# Patient Record
Sex: MALE | Race: WHITE | Employment: PART TIME | URBAN - METROPOLITAN AREA
[De-identification: names, ages, dates, MRNs, and addresses within clinical notes are randomized per-mention and may not be internally consistent; named-entity substitution may affect disease eponyms.]

---

## 2020-12-08 ENCOUNTER — HOSPITAL ENCOUNTER (INPATIENT)
Age: 35
LOS: 13 days | Discharge: OTHER INSTITUTION | DRG: 885 | End: 2020-12-21
Attending: EMERGENCY MEDICINE | Admitting: PSYCHIATRY & NEUROLOGY
Payer: COMMERCIAL

## 2020-12-08 PROBLEM — F23 ACUTE PSYCHOSIS (HCC): Status: ACTIVE | Noted: 2020-12-08

## 2020-12-08 LAB
ACETAMINOPHEN LEVEL: <5 MCG/ML (ref 10–30)
ALBUMIN SERPL-MCNC: 4.1 G/DL (ref 3.5–5.2)
ALP BLD-CCNC: 76 U/L (ref 40–129)
ALT SERPL-CCNC: 11 U/L (ref 0–40)
AMPHETAMINE SCREEN, URINE: NOT DETECTED
ANION GAP SERPL CALCULATED.3IONS-SCNC: 9 MMOL/L (ref 7–16)
AST SERPL-CCNC: 17 U/L (ref 0–39)
BARBITURATE SCREEN URINE: NOT DETECTED
BASOPHILS ABSOLUTE: 0.06 E9/L (ref 0–0.2)
BASOPHILS RELATIVE PERCENT: 0.9 % (ref 0–2)
BENZODIAZEPINE SCREEN, URINE: NOT DETECTED
BILIRUB SERPL-MCNC: 0.2 MG/DL (ref 0–1.2)
BILIRUBIN URINE: NEGATIVE
BLOOD, URINE: NEGATIVE
BUN BLDV-MCNC: 14 MG/DL (ref 6–20)
CALCIUM SERPL-MCNC: 9 MG/DL (ref 8.6–10.2)
CANNABINOID SCREEN URINE: NOT DETECTED
CHLORIDE BLD-SCNC: 102 MMOL/L (ref 98–107)
CLARITY: CLEAR
CO2: 29 MMOL/L (ref 22–29)
COCAINE METABOLITE SCREEN URINE: NOT DETECTED
COLOR: YELLOW
CREAT SERPL-MCNC: 1.1 MG/DL (ref 0.7–1.2)
EOSINOPHILS ABSOLUTE: 0.28 E9/L (ref 0.05–0.5)
EOSINOPHILS RELATIVE PERCENT: 4 % (ref 0–6)
ETHANOL: <10 MG/DL (ref 0–0.08)
FENTANYL SCREEN, URINE: NOT DETECTED
GFR AFRICAN AMERICAN: >60
GFR NON-AFRICAN AMERICAN: >60 ML/MIN/1.73
GLUCOSE BLD-MCNC: 140 MG/DL (ref 74–99)
GLUCOSE URINE: NEGATIVE MG/DL
HCT VFR BLD CALC: 41.8 % (ref 37–54)
HEMOGLOBIN: 14.3 G/DL (ref 12.5–16.5)
IMMATURE GRANULOCYTES #: 0.1 E9/L
IMMATURE GRANULOCYTES %: 1.4 % (ref 0–5)
KETONES, URINE: NEGATIVE MG/DL
LEUKOCYTE ESTERASE, URINE: NEGATIVE
LYMPHOCYTES ABSOLUTE: 1.59 E9/L (ref 1.5–4)
LYMPHOCYTES RELATIVE PERCENT: 22.8 % (ref 20–42)
Lab: NORMAL
MCH RBC QN AUTO: 31.8 PG (ref 26–35)
MCHC RBC AUTO-ENTMCNC: 34.2 % (ref 32–34.5)
MCV RBC AUTO: 92.9 FL (ref 80–99.9)
METHADONE SCREEN, URINE: NOT DETECTED
MONOCYTES ABSOLUTE: 0.62 E9/L (ref 0.1–0.95)
MONOCYTES RELATIVE PERCENT: 8.9 % (ref 2–12)
NEUTROPHILS ABSOLUTE: 4.32 E9/L (ref 1.8–7.3)
NEUTROPHILS RELATIVE PERCENT: 62 % (ref 43–80)
NITRITE, URINE: NEGATIVE
OPIATE SCREEN URINE: NOT DETECTED
OXYCODONE URINE: NOT DETECTED
PDW BLD-RTO: 12.2 FL (ref 11.5–15)
PH UA: 6.5 (ref 5–9)
PHENCYCLIDINE SCREEN URINE: NOT DETECTED
PLATELET # BLD: 193 E9/L (ref 130–450)
PMV BLD AUTO: 10 FL (ref 7–12)
POTASSIUM REFLEX MAGNESIUM: 4.2 MMOL/L (ref 3.5–5)
PROTEIN UA: NEGATIVE MG/DL
RBC # BLD: 4.5 E12/L (ref 3.8–5.8)
SALICYLATE, SERUM: <0.3 MG/DL (ref 0–30)
SARS-COV-2, NAAT: NOT DETECTED
SODIUM BLD-SCNC: 140 MMOL/L (ref 132–146)
SPECIFIC GRAVITY UA: 1.02 (ref 1–1.03)
TOTAL PROTEIN: 6.7 G/DL (ref 6.4–8.3)
TRICYCLIC ANTIDEPRESSANTS SCREEN SERUM: NEGATIVE NG/ML
UROBILINOGEN, URINE: 0.2 E.U./DL
VALPROIC ACID LEVEL: 47 MCG/ML (ref 50–100)
WBC # BLD: 7 E9/L (ref 4.5–11.5)

## 2020-12-08 PROCEDURE — 1240000000 HC EMOTIONAL WELLNESS R&B

## 2020-12-08 PROCEDURE — 6370000000 HC RX 637 (ALT 250 FOR IP): Performed by: PSYCHIATRY & NEUROLOGY

## 2020-12-08 PROCEDURE — 81003 URINALYSIS AUTO W/O SCOPE: CPT

## 2020-12-08 PROCEDURE — 93005 ELECTROCARDIOGRAM TRACING: CPT | Performed by: PHYSICIAN ASSISTANT

## 2020-12-08 PROCEDURE — U0002 COVID-19 LAB TEST NON-CDC: HCPCS

## 2020-12-08 PROCEDURE — 80307 DRUG TEST PRSMV CHEM ANLYZR: CPT

## 2020-12-08 PROCEDURE — 36415 COLL VENOUS BLD VENIPUNCTURE: CPT

## 2020-12-08 PROCEDURE — 80164 ASSAY DIPROPYLACETIC ACD TOT: CPT

## 2020-12-08 PROCEDURE — 85025 COMPLETE CBC W/AUTO DIFF WBC: CPT

## 2020-12-08 PROCEDURE — 99285 EMERGENCY DEPT VISIT HI MDM: CPT

## 2020-12-08 PROCEDURE — 80053 COMPREHEN METABOLIC PANEL: CPT

## 2020-12-08 PROCEDURE — G0480 DRUG TEST DEF 1-7 CLASSES: HCPCS

## 2020-12-08 RX ORDER — DIVALPROEX SODIUM 500 MG/1
1000 TABLET, DELAYED RELEASE ORAL NIGHTLY
Status: ON HOLD | COMMUNITY
End: 2020-12-21 | Stop reason: HOSPADM

## 2020-12-08 RX ORDER — HYDROXYZINE PAMOATE 50 MG/1
50 CAPSULE ORAL 3 TIMES DAILY PRN
Status: DISCONTINUED | OUTPATIENT
Start: 2020-12-08 | End: 2020-12-21 | Stop reason: HOSPADM

## 2020-12-08 RX ORDER — HYDROXYZINE HYDROCHLORIDE 10 MG/1
50 TABLET, FILM COATED ORAL 3 TIMES DAILY PRN
Status: DISCONTINUED | OUTPATIENT
Start: 2020-12-08 | End: 2020-12-08

## 2020-12-08 RX ORDER — TRAZODONE HYDROCHLORIDE 50 MG/1
50 TABLET ORAL NIGHTLY PRN
Status: DISCONTINUED | OUTPATIENT
Start: 2020-12-08 | End: 2020-12-21 | Stop reason: HOSPADM

## 2020-12-08 RX ORDER — HALOPERIDOL 5 MG/ML
5 INJECTION INTRAMUSCULAR EVERY 6 HOURS PRN
Status: DISCONTINUED | OUTPATIENT
Start: 2020-12-08 | End: 2020-12-21 | Stop reason: HOSPADM

## 2020-12-08 RX ORDER — ACETAMINOPHEN 325 MG/1
650 TABLET ORAL EVERY 6 HOURS PRN
Status: DISCONTINUED | OUTPATIENT
Start: 2020-12-08 | End: 2020-12-21 | Stop reason: HOSPADM

## 2020-12-08 RX ORDER — HALOPERIDOL 5 MG
5 TABLET ORAL EVERY 6 HOURS PRN
Status: DISCONTINUED | OUTPATIENT
Start: 2020-12-08 | End: 2020-12-21 | Stop reason: HOSPADM

## 2020-12-08 RX ORDER — ARIPIPRAZOLE 10 MG/1
10 TABLET ORAL NIGHTLY
Status: ON HOLD | COMMUNITY
End: 2020-12-21 | Stop reason: HOSPADM

## 2020-12-08 RX ORDER — MAGNESIUM HYDROXIDE/ALUMINUM HYDROXICE/SIMETHICONE 120; 1200; 1200 MG/30ML; MG/30ML; MG/30ML
30 SUSPENSION ORAL PRN
Status: DISCONTINUED | OUTPATIENT
Start: 2020-12-08 | End: 2020-12-15

## 2020-12-08 RX ADMIN — HYDROXYZINE PAMOATE 50 MG: 50 CAPSULE ORAL at 22:07

## 2020-12-08 RX ADMIN — HALOPERIDOL 5 MG: 5 TABLET ORAL at 22:07

## 2020-12-08 ASSESSMENT — SLEEP AND FATIGUE QUESTIONNAIRES
AVERAGE NUMBER OF SLEEP HOURS: 8
DO YOU USE A SLEEP AID: NO
DO YOU HAVE DIFFICULTY SLEEPING: NO

## 2020-12-08 ASSESSMENT — PATIENT HEALTH QUESTIONNAIRE - PHQ9: SUM OF ALL RESPONSES TO PHQ QUESTIONS 1-9: 13

## 2020-12-08 ASSESSMENT — LIFESTYLE VARIABLES: HISTORY_ALCOHOL_USE: YES
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
## 2020-12-08 NOTE — ED PROVIDER NOTES
ED Attending  CC: No     Department of Emergency Medicine   ED  Provider Note  Admit Date/RoomTime: 12/8/2020  1:57 PM  ED Room: [address] [place] [address]     Chief Complaint   Suicidal (suicidal with plan to use carbon monaxide from car. Paranoid, fears people are chasing him)    History of Present Illness      Sofía Arnett is a 38 y.o. old male who presents to the ED for suicidal ideation. Patient states he feels like people are after him and chasing him. He states he has felt suicidal for the past several months. He reports plan to use carbon monoxide from his car exhaust. He states he was recently hospitalized in [place] for 20 days for same reason. Patient denies homicidal ideation or visual hallucinations. He states he occasionally hears \"sounds\" but is unsure if they are just creaking from the house. He denies hearing voices. Patient has no chest pain, SOB, abdominal pain, nausea, vomiting, diarrhea, fever/chills, cough, congestion, rash, urinary complaints, or recent drug use but does admit to alcohol use. Patient is calm and cooperative. He is alert and oriented x3 and in no apparent distress at this exam.     Based on the behaviors expressed//manifested by this patient, he is an active danger to his self and or others and wound benefit from psychiatric evaluation/psychiartic hold at this time as he is unsafe and at significant risk to harm self or others. Pink slipped by ED physician     ROS   Pertinent positives and negatives are stated within HPI, all other systems reviewed and are negative. Past Medical History:  has no past medical history on file. Past Surgical History:  has no past surgical history on file. Social History:      Family History: family history is not on file. The patients home medications have been reviewed. Allergies: Patient has no known allergies. Allergies have been reviewed with patient.      Physical Exam   VS:  /74   Pulse 95   Temp 98.8 °F (37.1 °C) (Oral)   Resp 0.0 - 6.0 %    Basophils % 0.9 0.0 - 2.0 %    Neutrophils Absolute 4.32 1.80 - 7.30 E9/L    Immature Granulocytes # 0.10 E9/L    Lymphocytes Absolute 1.59 1.50 - 4.00 E9/L    Monocytes Absolute 0.62 0.10 - 0.95 E9/L    Eosinophils Absolute 0.28 0.05 - 0.50 E9/L    Basophils Absolute 0.06 0.00 - 0.20 E9/L   Comprehensive Metabolic Panel w/ Reflex to MG   Result Value Ref Range    Sodium 140 132 - 146 mmol/L    Potassium reflex Magnesium 4.2 3.5 - 5.0 mmol/L    Chloride 102 98 - 107 mmol/L    CO2 29 22 - 29 mmol/L    Anion Gap 9 7 - 16 mmol/L    Glucose 140 (H) 74 - 99 mg/dL    BUN 14 6 - 20 mg/dL    CREATININE 1.1 0.7 - 1.2 mg/dL    GFR Non-African American >60 >=60 mL/min/1.73    GFR African American >60     Calcium 9.0 8.6 - 10.2 mg/dL    Total Protein 6.7 6.4 - 8.3 g/dL    Alb 4.1 3.5 - 5.2 g/dL    Total Bilirubin 0.2 0.0 - 1.2 mg/dL    Alkaline Phosphatase 76 40 - 129 U/L    ALT 11 0 - 40 U/L    AST 17 0 - 39 U/L   Urinalysis   Result Value Ref Range    Color, UA Yellow Straw/Yellow    Clarity, UA Clear Clear    Glucose, Ur Negative Negative mg/dL    Bilirubin Urine Negative Negative    Ketones, Urine Negative Negative mg/dL    Specific Gravity, UA 1.020 1.005 - 1.030    Blood, Urine Negative Negative    pH, UA 6.5 5.0 - 9.0    Protein, UA Negative Negative mg/dL    Urobilinogen, Urine 0.2 <2.0 E.U./dL    Nitrite, Urine Negative Negative    Leukocyte Esterase, Urine Negative Negative   URINE DRUG SCREEN   Result Value Ref Range    Amphetamine Screen, Urine NOT DETECTED Negative <1000 ng/mL    Barbiturate Screen, Ur NOT DETECTED Negative < 200 ng/mL    Benzodiazepine Screen, Urine NOT DETECTED Negative < 200 ng/mL    Cannabinoid Scrn, Ur NOT DETECTED Negative < 50ng/mL    Cocaine Metabolite Screen, Urine NOT DETECTED Negative < 300 ng/mL    Opiate Scrn, Ur NOT DETECTED Negative < 300ng/mL    PCP Screen, Urine NOT DETECTED Negative < 25 ng/mL    Methadone Screen, Urine NOT DETECTED Negative <300 ng/mL    Oxycodone Urine NOT DETECTED Negative <100 ng/mL    FENTANYL SCREEN, URINE NOT DETECTED Negative <1 ng/mL    Drug Screen Comment: see below    Serum Drug Screen   Result Value Ref Range    Ethanol Lvl <10 mg/dL    Acetaminophen Level <5.0 (L) 10.0 - 61.0 mcg/mL    Salicylate, Serum <5.7 0.0 - 30.0 mg/dL    TCA Scrn NEGATIVE Cutoff:300 ng/mL   COVID-19   Result Value Ref Range    SARS-CoV-2, NAAT Not Detected Not Detected     Imaging: All Radiology results interpreted by Radiologist unless otherwise noted. No orders to display     EKG #1:  Interpreted by emergency department physician unless otherwise noted. Time:  1511    Rate: 82  QT/QTc: 362/422  Rhythm: Sinus. Interpretation: no acute changes. Comparison: no previous EKG. ED Course / Medical Decision Making   Medications - No data to display    Re-examination:   Patient continues to rest comfortably in bed with no distress. He remains clam and cooperative      Consult(s):  IP CONSULT TO SOCIAL WORK    Procedure(s):  None    MDM:       Counseling: The emergency provider has spoken with the patient/caregiver and/or gaurdian and discussed todays results, in addition to providing specific details for the plan of care and counseling regarding the diagnosis and prognosis. Questions are answered at this time and they are agreeable with the plan. Case discussed with Dr. Maribell Hurtado who saw patient with me     Assessment      1. Suicidal ideation      Plan   Admit to mental health unit - medically cleared for admission  Patient condition is good    New Medications     New Prescriptions    No medications on file       Electronically signed by Ry Ramsay PA-C   DD: 12/8/20  **This report was transcribed using voice recognition software. Every effort was made to ensure accuracy; however, inadvertent computerized transcription errors may be present.     END OF ED PROVIDER NOTE       Ry Ramsay PA-C  12/08/20 6857

## 2020-12-09 LAB
CHOLESTEROL, TOTAL: 183 MG/DL (ref 0–199)
EKG ATRIAL RATE: 82 BPM
EKG P AXIS: 73 DEGREES
EKG P-R INTERVAL: 176 MS
EKG Q-T INTERVAL: 362 MS
EKG QRS DURATION: 88 MS
EKG QTC CALCULATION (BAZETT): 422 MS
EKG R AXIS: 81 DEGREES
EKG T AXIS: 61 DEGREES
EKG VENTRICULAR RATE: 82 BPM
HBA1C MFR BLD: 5.5 % (ref 4–5.6)
HDLC SERPL-MCNC: 71 MG/DL
LDL CHOLESTEROL CALCULATED: 98 MG/DL (ref 0–99)
TRIGL SERPL-MCNC: 69 MG/DL (ref 0–149)
VLDLC SERPL CALC-MCNC: 14 MG/DL

## 2020-12-09 PROCEDURE — 80061 LIPID PANEL: CPT

## 2020-12-09 PROCEDURE — 36415 COLL VENOUS BLD VENIPUNCTURE: CPT

## 2020-12-09 PROCEDURE — 83036 HEMOGLOBIN GLYCOSYLATED A1C: CPT

## 2020-12-09 PROCEDURE — 99223 1ST HOSP IP/OBS HIGH 75: CPT | Performed by: NURSE PRACTITIONER

## 2020-12-09 PROCEDURE — 93010 ELECTROCARDIOGRAM REPORT: CPT | Performed by: INTERNAL MEDICINE

## 2020-12-09 PROCEDURE — 1240000000 HC EMOTIONAL WELLNESS R&B

## 2020-12-09 ASSESSMENT — LIFESTYLE VARIABLES: HISTORY_ALCOHOL_USE: YES

## 2020-12-09 ASSESSMENT — SLEEP AND FATIGUE QUESTIONNAIRES
DO YOU USE A SLEEP AID: NO
AVERAGE NUMBER OF SLEEP HOURS: 8
DO YOU HAVE DIFFICULTY SLEEPING: NO

## 2020-12-09 ASSESSMENT — PATIENT HEALTH QUESTIONNAIRE - PHQ9: SUM OF ALL RESPONSES TO PHQ QUESTIONS 1-9: 13

## 2020-12-09 ASSESSMENT — PAIN SCALES - GENERAL: PAINLEVEL_OUTOF10: 0
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
## 2020-12-09 NOTE — GROUP NOTE
Group Therapy Note    Date: 12/9/2020    Group Start Time: 1115  Group End Time: 3221  Group Topic: Cognitive Skills    [facility] [address], MSW, LSW        Group Therapy Note    Attendees: 13       Patient's Goal: To identify daily hassle and life event stressors. Notes: Pt was an active listener in group. Status After Intervention:  Unchanged    Participation Level:  Active Listener and Interactive    Participation Quality: Appropriate and Attentive      Speech:  normal      Thought Process/Content: Linear      Affective Functioning: Congruent      Mood: depressed      Level of consciousness:  Alert and Oriented x4      Response to Learning: Able to verbalize current knowledge/experience, Able to verbalize/acknowledge new learning and Able to retain information      Endings: None Reported    Modes of Intervention: Support, Socialization and Exploration      Discipline Responsible: /Counselor      Signature:  JESSIE Cantu, [place]

## 2020-12-09 NOTE — ED NOTES
Pt is indigent in [facility] is not accepting pt's at this time    Pt has been accepted to 7s by Dr. Gil Varela to [facility] in admitting          Amy Taylor, [employer]  12/08/20 1932

## 2020-12-09 NOTE — GROUP NOTE
Recreation assessment completed. Attended community meeting shared goal for the day as to go with the flow. Date: 12/9/2020    Group Start Time: 1010  Group End Time: 1055  Group Topic: Psychoeducation    [facility]    [place]                                                                        Group Therapy Note    Date: 12/9/2020  Type of Group: Psychoeducation    Wellness Binder Information  Module Name:  physical effects to stress   Patient's Goal: patient will be able to id physical effects to stress and what he/she experiences. Notes: pleasant and engaged in group. Will to share when prompted. Status After Intervention:  Improved  Participation Level:  Active Listener and Interactive  Participation Quality: Appropriate, Attentive, Sharing, and Supportive  Speech:  normal   Thought Process/Content: Logical  Affective Functioning: Congruent  Mood: euthymic  Level of consciousness:  Alert, Oriented x4, and Attentive  Response to Learning: Able to verbalize/acknowledge new learning, Able to retain information, and Progressing to goal  Endings: None Reported  Modes of Intervention: Education, Support, Socialization, Exploration, and Problem-solving  Discipline Responsible: Psychoeducational Specialist  Signature:  Lashonda Hernandez
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
## 2020-12-09 NOTE — PLAN OF CARE
Problem: Depressive Behavior With or Without Suicide Precautions:  Goal: Able to verbalize acceptance of life and situations over which he or she has no control  Description: Able to verbalize acceptance of life and situations over which he or she has no control  Outcome: Not Met This Shift     Problem: Depressive Behavior With or Without Suicide Precautions:  Goal: Able to verbalize and/or display a decrease in depressive symptoms  Description: Able to verbalize and/or display a decrease in depressive symptoms  Outcome: Not Met This Shift     Problem: Depressive Behavior With or Without Suicide Precautions:  Goal: Ability to disclose and discuss suicidal ideas will improve  Description: Ability to disclose and discuss suicidal ideas will improve  Outcome: Not Met This Shift     Problem: Depressive Behavior With or Without Suicide Precautions:  Goal: Able to verbalize support systems  Description: Able to verbalize support systems  Outcome: Ongoing     Problem: Depressive Behavior With or Without Suicide Precautions:  Goal: Absence of self-harm  Description: Absence of self-harm  Outcome: Ongoing     Problem: Depressive Behavior With or Without Suicide Precautions:  Goal: Patient specific goal  Description: Patient specific goal  Outcome: Not Met This Shift     Problem: Depressive Behavior With or Without Suicide Precautions:  Goal: Participates in care planning  Description: Participates in care planning  Outcome: Met This Shift     Problem: Altered Mood, Psychotic Behavior:  Goal: Able to demonstrate trust by eating, participating in treatment and following staff's direction  Description: Able to demonstrate trust by eating, participating in treatment and following staff's direction  Outcome: Ongoing     Problem: Altered Mood, Psychotic Behavior:  Goal: Able to verbalize decrease in frequency and intensity of hallucinations  Description: Able to verbalize decrease in frequency and intensity of hallucinations  Outcome: Not Met This Shift     Problem: Altered Mood, Psychotic Behavior:  Goal: Able to verbalize reality based thinking  Description: Able to verbalize reality based thinking  Outcome: Not Met This Shift     Problem: Altered Mood, Psychotic Behavior:  Goal: Absence of self-harm  Description: Absence of self-harm  Outcome: Ongoing     Problem: Altered Mood, Psychotic Behavior:  Goal: Ability to achieve adequate nutritional intake will improve  Description: Ability to achieve adequate nutritional intake will improve  Outcome: Met This Shift     Problem: Altered Mood, Psychotic Behavior:  Goal: Ability to interact with others will improve  Description: Ability to interact with others will improve  Outcome: Ongoing     Problem: Altered Mood, Psychotic Behavior:  Goal: Compliance with prescribed medication regimen will improve  Description: Compliance with prescribed medication regimen will improve  Outcome: Ongoing     Problem: Altered Mood, Psychotic Behavior:  Goal: Patient specific goal  Description: Patient specific goal  Outcome: Not Met This Shift     Problem: Substance Abuse:  Goal: Absence of drug withdrawal signs and symptoms  Description: Absence of drug withdrawal signs and symptoms  Outcome: Ongoing     Problem: Substance Abuse:  Goal: Participates in care planning  Description: Participates in care planning  Outcome: Ongoing     Problem: Substance Abuse:  Goal: Patient specific goal  Description: Patient specific goal  Outcome: Ongoing
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
## 2020-12-10 PROBLEM — F20.0 SCHIZOPHRENIA, PARANOID (HCC): Status: ACTIVE | Noted: 2020-12-10

## 2020-12-10 PROCEDURE — 6370000000 HC RX 637 (ALT 250 FOR IP): Performed by: NURSE PRACTITIONER

## 2020-12-10 PROCEDURE — 1240000000 HC EMOTIONAL WELLNESS R&B

## 2020-12-10 PROCEDURE — 6370000000 HC RX 637 (ALT 250 FOR IP): Performed by: PSYCHIATRY & NEUROLOGY

## 2020-12-10 PROCEDURE — 99232 SBSQ HOSP IP/OBS MODERATE 35: CPT | Performed by: NURSE PRACTITIONER

## 2020-12-10 RX ORDER — RISPERIDONE 1 MG/1
1 TABLET, FILM COATED ORAL 2 TIMES DAILY
Status: DISCONTINUED | OUTPATIENT
Start: 2020-12-10 | End: 2020-12-11

## 2020-12-10 RX ADMIN — RISPERIDONE 1 MG: 1 TABLET, FILM COATED ORAL at 20:34

## 2020-12-10 RX ADMIN — TRAZODONE HYDROCHLORIDE 50 MG: 50 TABLET ORAL at 20:34

## 2020-12-10 RX ADMIN — RISPERIDONE 1 MG: 1 TABLET, FILM COATED ORAL at 13:38

## 2020-12-10 ASSESSMENT — PAIN SCALES - GENERAL
PAINLEVEL_OUTOF10: 0
PAINLEVEL_OUTOF10: 0

## 2020-12-10 NOTE — PLAN OF CARE
Problem: Depressive Behavior With or Without Suicide Precautions:  Goal: Able to verbalize acceptance of life and situations over which he or she has no control  Description: Able to verbalize acceptance of life and situations over which he or she has no control  Outcome: Ongoing     Problem: Depressive Behavior With or Without Suicide Precautions:  Goal: Able to verbalize and/or display a decrease in depressive symptoms  Description: Able to verbalize and/or display a decrease in depressive symptoms  12/10/2020 0013 by Tiffanie Valentine RN  Outcome: Ongoing  12/9/2020 1033 by Reymundo Vargas RN  Outcome: Ongoing     Problem: Depressive Behavior With or Without Suicide Precautions:  Goal: Ability to disclose and discuss suicidal ideas will improve  Description: Ability to disclose and discuss suicidal ideas will improve  Outcome: Ongoing     Pt denies suicidal ideations, homicidal ideations and hallucinations. Pt out on the unit for meals and phone calls. Pt presents flat and sad. Avoids gaze. Suspicious. Will continue to monitor.

## 2020-12-10 NOTE — PLAN OF CARE
Pt. Denies SI, HI, and hallucinations this shift. Pt. Denies physical complaints currently. Pt. Is isolative, evasive, and blunted.

## 2020-12-10 NOTE — PROGRESS NOTES
Examination:    Level of consciousness:  within normal limits   Appearance:  fair grooming and fair hygiene  Behavior/Motor:  no abnormalities noted  Attitude toward examiner:  cooperative  Speech:  spontaneous, normal rate and normal volume   Mood: \" My mood is good. \"  Affect: Appropriate and pleasant  Thought processes: Linear without flight of ideas loose associations  Thought content: Devoid of any auditory visualizations delusions or other perceptual normalities. Denies SI/HI intent or plan  Cognition:  oriented to person, place, and time   Concentration fair  Insight Limited  Judgement Limited    ASSESSMENT:   Patient symptoms are:  [] Well controlled  [] Improving  [] Worsening  [x] No change      Diagnosis:   Principal Problem:    Schizophrenia, paranoid ([facility]  Active Problems:    Acute psychosis ([facility]  Resolved Problems:    * No resolved hospital problems. *      LABS:    Recent Labs     12/08/20  1424   WBC 7.0   HGB 14.3        Recent Labs     12/08/20  1424      K 4.2      CO2 29   BUN 14   CREATININE 1.1   GLUCOSE 140*     Recent Labs     12/08/20  1424   BILITOT 0.2   ALKPHOS 76   AST 17   ALT 11     Lab Results   Component Value Date    LABAMPH NOT DETECTED 12/08/2020    BARBSCNU NOT DETECTED 12/08/2020    LABBENZ NOT DETECTED 12/08/2020    LABMETH NOT DETECTED 12/08/2020    OPIATESCREENURINE NOT DETECTED 12/08/2020    PHENCYCLIDINESCREENURINE NOT DETECTED 12/08/2020    ETOH <10 12/08/2020     No results found for: TSH, FREET4  No results found for: LITHIUM  Lab Results   Component Value Date    VALPROATE 47 (L) 12/08/2020         Treatment Plan:  Reviewed current Medications with the patient. Risks, benefits, side effects, drug-to-drug interactions and alternatives to treatment were discussed. Collateral information:   CD evaluation  Encourage patient to attend group and other milieu activities.   Discharge planning discussed with the patient and treatment team.      Continue

## 2020-12-11 PROCEDURE — 99232 SBSQ HOSP IP/OBS MODERATE 35: CPT | Performed by: NURSE PRACTITIONER

## 2020-12-11 PROCEDURE — 6370000000 HC RX 637 (ALT 250 FOR IP): Performed by: NURSE PRACTITIONER

## 2020-12-11 PROCEDURE — 1240000000 HC EMOTIONAL WELLNESS R&B

## 2020-12-11 RX ORDER — RISPERIDONE 2 MG/1
2 TABLET, FILM COATED ORAL NIGHTLY
Status: DISCONTINUED | OUTPATIENT
Start: 2020-12-11 | End: 2020-12-13

## 2020-12-11 RX ORDER — RISPERIDONE 1 MG/1
1 TABLET, FILM COATED ORAL DAILY
Status: DISCONTINUED | OUTPATIENT
Start: 2020-12-12 | End: 2020-12-13

## 2020-12-11 RX ORDER — LANOLIN ALCOHOL/MO/W.PET/CERES
3 CREAM (GRAM) TOPICAL NIGHTLY
Status: DISCONTINUED | OUTPATIENT
Start: 2020-12-11 | End: 2020-12-21 | Stop reason: HOSPADM

## 2020-12-11 RX ADMIN — RISPERIDONE 1 MG: 1 TABLET, FILM COATED ORAL at 08:17

## 2020-12-11 RX ADMIN — RISPERIDONE 2 MG: 2 TABLET, FILM COATED ORAL at 21:23

## 2020-12-11 RX ADMIN — Medication 3 MG: at 21:23

## 2020-12-11 ASSESSMENT — PAIN SCALES - GENERAL
PAINLEVEL_OUTOF10: 0
PAINLEVEL_OUTOF10: 0
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
## 2020-12-11 NOTE — PLAN OF CARE
Problem: Depressive Behavior With or Without Suicide Precautions:  Goal: Able to verbalize and/or display a decrease in depressive symptoms  Description: Able to verbalize and/or display a decrease in depressive symptoms  12/11/2020 1028 by Jose Alejandro Charles RN  Outcome: Ongoing  12/10/2020 2119 by Jana Jones RN  Outcome: Ongoing  Goal: Ability to disclose and discuss suicidal ideas will improve  Description: Ability to disclose and discuss suicidal ideas will improve  12/11/2020 1028 by Jose Alejandro Charles RN  Outcome: Ongoing  12/10/2020 2119 by Jana Jones RN  Outcome: Ongoing  Pt c/o fleeting suicidal ideation without plan. Pt denies HI and c/o auditory hallucinations of \"noises\". Pt is paranoid and watchful pt states he knows the people who are out to get him are still out there. Pt has a flat affect and isolates to his room most of the day. Pt takes med's but doesn't attend groups. Pt encouraged to attend groups and participate.

## 2020-12-11 NOTE — PROGRESS NOTES
[facility] FOLLOW-UP NOTE     12/11/2020     Patient was seen and examined in person, Chart reviewed   Patient's case discussed with staff/team    Chief Complaint: \"I think people are still out there\"    Interim History: Patient seen during treatment team.  He states that he is still hearing sounds. He reports trouble sleeping he has not been attending groups of socializing with peers he appears paranoid and states that he still believes there are people out there who wanted get him. When asked to these people are he is not able to say who they are. He endorses suicidal ideations without a plan and denies homicidal ideations intent or plan        Appetite:   [x] Normal/Unchanged  [] Increased  [] Decreased      Sleep:       [x] Normal/Unchanged  [] Fair       [] Poor              Energy:    [x] Normal/Unchanged  [] Increased  [] Decreased        SI [x] Present  [] Absent    HI  []Present  [x] Absent     Aggression:  [] yes  [x] no    Patient is [x] able  [] unable to CONTRACT FOR SAFETY     PAST MEDICAL/PSYCHIATRIC HISTORY:   Past Medical History:   Diagnosis Date    Colitis     1996       FAMILY/SOCIAL HISTORY:  Family History   Problem Relation Age of Onset    Mental Illness Mother      Social History     Socioeconomic History    Marital status: Single     Spouse name: Not on file    Number of children: 0    Years of education: 8th grade     Highest education level: Not on file   Occupational History    Not on file   Social Needs    Financial resource strain: Not on file    Food insecurity     Worry: Not on file     Inability: Not on file   [employer] needs     Medical: Not on file     Non-medical: Not on file   Tobacco Use    Smoking status: Current Every Day Smoker     Packs/day: 0.25     Types: Cigarettes     Start date: 1/8/2000    Smokeless tobacco: Never Used   Substance and Sexual Activity    Alcohol use:  Yes     Alcohol/week: 28.0 standard drinks     Types: 28 Cans of beer per week     Comment: last few months     Drug use: Never    Sexual activity: Not Currently   Lifestyle    Physical activity     Days per week: Not on file     Minutes per session: Not on file    Stress: Not on file   Relationships    Social connections     Talks on phone: Not on file     Gets together: Not on file     Attends Mormon service: Not on file     Active member of club or organization: Not on file     Attends meetings of clubs or organizations: Not on file     Relationship status: Not on file    Intimate partner violence     Fear of current or ex partner: Not on file     Emotionally abused: Not on file     Physically abused: Not on file     Forced sexual activity: Not on file   Other Topics Concern    Not on file   Social History Narrative    Not on file           ROS:  [x] All negative/unchanged except if checked.  Explain positive(checked items) below:  [] Constitutional  [] Eyes  [] Ear/Nose/Mouth/Throat  [] Respiratory  [] CV  [] GI  []   [] Musculoskeletal  [] Skin/Breast  [] Neurological  [] Endocrine  [] Heme/Lymph  [] Allergic/Immunologic    Explanation:     MEDICATIONS:    Current Facility-Administered Medications:     risperiDONE (RISPERDAL) tablet 1 mg, 1 mg, Oral, BID, Chau Reza, SEGUN - CNP, 1 mg at 12/11/20 0817    acetaminophen (TYLENOL) tablet 650 mg, 650 mg, Oral, Q6H PRN, Leopold Garner, MD    haloperidol lactate (HALDOL) injection 5 mg, 5 mg, Intramuscular, Q6H PRN **OR** haloperidol (HALDOL) tablet 5 mg, 5 mg, Oral, Q6H PRN, Leopold Garner, MD, 5 mg at 12/08/20 2207    traZODone (DESYREL) tablet 50 mg, 50 mg, Oral, Nightly PRN, Leopold Garner, MD, 50 mg at 12/10/20 2034    magnesium hydroxide (MILK OF MAGNESIA) 400 MG/5ML suspension 30 mL, 30 mL, Oral, Daily PRN, Leopold Garner, MD    aluminum & magnesium hydroxide-simethicone (MAALOX) 200-200-20 MG/5ML suspension 30 mL, 30 mL, Oral, PRN, Leopold Garner, MD    hydrOXYzine (VISTARIL) capsule 50 mg, 50 mg, Oral, TID PRN, Valerie Bear MD, 50 mg at 12/08/20 2207      Examination:  /63   Pulse 60   Temp 98 °F (36.7 °C) (Temporal)   Resp 14   Ht 5' 6\" (1.676 m)   Wt 150 lb (68 kg)   SpO2 97%   BMI 24.21 kg/m²   Gait - steady  Medication side effects(SE): Denies    Mental Status Examination:    Level of consciousness:  within normal limits   Appearance:  fair grooming and fair hygiene  Behavior/Motor:  no abnormalities noted  Attitude toward examiner:  cooperative  Speech:  spontaneous, normal rate and normal volume   Mood: \" My mood okay. \"  Affect: Flat and blunted  Thought processes: Linear without flight of ideas loose associations  Thought content: Endorses hearing sounds endorses paranoia. Endorses suicidal ideations with a plan denies homicidal ideations intent or plan   cognition:  oriented to person, place, and time   Concentration fair  Insight Limited  Judgement Limited    ASSESSMENT:   Patient symptoms are:  [] Well controlled  [] Improving  [] Worsening  [x] No change      Diagnosis:   Principal Problem:    Schizophrenia, paranoid ([facility] [address]  Active Problems:    [facility]  Resolved Problems:    * No resolved hospital problems.  *      LABS:    Recent Labs     12/08/20  1424   WBC 7.0   HGB 14.3        Recent Labs     12/08/20  1424      K 4.2      CO2 29   BUN 14   CREATININE 1.1   GLUCOSE 140*     Recent Labs     12/08/20  1424   BILITOT 0.2   ALKPHOS 76   AST 17   ALT 11     Lab Results   Component Value Date    LABAMPH NOT DETECTED 12/08/2020    BARBSCNU NOT DETECTED 12/08/2020    LABBENZ NOT DETECTED 12/08/2020    LABMETH NOT DETECTED 12/08/2020    OPIATESCREENURINE NOT DETECTED 12/08/2020    PHENCYCLIDINESCREENURINE NOT DETECTED 12/08/2020    ETOH <10 12/08/2020     No results found for: TSH, FREET4  No results found for: LITHIUM  Lab Results   Component Value Date    VALPROATE 47 (L) 12/08/2020         Treatment Plan:  Reviewed current Medications with the patient. Risks, benefits, side effects, drug-to-drug interactions and alternatives to treatment were discussed. Collateral information:   CD evaluation  Encourage patient to attend group and other milieu activities.   Discharge planning discussed with the patient and treatment team.      Increase Risperdal 1 mg daily and 2 mg at bedtime  Staff confirmed patient received Abilify maintaina 400 mg IM on 11/29/2020          PSYCHOTHERAPY/COUNSELING:  [x] Therapeutic interview  [x] Supportive  [] CBT  [] Ongoing  [] Other    [x] Patient continues to need, on a daily basis, active treatment furnished directly by or requiring the supervision of inpatient psychiatric personnel      Anticipated Length of stay:            Electronically signed by SEGUN Olmos CNP on 46/66/6444 at 9:02 AM

## 2020-12-11 NOTE — GROUP NOTE
Group Therapy Note    Date: 12/11/2020    Group Start Time: 7659  Group End Time: 3440  Group Topic: Cognitive Skills    [facility]    LATA Bowman        Group Therapy Note    Attendees: 4         Patient's Goal: pt will be able to identify things that increase and decrease self esteem. Notes: Pt participated actively in self esteem activity and made positive connections with others. Status After Intervention:  Improved    Participation Level:  Active Listener and Interactive    Participation Quality: Appropriate, Attentive, Sharing and Supportive      Speech:  normal      Thought Process/Content: Logical      Affective Functioning: Blunted      Mood: depressed      Level of consciousness:  Alert, Oriented x4 and Attentive      Response to Learning: Able to verbalize current knowledge/experience, Able to verbalize/acknowledge new learning and Progressing to goal      Endings: None Reported    Modes of Intervention: Education, Support, Socialization and Activity      Discipline Responsible: /Counselor      Signature:  Valrie Rinne, LISW-S

## 2020-12-12 PROCEDURE — 6370000000 HC RX 637 (ALT 250 FOR IP): Performed by: PSYCHIATRY & NEUROLOGY

## 2020-12-12 PROCEDURE — 1240000000 HC EMOTIONAL WELLNESS R&B

## 2020-12-12 PROCEDURE — 99231 SBSQ HOSP IP/OBS SF/LOW 25: CPT | Performed by: NURSE PRACTITIONER

## 2020-12-12 PROCEDURE — 6370000000 HC RX 637 (ALT 250 FOR IP): Performed by: NURSE PRACTITIONER

## 2020-12-12 RX ADMIN — MAGNESIUM HYDROXIDE 30 ML: 2400 SUSPENSION ORAL at 12:51

## 2020-12-12 RX ADMIN — RISPERIDONE 1 MG: 1 TABLET, FILM COATED ORAL at 09:33

## 2020-12-12 RX ADMIN — RISPERIDONE 2 MG: 2 TABLET, FILM COATED ORAL at 20:53

## 2020-12-12 ASSESSMENT — PAIN SCALES - GENERAL: PAINLEVEL_OUTOF10: 0

## 2020-12-12 NOTE — PROGRESS NOTES
Patient is isolative to his room. States that he still has fleeting suicide, no plan, contracts for safety. Still hearing sounds (My bed makes sounds which make me jump)   Denies HI or visual hallucinations. Depression 5 out of 10 due to back in hospital was just in hospital in [place] 2 weeks ago for same thing as now. Verbalizes anxiety is 4 out of 10 for no reason at all. Appetite is normal  Verbalizes struggles to get to sleep. Verbalizes that has for someone out to get him currently no but won't know until discharged. Verbalizes that he has not noticed any difference since taking medications. Patient observed as flat,sad. Patient compliant with medications and does not attend groups. Safety rounds continue.

## 2020-12-12 NOTE — PROGRESS NOTES
BEHAVIORAL HEALTH FOLLOW-UP NOTE     12/12/2020     Patient was seen and examined in person, Chart reviewed   Patient's case discussed with staff/team    Chief Complaint: \"I still feel paranoid\"    Interim History: She is seen in his room he continues to isolate does not attend groups or socialize with peers. He endorses paranoia. He denies SI/HI intent or plan. He states that the sounds he been hearing are slowing down. Staff reports he was having fleeting suicidal ideations. He is very guarded isolative evasive. Appetite:   [x] Normal/Unchanged  [] Increased  [] Decreased      Sleep:       [x] Normal/Unchanged  [] Fair       [] Poor              Energy:    [x] Normal/Unchanged  [] Increased  [] Decreased        SI [] Present  [x] Absent    HI  []Present  [x] Absent     Aggression:  [] yes  [x] no    Patient is [x] able  [] unable to CONTRACT FOR SAFETY     PAST MEDICAL/PSYCHIATRIC HISTORY:   Past Medical History:   Diagnosis Date    Colitis     1996       FAMILY/SOCIAL HISTORY:  Family History   Problem Relation Age of Onset    Mental Illness Mother      Social History     Socioeconomic History    Marital status: Single     Spouse name: Not on file    Number of children: 0    Years of education: 8th grade     Highest education level: Not on file   Occupational History    Not on file   Social Needs    Financial resource strain: Not on file    Food insecurity     Worry: Not on file     Inability: Not on file    Transportation needs     Medical: Not on file     Non-medical: Not on file   Tobacco Use    Smoking status: Current Every Day Smoker     Packs/day: 0.25     Types: Cigarettes     Start date: 1/8/2000    Smokeless tobacco: Never Used   Substance and Sexual Activity    Alcohol use:  Yes     Alcohol/week: 28.0 standard drinks     Types: 28 Cans of beer per week     Comment: last few months     Drug use: Never    Sexual activity: Not Currently   Lifestyle    Physical activity     Days per week: Not on file     Minutes per session: Not on file    Stress: Not on file   Relationships    Social connections     Talks on phone: Not on file     Gets together: Not on file     Attends Judaism service: Not on file     Active member of club or organization: Not on file     Attends meetings of clubs or organizations: Not on file     Relationship status: Not on file    Intimate partner violence     Fear of current or ex partner: Not on file     Emotionally abused: Not on file     Physically abused: Not on file     Forced sexual activity: Not on file   Other Topics Concern    Not on file   Social History Narrative    Not on file           ROS:  [x] All negative/unchanged except if checked.  Explain positive(checked items) below:  [] Constitutional  [] Eyes  [] Ear/Nose/Mouth/Throat  [] Respiratory  [] CV  [] GI  []   [] Musculoskeletal  [] Skin/Breast  [] Neurological  [] Endocrine  [] Heme/Lymph  [] Allergic/Immunologic    Explanation:     MEDICATIONS:    Current Facility-Administered Medications:     risperiDONE (RISPERDAL) tablet 1 mg, 1 mg, Oral, Daily, [employer] Dellick, APRN - CNP, 1 mg at 12/12/20 0933    risperiDONE (RISPERDAL) tablet 2 mg, 2 mg, Oral, Nightly, [employer] Dellick, APRN - CNP, 2 mg at 12/11/20 2123    melatonin tablet 3 mg, 3 mg, Oral, Nightly, Neldon Fruit, APRN - CNP, 3 mg at 12/11/20 2123    acetaminophen (TYLENOL) tablet 650 mg, 650 mg, Oral, Q6H PRN, Sal mEmanuel MD    haloperidol lactate (HALDOL) injection 5 mg, 5 mg, Intramuscular, Q6H PRN **OR** haloperidol (HALDOL) tablet 5 mg, 5 mg, Oral, Q6H PRN, Sal Emmanuel MD, 5 mg at 12/08/20 2207    traZODone (DESYREL) tablet 50 mg, 50 mg, Oral, Nightly PRN, Sal Emmanuel MD, 50 mg at 12/10/20 2034    magnesium hydroxide (MILK OF MAGNESIA) 400 MG/5ML suspension 30 mL, 30 mL, Oral, Daily PRN, Sal Emmanuel MD    aluminum & magnesium hydroxide-simethicone (MAALOX) 200-200-20 MG/5ML suspension 30 mL, 30 mL, Oral, PRNValerie MD    hydrOXYzine (VISTARIL) capsule 50 mg, 50 mg, Oral, TID PRN, Valerie Bear MD, 50 mg at 12/08/20 2207      Examination:  BP (!) 103/51   Pulse 59   Temp 97.9 °F (36.6 °C) (Oral)   Resp 26   Ht 5' 6\" (1.676 m)   Wt 150 lb (68 kg)   SpO2 97%   BMI 24.21 kg/m²   Gait - steady  Medication side effects(SE): Denies    Mental Status Examination:    Level of consciousness:  within normal limits   Appearance:  fair grooming and fair hygiene  Behavior/Motor:  no abnormalities noted  Attitude toward examiner:  cooperative  Speech:  spontaneous, normal rate and normal volume   Mood: \" My mood okay. \"  Affect: Flat and blunted  Thought processes: Linear without flight of ideas loose associations  Thought content: Endorses paranoia and states his auditory hallucinations are slowing down denies SI/HI intent or plan   cognition:  oriented to person, place, and time   Concentration fair  Insight Limited  Judgement Limited    ASSESSMENT:   Patient symptoms are:  [] Well controlled  [x] Improving  [] Worsening  [] No change      Diagnosis:   Principal Problem:    Schizophrenia, paranoid ([facility] [address]  Active Problems:    Acute psychosis ([facility] [address]  Resolved Problems:    * No resolved hospital problems. *      LABS:    No results for input(s): WBC, HGB, PLT in the last 72 hours. No results for input(s): NA, K, CL, CO2, BUN, CREATININE, GLUCOSE in the last 72 hours. No results for input(s): BILITOT, ALKPHOS, AST, ALT in the last 72 hours.   Lab Results   Component Value Date    LABAMPH NOT DETECTED 12/08/2020    BARBSCNU NOT DETECTED 12/08/2020    LABBENZ NOT DETECTED 12/08/2020    LABMETH NOT DETECTED 12/08/2020    OPIATESCREENURINE NOT DETECTED 12/08/2020    PHENCYCLIDINESCREENURINE NOT DETECTED 12/08/2020    ETOH <10 12/08/2020     No results found for: TSH, FREET4  No results found for: LITHIUM  Lab Results   Component Value Date    VALPROATE 47 (L) 12/08/2020         Treatment Plan:  Reviewed current Medications with the patient. Risks, benefits, side effects, drug-to-drug interactions and alternatives to treatment were discussed. Collateral information:   CD evaluation  Encourage patient to attend group and other milieu activities.   Discharge planning discussed with the patient and treatment team.      Continue Risperdal 1 mg daily and 2 mg at bedtime  Staff confirmed patient received Abilify maintaina 400 mg IM on 11/29/2020          PSYCHOTHERAPY/COUNSELING:  [x] Therapeutic interview  [x] Supportive  [] CBT  [] Ongoing  [] Other    [x] Patient continues to need, on a daily basis, active treatment furnished directly by or requiring the supervision of inpatient psychiatric personnel      Anticipated Length of stay:            Electronically signed by SEGUN Nuno CNP on 64/91/5476 at 11:15 AM

## 2020-12-13 PROCEDURE — 1240000000 HC EMOTIONAL WELLNESS R&B

## 2020-12-13 PROCEDURE — 99231 SBSQ HOSP IP/OBS SF/LOW 25: CPT | Performed by: NURSE PRACTITIONER

## 2020-12-13 PROCEDURE — 6370000000 HC RX 637 (ALT 250 FOR IP): Performed by: NURSE PRACTITIONER

## 2020-12-13 PROCEDURE — 6370000000 HC RX 637 (ALT 250 FOR IP): Performed by: PSYCHIATRY & NEUROLOGY

## 2020-12-13 RX ORDER — RISPERIDONE 2 MG/1
2 TABLET, FILM COATED ORAL 2 TIMES DAILY
Status: DISCONTINUED | OUTPATIENT
Start: 2020-12-13 | End: 2020-12-15

## 2020-12-13 RX ADMIN — MAGNESIUM HYDROXIDE 30 ML: 2400 SUSPENSION ORAL at 08:26

## 2020-12-13 RX ADMIN — RISPERIDONE 2 MG: 2 TABLET, FILM COATED ORAL at 21:22

## 2020-12-13 RX ADMIN — RISPERIDONE 1 MG: 1 TABLET, FILM COATED ORAL at 08:24

## 2020-12-13 RX ADMIN — Medication 3 MG: at 21:22

## 2020-12-13 ASSESSMENT — PAIN SCALES - GENERAL
PAINLEVEL_OUTOF10: 0
PAINLEVEL_OUTOF10: 0

## 2020-12-13 NOTE — PROGRESS NOTES
Patient was isolative to room. Denies thoughts of harm to others, or seeing shadows or hearing voices. Verbalizes still having thoughts of suicide,no plan ,contracts for safety. Verbalizes that he feels some one is still out to get him and thinks when out on unit people are talking and making fun of him. Verbalizes depression is 4 out of 10 because in the hospital after being in [facility] 2 weeks ago. Anxiety is 10 out of 10 when close to people unknown to him. Verbalizes no problem with appetite and sleep was good last night. Compliant with medications except melatonin. Does not attend groups. Safety rounds continue.

## 2020-12-13 NOTE — PROGRESS NOTES
activity     Days per week: Not on file     Minutes per session: Not on file    Stress: Not on file   Relationships    Social connections     Talks on phone: Not on file     Gets together: Not on file     Attends Caodaism service: Not on file     Active member of club or organization: Not on file     Attends meetings of clubs or organizations: Not on file     Relationship status: Not on file    Intimate partner violence     Fear of current or ex partner: Not on file     Emotionally abused: Not on file     Physically abused: Not on file     Forced sexual activity: Not on file   Other Topics Concern    Not on file   Social History Narrative    Not on file           ROS:  [x] All negative/unchanged except if checked.  Explain positive(checked items) below:  [] Constitutional  [] Eyes  [] Ear/Nose/Mouth/Throat  [] Respiratory  [] CV  [] GI  []   [] Musculoskeletal  [] Skin/Breast  [] Neurological  [] Endocrine  [] Heme/Lymph  [] Allergic/Immunologic    Explanation:     MEDICATIONS:    Current Facility-Administered Medications:     risperiDONE (RISPERDAL) tablet 1 mg, 1 mg, Oral, Daily, Soco Reza APRN - CNP, 1 mg at 12/13/20 2112    risperiDONE (RISPERDAL) tablet 2 mg, 2 mg, Oral, Nightly, Soco Reza, APRN - CNP, 2 mg at 12/12/20 2053    melatonin tablet 3 mg, 3 mg, Oral, Nightly, Alexandr Chowdhury APRN - CNP, 3 mg at 12/11/20 2123    acetaminophen (TYLENOL) tablet 650 mg, 650 mg, Oral, Q6H PRN, Jerrod Dejesus MD    haloperidol lactate (HALDOL) injection 5 mg, 5 mg, Intramuscular, Q6H PRN **OR** haloperidol (HALDOL) tablet 5 mg, 5 mg, Oral, Q6H PRN, Jerrod Dejesus MD, 5 mg at 12/08/20 2207    traZODone (DESYREL) tablet 50 mg, 50 mg, Oral, Nightly PRN, Jerrod Dejesus MD, 50 mg at 12/10/20 2034    magnesium hydroxide (MILK OF MAGNESIA) 400 MG/5ML suspension 30 mL, 30 mL, Oral, Daily PRN, Jerrod Dejesus MD, 30 mL at 12/13/20 0826    aluminum & magnesium hydroxide-simethicone (MAALOX)

## 2020-12-13 NOTE — PLAN OF CARE
Problem: Depressive Behavior With or Without Suicide Precautions:  Goal: Able to verbalize and/or display a decrease in depressive symptoms  Description: Able to verbalize and/or display a decrease in depressive symptoms  Outcome: Ongoing  Goal: Ability to disclose and discuss suicidal ideas will improve  Description: Ability to disclose and discuss suicidal ideas will improve  Outcome: Ongoing         Patient remains isolative to room. Still paranoid of people. Comes out for needs. Showered. Brighten on approach. Still verbalizing fleeting suicidal thoughts but denies a plan or intent. Does report improvement In those thoughts. Denies homicidal thoughts. Denies hallucinations.

## 2020-12-13 NOTE — PLAN OF CARE
Isolating himself to his room. Denies suicidal thoughts or intent to harm himself or others. Denies suicidal thoughts at this time but is still expressing thoughts of suspicion and verbalizes little. Mood is sad, affect is depressed. Denies hallucinations.

## 2020-12-14 PROCEDURE — 6370000000 HC RX 637 (ALT 250 FOR IP): Performed by: NURSE PRACTITIONER

## 2020-12-14 PROCEDURE — 99231 SBSQ HOSP IP/OBS SF/LOW 25: CPT | Performed by: NURSE PRACTITIONER

## 2020-12-14 PROCEDURE — 1240000000 HC EMOTIONAL WELLNESS R&B

## 2020-12-14 PROCEDURE — 6370000000 HC RX 637 (ALT 250 FOR IP): Performed by: PSYCHIATRY & NEUROLOGY

## 2020-12-14 RX ORDER — BENZTROPINE MESYLATE 0.5 MG/1
0.5 TABLET ORAL 2 TIMES DAILY
Status: DISCONTINUED | OUTPATIENT
Start: 2020-12-14 | End: 2020-12-21 | Stop reason: HOSPADM

## 2020-12-14 RX ORDER — DIVALPROEX SODIUM 250 MG/1
250 TABLET, DELAYED RELEASE ORAL EVERY 12 HOURS SCHEDULED
Status: DISCONTINUED | OUTPATIENT
Start: 2020-12-14 | End: 2020-12-17

## 2020-12-14 RX ADMIN — TRAZODONE HYDROCHLORIDE 50 MG: 50 TABLET ORAL at 20:36

## 2020-12-14 RX ADMIN — BENZTROPINE MESYLATE 0.5 MG: 0.5 TABLET ORAL at 20:37

## 2020-12-14 RX ADMIN — DIVALPROEX SODIUM 250 MG: 250 TABLET, DELAYED RELEASE ORAL at 20:37

## 2020-12-14 RX ADMIN — MAGNESIUM HYDROXIDE 30 ML: 2400 SUSPENSION ORAL at 17:38

## 2020-12-14 RX ADMIN — BENZTROPINE MESYLATE 0.5 MG: 0.5 TABLET ORAL at 10:23

## 2020-12-14 RX ADMIN — RISPERIDONE 2 MG: 2 TABLET, FILM COATED ORAL at 08:31

## 2020-12-14 RX ADMIN — RISPERIDONE 2 MG: 2 TABLET, FILM COATED ORAL at 20:37

## 2020-12-14 ASSESSMENT — PAIN SCALES - GENERAL
PAINLEVEL_OUTOF10: 0
PAINLEVEL_OUTOF10: 0

## 2020-12-14 NOTE — PROGRESS NOTES
PT. REPORTS FLEETING SUICIDAL IDEATIONS WITH NO PLAN OR INTENT, CONTRACTS FOR SAFETY. PT. DENIES ACTIVE HALLUCINATIONS. NO VOICED DELUSIONS, BUT REPORTS ANXIETY WHEN AROUND OTHERS. EATS IN ROOM. GROUPS ENCOURAGED. IN CONTROL WITH NO UNIT PROBLEMS. PT. REPORTS CONSTIPATION WITH NO BM FOR 1 WEEK. PT. HAS ACTIVE BOWEL SOUNDS X4 QUADS, ABDOMEN IS FLAT, NON DISTENDED, NO PAIN. Sylvia Ramírez, PSYCH NP NOTIFIED OF [address].
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
## 2020-12-14 NOTE — PROGRESS NOTES
BEHAVIORAL HEALTH FOLLOW-UP NOTE     12/14/2020     Patient was seen and examined in person, Chart reviewed   Patient's case discussed with staff/team      Patient personally seen and examined by me mental status exam performed. Agree with below assessment by the medical student. Psychomotor evaluation with no agitation retardation any abnormal moods. Eye contact is fair speech is normal rate and tone. His mood is \"I feel okay. \"  Affect is mood incongruent is flat and blunted. Thought process is slow thought content is devoid of any auditory visualizations he endorses paranoia. He denies suicidal homicidal ideations intent or plans impulse control is fair his cognitive function peers to be below his baseline his insight judgment is poor he is alert oriented time place and person          Chief Complaint: \"My paranoia is improving\"    Interim History: Patient seen in treatment team today. He states that he tried last night to go out of his room and walk around for 15 minutes. He states that when he is out on the unit with everyone he just feels increasing anxiety in general, but not targeted towards any one person or thing. He states that he also feels uncomfortable with people watching him eat. His main complaint this morning is that he believes the Risperdal is causing his hands and arms to \"fall asleep\" at night. He could not recall if this happened in the past with any other medications. He was agreeable to trying cogentin to improve this. He denies suicidal ideation, homicidal ideation, auditory and visual hallucinations.       Appetite:   [x] Normal/Unchanged  [] Increased  [] Decreased      Sleep:       [x] Normal/Unchanged  [] Fair       [] Poor              Energy:    [x] Normal/Unchanged  [] Increased  [] Decreased        SI [] Present  [x] Absent    HI  []Present  [x] Absent     Aggression:  [] yes  [x] no    Patient is [x] able  [] unable to CONTRACT FOR SAFETY     PAST MEDICAL/PSYCHIATRIC HISTORY: Past Medical History:   Diagnosis Date    Colitis     1996       FAMILY/SOCIAL HISTORY:  Family History   Problem Relation Age of Onset    Mental Illness Mother      Social History     Socioeconomic History    Marital status: Single     Spouse name: Not on file    Number of children: 0    Years of education: 8th grade     Highest education level: Not on file   Occupational History    Not on file   Social Needs    Financial resource strain: Not on file    Food insecurity     Worry: Not on file     Inability: Not on file    Transportation needs     Medical: Not on file     Non-medical: Not on file   Tobacco Use    Smoking status: Current Every Day Smoker     Packs/day: 0.25     Types: Cigarettes     Start date: 1/8/2000    Smokeless tobacco: Never Used   Substance and Sexual Activity    Alcohol use: Yes     Alcohol/week: 28.0 standard drinks     Types: 28 Cans of beer per week     Comment: last few months     Drug use: Never    Sexual activity: Not Currently   Lifestyle    Physical activity     Days per week: Not on file     Minutes per session: Not on file    Stress: Not on file   Relationships    Social connections     Talks on phone: Not on file     Gets together: Not on file     Attends Zoroastrianism service: Not on file     Active member of club or organization: Not on file     Attends meetings of clubs or organizations: Not on file     Relationship status: Not on file    Intimate partner violence     Fear of current or ex partner: Not on file     Emotionally abused: Not on file     Physically abused: Not on file     Forced sexual activity: Not on file   Other Topics Concern    Not on file   Social History Narrative    Not on file           ROS:  [x] All negative/unchanged except if checked.  Explain positive(checked items) below:  [] Constitutional  [] Eyes  [] Ear/Nose/Mouth/Throat  [] Respiratory  [] CV  [] GI  []   [] Musculoskeletal  [] Skin/Breast  [] Neurological  [] Endocrine  [] Heme/Lymph  [] Allergic/Immunologic    Explanation:     MEDICATIONS:    Current Facility-Administered Medications:     benztropine (COGENTIN) tablet 0.5 mg, 0.5 mg, Oral, BID, Tracey Hensonk, APRN - CNP, 0.5 mg at 12/14/20 1023    risperiDONE (RISPERDAL) tablet 2 mg, 2 mg, Oral, BID, Tracey Barros Dellick, APRN - CNP, 2 mg at 12/14/20 0831    melatonin tablet 3 mg, 3 mg, Oral, Nightly, Jef Tenorio, APRN - CNP, 3 mg at 12/13/20 2122    acetaminophen (TYLENOL) tablet 650 mg, 650 mg, Oral, Q6H PRN, Hortensia Ott MD    haloperidol lactate (HALDOL) injection 5 mg, 5 mg, Intramuscular, Q6H PRN **OR** haloperidol (HALDOL) tablet 5 mg, 5 mg, Oral, Q6H PRN, Hortensia Ott MD, 5 mg at 12/08/20 2207    traZODone (DESYREL) tablet 50 mg, 50 mg, Oral, Nightly PRN, Hortensia Ott MD, 50 mg at 12/10/20 2034    magnesium hydroxide (MILK OF MAGNESIA) 400 MG/5ML suspension 30 mL, 30 mL, Oral, Daily PRN, Hortensia Ott MD, 30 mL at 12/13/20 0826    aluminum & magnesium hydroxide-simethicone (MAALOX) 200-200-20 MG/5ML suspension 30 mL, 30 mL, Oral, PRN, Hortensia Ott MD    hydrOXYzine (VISTARIL) capsule 50 mg, 50 mg, Oral, TID PRN, Hortensia Ott MD, 50 mg at 12/08/20 2207      Examination:  BP 91/64   Pulse 65   Temp 98.4 °F (36.9 °C)   Resp 14   Ht 5' 6\" (1.676 m)   Wt 150 lb (68 kg)   SpO2 97%   BMI 24.21 kg/m²   Gait - steady  Medication side effects(SE): Denies    Mental Status Examination:    Level of consciousness:  within normal limits   Appearance:  fair grooming and fair hygiene  Behavior/Motor:  no abnormalities noted  Attitude toward examiner:  cooperative  Speech:  spontaneous, normal rate and normal volume   Mood: \" My mood is okay. \"  Affect: Flat and blunted  Thought processes: Linear without flight of ideas loose associations  Thought content: Endorses paranoia, denies SI/HI intent or plan   cognition:  oriented to person, place, and time   Concentration fair  Insight Limited  Judgement Limited    ASSESSMENT:   Patient symptoms are:  [] Well controlled  [x] Improving  [] Worsening  [] No change      Diagnosis:   Principal Problem:    Schizophrenia, paranoid ([facility]  Active Problems:    Acute psychosis ([facility]  Resolved Problems:    * No resolved hospital problems. *      LABS:    No results for input(s): WBC, HGB, PLT in the last 72 hours. No results for input(s): NA, K, CL, CO2, BUN, CREATININE, GLUCOSE in the last 72 hours. No results for input(s): BILITOT, ALKPHOS, AST, ALT in the last 72 hours. Lab Results   Component Value Date    LABAMPH NOT DETECTED 12/08/2020    BARBSCNU NOT DETECTED 12/08/2020    LABBENZ NOT DETECTED 12/08/2020    LABMETH NOT DETECTED 12/08/2020    OPIATESCREENURINE NOT DETECTED 12/08/2020    PHENCYCLIDINESCREENURINE NOT DETECTED 12/08/2020    ETOH <10 12/08/2020     No results found for: TSH, FREET4  No results found for: LITHIUM  Lab Results   Component Value Date    VALPROATE 47 (L) 12/08/2020         Treatment Plan:  Reviewed current Medications with the patient. Risks, benefits, side effects, drug-to-drug interactions and alternatives to treatment were discussed. Collateral information:   CD evaluation  Encourage patient to attend group and other milieu activities.   Discharge planning discussed with the patient and treatment team.      Continue Risperdal 2 mg twice daily for paranoia   Start Cogentin 0.5mg BID for numbness and tingling of the hands   staff confirmed patient received Abilify maintaina 400 mg IM on 11/29/2020          PSYCHOTHERAPY/COUNSELING:  [x] Therapeutic interview  [x] Supportive  [] CBT  [] Ongoing  [] Other    [x] Patient continues to need, on a daily basis, active treatment furnished directly by or requiring the supervision of inpatient psychiatric personnel      Anticipated Length of stay:            Electronically signed by Tim Sanchez on 12/14/2020 at 12:36 PM

## 2020-12-14 NOTE — CARE COORDINATION
Patel spoke with pt's aunt regarding discharge. Pt's aunt states that this pt is able to return home with her at the time of discharge, as long as he is stable. Pt's aunt states that there are weapons in the home, but they will be locked up prior to discharge. Patel offered to assist as needed. Pt's aunt added that this pt had fallen off a ladder a few years ago in which he hit his head. Pt's aunt is fearful he may have a TBI.  Will discuss this with the NP.

## 2020-12-14 NOTE — PLAN OF CARE
Problem: Depressive Behavior With or Without Suicide Precautions:  Goal: Ability to disclose and discuss suicidal ideas will improve  Description: Ability to disclose and discuss suicidal ideas will improve  12/14/2020 0847 by Negin Jones RN  Outcome: Ongoing  PT.  REPORTS FLEETING SUICIDAL IDEATIONS WITH NO PLAN OR INTENT.   12/14/2020 0847 by Negin Jones RN  Outcome: Met This Shift     Problem: Depressive Behavior With or Without Suicide Precautions:  Goal: Absence of self-harm  Description: Absence of self-harm  12/14/2020 0847 by Negin Jones RN  Outcome: Met This Shift  NO SELF HARM BEHAVIORS REPORTED OR OBSERVED.   12/14/2020 0847 by Negin Jones RN  Outcome: Met This Shift  12/14/2020 0358 by Sis Castillo RN  Outcome: Met This Shift

## 2020-12-14 NOTE — GROUP NOTE
Group Therapy Note    Date: 12/14/2020    Group Start Time: 1000  Group End Time: 5792  Group Topic: Psychoeducation    [facility]    Kassandra Kc, CTRS        Group Therapy Note      Number of participants: 15  Type of group: Psychoeducation  Mode of intervention: Education, Support, Socialization, Exploration, Clarifying, and Problem-solving  Topic: Time Management Skills  Objective: Pt will identify 1 way to improve time management skills in recovery. Notes:   Pt interactive during group sharing 1 way to improve time management skills in recovery. Pt gave support and feedback to others. Status After Intervention:  Improved    Participation Level:  Active Listener and Interactive    Participation Quality: Appropriate, Attentive, Sharing and Supportive      Speech:  normal      Thought Process/Content: Logical      Affective Functioning: Flat      Mood: depressed      Level of consciousness:  Alert, Oriented x4 and Attentive      Response to Learning: Able to verbalize current knowledge/experience, Able to verbalize/acknowledge new learning, Able to retain information, Capable of insight, Able to change behavior and Progressing to goal      Endings: None Reported    Modes of Intervention: Education, Support, Socialization, Exploration, Clarifying and Problem-solving

## 2020-12-14 NOTE — PLAN OF CARE
Problem: Depressive Behavior With or Without Suicide Precautions:  Goal: Ability to disclose and discuss suicidal ideas will improve  Description: Ability to disclose and discuss suicidal ideas will improve  Outcome: Met This Shift  PT. DENIES SUICIDAL IDEATIONS. Problem: Depressive Behavior With or Without Suicide Precautions:  Goal: Absence of self-harm  Description: Absence of self-harm  12/14/2020 0847 by Virgil Lake RN  Outcome: Met This Shift  NO SELF HARM.    12/14/2020 0358 by Joseph Henderson RN  Outcome: Met This Shift

## 2020-12-14 NOTE — PLAN OF CARE
Patient is resting quietly in bed with eyes closed at this time. No signs of distress or discomfort noted. No PRN medications given thus far. Safety needs met. No unit problems reported. Will continue to observe and support.      Problem: Depressive Behavior With or Without Suicide Precautions:  Goal: Absence of self-harm  Description: Absence of self-harm  12/14/2020 0358 by Conner Lau RN  Outcome: Met This Shift  12/13/2020 1716 by Tolu Gannon RN  Outcome: Met This Shift     Problem: Suicide risk  Goal: Provide patient with safe environment  Description: Provide patient with safe environment  Outcome: Met This Shift

## 2020-12-15 ENCOUNTER — APPOINTMENT (OUTPATIENT)
Dept: GENERAL RADIOLOGY | Age: 35
DRG: 885 | End: 2020-12-15
Payer: COMMERCIAL

## 2020-12-15 PROBLEM — K59.00 CONSTIPATION: Status: ACTIVE | Noted: 2020-12-15

## 2020-12-15 PROCEDURE — 1240000000 HC EMOTIONAL WELLNESS R&B

## 2020-12-15 PROCEDURE — 6370000000 HC RX 637 (ALT 250 FOR IP): Performed by: NURSE PRACTITIONER

## 2020-12-15 PROCEDURE — 99232 SBSQ HOSP IP/OBS MODERATE 35: CPT | Performed by: NURSE PRACTITIONER

## 2020-12-15 PROCEDURE — 74018 RADEX ABDOMEN 1 VIEW: CPT

## 2020-12-15 RX ORDER — DOCUSATE SODIUM 100 MG/1
100 CAPSULE, LIQUID FILLED ORAL 2 TIMES DAILY
Status: DISCONTINUED | OUTPATIENT
Start: 2020-12-15 | End: 2020-12-21 | Stop reason: HOSPADM

## 2020-12-15 RX ORDER — MAGNESIUM HYDROXIDE/ALUMINUM HYDROXICE/SIMETHICONE 120; 1200; 1200 MG/30ML; MG/30ML; MG/30ML
30 SUSPENSION ORAL DAILY
Status: DISCONTINUED | OUTPATIENT
Start: 2020-12-15 | End: 2020-12-21 | Stop reason: HOSPADM

## 2020-12-15 RX ORDER — POLYETHYLENE GLYCOL 3350 17 G/17G
17 POWDER, FOR SOLUTION ORAL DAILY
Status: DISCONTINUED | OUTPATIENT
Start: 2020-12-15 | End: 2020-12-21 | Stop reason: HOSPADM

## 2020-12-15 RX ORDER — ARIPIPRAZOLE 10 MG/1
10 TABLET ORAL DAILY
Status: DISCONTINUED | OUTPATIENT
Start: 2020-12-16 | End: 2020-12-17

## 2020-12-15 RX ADMIN — DIVALPROEX SODIUM 250 MG: 250 TABLET, DELAYED RELEASE ORAL at 08:55

## 2020-12-15 RX ADMIN — BENZTROPINE MESYLATE 0.5 MG: 0.5 TABLET ORAL at 20:34

## 2020-12-15 RX ADMIN — MAGNESIUM HYDROXIDE/ALUMINUM HYDROXICE/SIMETHICONE 30 ML: 120; 1200; 1200 SUSPENSION ORAL at 16:50

## 2020-12-15 RX ADMIN — DOCUSATE SODIUM 100 MG: 100 CAPSULE ORAL at 10:54

## 2020-12-15 RX ADMIN — BENZTROPINE MESYLATE 0.5 MG: 0.5 TABLET ORAL at 08:55

## 2020-12-15 RX ADMIN — Medication 3 MG: at 20:34

## 2020-12-15 RX ADMIN — DOCUSATE SODIUM 100 MG: 100 CAPSULE ORAL at 20:34

## 2020-12-15 RX ADMIN — DIVALPROEX SODIUM 250 MG: 250 TABLET, DELAYED RELEASE ORAL at 20:34

## 2020-12-15 RX ADMIN — POLYETHYLENE GLYCOL 3350 17 G: 17 POWDER, FOR SOLUTION ORAL at 10:54

## 2020-12-15 ASSESSMENT — PAIN SCALES - GENERAL
PAINLEVEL_OUTOF10: 0
PAINLEVEL_OUTOF10: 0

## 2020-12-15 NOTE — PROGRESS NOTES
drinks     Types: 28 Cans of beer per week     Comment: last few months     Drug use: Never    Sexual activity: Not Currently   Lifestyle    Physical activity     Days per week: Not on file     Minutes per session: Not on file    Stress: Not on file   Relationships    Social connections     Talks on phone: Not on file     Gets together: Not on file     Attends Voodoo service: Not on file     Active member of club or organization: Not on file     Attends meetings of clubs or organizations: Not on file     Relationship status: Not on file    Intimate partner violence     Fear of current or ex partner: Not on file     Emotionally abused: Not on file     Physically abused: Not on file     Forced sexual activity: Not on file   Other Topics Concern    Not on file   Social History Narrative    Not on file           ROS:  [x] All negative/unchanged except if checked.  Explain positive(checked items) below:  [] Constitutional  [] Eyes  [] Ear/Nose/Mouth/Throat  [] Respiratory  [] CV  [] GI  []   [] Musculoskeletal  [] Skin/Breast  [] Neurological  [] Endocrine  [] Heme/Lymph  [] Allergic/Immunologic    Explanation:     MEDICATIONS:    Current Facility-Administered Medications:     polyethylene glycol (GLYCOLAX) packet 17 g, 17 g, Oral, Daily, Paula Bhatt APRN - NP, 17 g at 12/15/20 1054    docusate sodium (COLACE) capsule 100 mg, 100 mg, Oral, BID, SEGUN Calvillo - NP, 100 mg at 12/15/20 1054    aluminum & magnesium hydroxide-simethicone (MAALOX) 805-375-67 MG/5ML suspension 30 mL, 30 mL, Oral, Daily, Paula Woods APRELI - NP, 30 mL at 12/15/20 1650    [START ON 12/16/2020] ARIPiprazole (ABILIFY) tablet 10 mg, 10 mg, Oral, Daily, Che Reza APRN - CNP    benztropine (COGENTIN) tablet 0.5 mg, 0.5 mg, Oral, BID, SEGUN Berger - CNP, 0.5 mg at 12/15/20 0855    divalproex (DEPAKOTE) DR tablet 250 mg, 250 mg, Oral, 2 times per day, SEGUN Sloan - CNP, 089 mg at 12/15/20 0855   melatonin tablet 3 mg, 3 mg, Oral, Nightly, Ortega Hannah Reza, APRN - CNP, 3 mg at 12/13/20 2122    acetaminophen (TYLENOL) tablet 650 mg, 650 mg, Oral, Q6H PRN, Trevor Sutherland MD    haloperidol lactate (HALDOL) injection 5 mg, 5 mg, Intramuscular, Q6H PRN **OR** haloperidol (HALDOL) tablet 5 mg, 5 mg, Oral, Q6H PRN, Trevor Sutherland MD, 5 mg at 12/08/20 2207    traZODone (DESYREL) tablet 50 mg, 50 mg, Oral, Nightly PRN, Colon Alfonsoibb, MD, 50 mg at 12/14/20 2036    magnesium hydroxide (MILK OF MAGNESIA) 400 MG/5ML suspension 30 mL, 30 mL, Oral, Daily PRN, Colon Koko MD, 30 mL at 12/14/20 1738    hydrOXYzine (VISTARIL) capsule 50 mg, 50 mg, Oral, TID PRN, Trevor Sutherland MD, 50 mg at 12/08/20 2207      Examination:  BP (!) 96/52   Pulse 63   Temp 97.9 °F (36.6 °C) (Temporal)   Resp 12   Ht 5' 6\" (1.676 m)   Wt 150 lb (68 kg)   SpO2 97%   BMI 24.21 kg/m²   Gait - steady  Medication side effects(SE): Denies    Mental Status Examination:    Level of consciousness:  within normal limits   Appearance:  fair grooming and fair hygiene  Behavior/Motor:  no abnormalities noted  Attitude toward examiner:  cooperative  Speech:  spontaneous, normal rate and normal volume   Mood: \" My mood is okay. \"  Affect: Flat and blunted  Thought processes: Linear without flight of ideas loose associations  Thought content: Endorses paranoia, denies auditory visualizations endorses suicidal ideations but no homicidal ideations intent or plan cognition:  oriented to person, place, and time   Concentration fair  Insight Limited  Judgement Limited    ASSESSMENT:   Patient symptoms are:  [] Well controlled  [x] Improving  [] Worsening  [] No change      Diagnosis:   Principal Problem:    Schizophrenia, paranoid ([facility]  Active Problems:    Acute psychosis ([facility]    Constipation  Resolved Problems:    * No resolved hospital problems. *      LABS:    No results for input(s): WBC, HGB, PLT in the last 72 hours.   No results for

## 2020-12-15 NOTE — GROUP NOTE
Group Therapy Note    Date: 12/15/2020    Group Start Time: 1125  Group End Time: 1200  Group Topic: Cognitive Skills    [facility]    LATA Bowman        Group Therapy Note    Attendees: 14         Patient's Goal:  Pt will be able to identify characteristics of healthy vs unhealthy relationships. Notes:  Pt participated in class discussion and activity. Status After Intervention:  Improved    Participation Level:  Active Listener and Interactive    Participation Quality: Appropriate, Attentive, Sharing and Supportive      Speech:  normal      Thought Process/Content: Logical      Affective Functioning: Blunted      Mood: depressed      Level of consciousness:  Alert, Oriented x4 and Attentive      Response to Learning: Able to verbalize current knowledge/experience, Able to verbalize/acknowledge new learning and Progressing to goal      Endings: None Reported    Modes of Intervention: Education, Support, Socialization and Problem-solving      Discipline Responsible: /Counselor      Signature:  LATA Loving

## 2020-12-15 NOTE — GROUP NOTE
Group Therapy Note    Date: 12/15/2020    Group Start Time: 1000  Group End Time: 3804  Group Topic: Psychoeducation    [facility]    Kassandra Kc, CTRS        Group Therapy Note      Number of participants: 16  Type of group: Psychoeducation  Mode of intervention: Education, Support, Socialization, Exploration, Clarifying, and Problem-solving  Topic: Mental Health Maintenance Plan  Objective: Pt will develop a maintenance plan and identify 1 way to implement plan post discharge. Patient's Goal:  \"Stay positive\"     Notes:  Pt offered minimal interaction during group but was an active listener throughout. Pt able to share 1 way to implement plan post discharge. Status After Intervention:  Unchanged    Participation Level:  Active Listener and Minimal    Participation Quality: Appropriate and Attentive      Speech:  normal      Thought Process/Content: Logical      Affective Functioning: Flat      Mood: depressed      Level of consciousness:  Alert, Oriented x4 and Attentive      Response to Learning: Progressing to goal      Endings: None Reported    Modes of Intervention: Education, Support, Socialization, Exploration, Clarifying and Problem-solving

## 2020-12-15 NOTE — CONSULTS
Hospital Medicine  Consult History & Physical        Reason for consult:  Constipiaton    Date of Service: Pt seen/examined in consultation on 12/15/2020    History Of Present Illness:    Mr. Tiffanie Choe, a 38y.o. year old male  who  has a past medical history of Colitis. Patient mended to [facility] on 12/8/2020 after presenting to ED with suicidal ideation with plan to use carbon monoxide from his car exhaust.  We were asked to see/evaluate the patient for constipation. Patient reportedly has not had a bowel movement in 1 week despite 3 doses of milk of magnesia over the past 3 days. Patient states that he has a history of intermittent constipation which is typically relieved by over-the-counter medication eventually. Patient denies any abdominal pain though he does have intermittent cramping. He denies any nausea/vomiting. He does have a recorded history of colitis, however, he is unsure of the details. Past Medical History:        Diagnosis Date    Colitis     1996       Past Surgical History:        Procedure Laterality Date    COLONOSCOPY      FRACTURE SURGERY      HIP SURGERY Right     age 10 fractured hip        Medications Prior to Admission:    Prior to Admission medications    Medication Sig Start Date End Date Taking? Authorizing Provider   ARIPiprazole (ABILIFY) 10 MG tablet Take 10 mg by mouth nightly Last dose to be taken 12/12/20   Yes Historical Provider, MD   ARIPiprazole ER (ABILIFY MAINTENA) 400 MG PRSY Inject 400 mg into the muscle every 30 days Last dose 11/29/20 12/29/20  Yes Historical Provider, MD   divalproex (DEPAKOTE) 500 MG DR tablet Take 1,000 mg by mouth nightly   Yes Historical Provider, MD       Allergies:  Patient has no known allergies. Social History:      TOBACCO:   reports that he has been smoking cigarettes. He started smoking about 20 years ago. He has been smoking about 0.25 packs per day.  He has never used smokeless tobacco.  ETOH:   reports current alcohol use of about 28.0 standard drinks of alcohol per week. Family History:     Reviewed in detail and negative for DM, CAD, Cancer, CVA. Positive as follows:        Problem Relation Age of Onset    Mental Illness Mother        REVIEW OF SYSTEMS:   Pertinent positives as noted in the HPI. All other systems reviewed and negative. PHYSICAL EXAM:  BP (!) 96/52   Pulse 63   Temp 97.9 °F (36.6 °C) (Temporal)   Resp 12   Ht 5' 6\" (1.676 m)   Wt 150 lb (68 kg)   SpO2 97%   BMI 24.21 kg/m²      General appearance: No apparent distress, appears stated age and cooperative. HEENT: Normal cephalic, atraumatic without obvious deformity. Pupils equal, round, and reactive to light. Extra ocular muscles intact. Conjunctivae/corneas clear. Neck: Supple, with full range of motion. No jugular venous distention. Trachea midline. Respiratory:  Clear to auscultation bilaterally. No apparent distress. Cardiovascular:  Regular rate and rhythm. S1, S2 without murmurs, rubs, or gallops. PV: Brisk capillary refill. +2 pedal and radial pulses bilaterally. No clubbing, cyanosis, edema of bilateral lower extremities. Abdomen: Soft, non-tender, non-distended. +BS, though hypoactive. Musculoskeletal: No obvious deformities or erythematous or edematous joints. Skin: Normal skin color. No rashes or lesions. Neurologic:  Neurovascularly intact without any focal sensory/motor deficits. Cranial nerves: II-XII intact, grossly non-focal.  Psychiatric: Alert and oriented, thought content appropriate, normal insight    Labs:     Hgb A1C:   Lab Results   Component Value Date    LABA1C 5.5 12/09/2020     ASSESSMENT:  Principal Problem:    Schizophrenia, paranoid ([facility]  Active Problems:    Acute psychosis ([facility]    Constipation  Resolved Problems:    * No resolved hospital problems. *       PLAN:    KUB positive for constipation but no obstructions. Continue MOM PRN, add glycolax and colace. Encourage oral fluids.       Thanks for allowing us to participate in the care of [facility]. We will continue to follow along. Please do not hesitate to contact us if needed.    +++++++++++++++++++++++++++++++++++++++++++++++++  Karlene Ramos, [address] [place] [address]  +++++++++++++++++++++++++++++++++++++++++++++++++  NOTE: This report was transcribed using voice recognition software. Every effort was made to ensure accuracy; however, inadvertent computerized transcription errors may be present.

## 2020-12-15 NOTE — PROGRESS NOTES
ATTENDED 1 GROUP. EATS MEALS IN ROOM. PT. REPORTS CONCERNS ABOUT OTHERS\"WATCHING ME WHEN I EAT\". PT. REPORTS FLEETING SUICIDAL IDEATIONS ARE DECREASED IN FREQUENCY. DENIES PLAN OR INTENT. VOICED CONCERNS OVER NO BM IN 1 WEEK, ORDERS OBTAINED. KUB COMPLETED. PT. REFUSED A.M. MEDICATIONS, \"I DO NOT LIKE THE WAY IT MAKES ME FEEL\".

## 2020-12-15 NOTE — PLAN OF CARE
Problem: Depressive Behavior With or Without Suicide Precautions:  Goal: Ability to disclose and discuss suicidal ideas will improve  Description: Ability to disclose and discuss suicidal ideas will improve  12/15/2020 1026 by Sun Raymundo RN  Outcome: Ongoing  PT. REPORTS FLEETING SUICIDAL IDEATIONS WITH NO PLAN OR INTENT AT THIS TIME.   12/14/2020 2314 by Dasha Bales RN  Outcome: Ongoing     Problem: Depressive Behavior With or Without Suicide Precautions:  Goal: Absence of self-harm  Description: Absence of self-harm  Outcome: Met This Shift  NO SELF HARM BEHAVIORS REPORTED OR OBSERVED.

## 2020-12-16 PROCEDURE — 99231 SBSQ HOSP IP/OBS SF/LOW 25: CPT | Performed by: NURSE PRACTITIONER

## 2020-12-16 PROCEDURE — 6370000000 HC RX 637 (ALT 250 FOR IP): Performed by: INTERNAL MEDICINE

## 2020-12-16 PROCEDURE — 6370000000 HC RX 637 (ALT 250 FOR IP): Performed by: NURSE PRACTITIONER

## 2020-12-16 PROCEDURE — 1240000000 HC EMOTIONAL WELLNESS R&B

## 2020-12-16 RX ORDER — SENNA PLUS 8.6 MG/1
2 TABLET ORAL NIGHTLY
Status: DISCONTINUED | OUTPATIENT
Start: 2020-12-16 | End: 2020-12-21 | Stop reason: HOSPADM

## 2020-12-16 RX ADMIN — POLYETHYLENE GLYCOL 3350 17 G: 17 POWDER, FOR SOLUTION ORAL at 09:50

## 2020-12-16 RX ADMIN — BENZTROPINE MESYLATE 0.5 MG: 0.5 TABLET ORAL at 09:50

## 2020-12-16 RX ADMIN — DOCUSATE SODIUM 100 MG: 100 CAPSULE ORAL at 09:50

## 2020-12-16 RX ADMIN — DIVALPROEX SODIUM 250 MG: 250 TABLET, DELAYED RELEASE ORAL at 21:11

## 2020-12-16 RX ADMIN — MAGNESIUM HYDROXIDE/ALUMINUM HYDROXICE/SIMETHICONE 30 ML: 120; 1200; 1200 SUSPENSION ORAL at 09:50

## 2020-12-16 RX ADMIN — CITROMA MAGNESIUM CITRATE 296 ML: 1.75 LIQUID ORAL at 10:55

## 2020-12-16 RX ADMIN — BENZTROPINE MESYLATE 0.5 MG: 0.5 TABLET ORAL at 21:11

## 2020-12-16 RX ADMIN — ARIPIPRAZOLE 10 MG: 10 TABLET ORAL at 09:50

## 2020-12-16 RX ADMIN — DIVALPROEX SODIUM 250 MG: 250 TABLET, DELAYED RELEASE ORAL at 09:50

## 2020-12-16 RX ADMIN — Medication 3 MG: at 21:11

## 2020-12-16 RX ADMIN — DOCUSATE SODIUM 100 MG: 100 CAPSULE ORAL at 21:11

## 2020-12-16 ASSESSMENT — PAIN SCALES - GENERAL
PAINLEVEL_OUTOF10: 0
PAINLEVEL_OUTOF10: 0

## 2020-12-16 NOTE — PLAN OF CARE
Problem: Depressive Behavior With or Without Suicide Precautions:  Goal: Ability to disclose and discuss suicidal ideas will improve  Description: Ability to disclose and discuss suicidal ideas will improve  12/16/2020 1051 by Oliver Diaz RN  Outcome: Ongoing  PT. REPORTS FLEETING SUICIDAL IDEATIONS WITH NO PLAN OR INTENT.   12/15/2020 2128 by Sera Pedraza RN  Outcome: Ongoing     Problem: Depressive Behavior With or Without Suicide Precautions:  Goal: Absence of self-harm  Description: Absence of self-harm  Outcome: Met This Shift  NO SELF HARM.

## 2020-12-16 NOTE — PROGRESS NOTES
UP ON UNIT AT INTERVALS. IN CONTROL. NO SELF HARM. GOING TO GROUPS AND HAS BEEN MEDICATION COMPLIANT. PT.REPORTS FLEETING SUICIDAL IDEATIONS ARE DECREASING IN FREQUENCY. PT.DENIES ANY SUICIDAL PLAN OR INTENT.    PT.DENIES ANY B.M. THIS SHIFT. BOWEL SOUNDS ARE HYPOACTIVE. ABDOMEN FIRM. FLUIDS ENCOURAGED.

## 2020-12-16 NOTE — PROGRESS NOTES
input(s): PROT, ALB, ALKPHOS, ALT, AST, BILITOT, AMYLASE, LIPASE in the last 72 hours. No results for input(s): INR in the last 72 hours. No results for input(s): Johnson Talmage in the last 72 hours. Chronic labs:    Lab Results   Component Value Date    CHOL 183 12/09/2020    TRIG 69 12/09/2020    HDL 71 12/09/2020    LDLCALC 98 12/09/2020    LABA1C 5.5 12/09/2020       Radiology: REVIEWED DAILY    +++++++++++++++++++++++++++++++++++++++++++++++++  Maxwell Yu Physician - Hospitalist  [address], [place]  +++++++++++++++++++++++++++++++++++++++++++++++++  NOTE: This report was transcribed using voice recognition software. Every effort was made to ensure accuracy; however, inadvertent computerized transcription errors may be present.

## 2020-12-16 NOTE — PROGRESS NOTES
acetaminophen (TYLENOL) tablet 650 mg, 650 mg, Oral, Q6H PRN, Cheri Lock, MD    haloperidol lactate (HALDOL) injection 5 mg, 5 mg, Intramuscular, Q6H PRN **OR** haloperidol (HALDOL) tablet 5 mg, 5 mg, Oral, Q6H PRN, Cheri Lock, MD, 5 mg at 12/08/20 2207    traZODone (DESYREL) tablet 50 mg, 50 mg, Oral, Nightly PRN, Cheri Lock, MD, 50 mg at 12/14/20 2036    magnesium hydroxide (MILK OF MAGNESIA) 400 MG/5ML suspension 30 mL, 30 mL, Oral, Daily PRN, Cheri Lock MD, 30 mL at 12/14/20 1738    hydrOXYzine (VISTARIL) capsule 50 mg, 50 mg, Oral, TID PRN, Cheri Lock, MD, 50 mg at 12/08/20 2207      Examination:  BP (!) 89/54   Pulse 66   Temp 97.6 °F (36.4 °C)   Resp 16   Ht 5' 6\" (1.676 m)   Wt 150 lb (68 kg)   SpO2 97%   BMI 24.21 kg/m²   Gait - steady  Medication side effects(SE): Denies    Mental Status Examination:    Level of consciousness:  within normal limits   Appearance:  fair grooming and fair hygiene  Behavior/Motor:  no abnormalities noted  Attitude toward examiner:  cooperative  Speech:  spontaneous, normal rate and normal volume   Mood: \" My mood is okay. \"  Affect: A little brighter today   thought processes: Linear without flight of ideas loose associations  Thought content: Denies suicidal homicidal ideations intent or plan denies auditory visual hallucinations or paranoia still guarded and isolative   ideations intent or plan cognition:  oriented to person, place, and time   Concentration fair  Insight Limited  Judgement Limited    ASSESSMENT:   Patient symptoms are:  [] Well controlled  [x] Improving  [] Worsening  [] No change      Diagnosis:   Principal Problem:    Schizophrenia, paranoid ([facility] [address]  Active Problems:    Acute psychosis ([facility]    Constipation  Resolved Problems:    * No resolved hospital problems. *      LABS:    No results for input(s): WBC, HGB, PLT in the last 72 hours.   No results for input(s): NA, K, CL, CO2, BUN, CREATININE, GLUCOSE in the last 72 hours. No results for input(s): BILITOT, ALKPHOS, AST, ALT in the last 72 hours. Lab Results   Component Value Date    LABAMPH NOT DETECTED 12/08/2020    BARBSCNU NOT DETECTED 12/08/2020    LABBENZ NOT DETECTED 12/08/2020    LABMETH NOT DETECTED 12/08/2020    OPIATESCREENURINE NOT DETECTED 12/08/2020    PHENCYCLIDINESCREENURINE NOT DETECTED 12/08/2020    ETOH <10 12/08/2020     No results found for: TSH, FREET4  No results found for: LITHIUM  Lab Results   Component Value Date    VALPROATE 47 (L) 12/08/2020         Treatment Plan:  Reviewed current Medications with the patient. Risks, benefits, side effects, drug-to-drug interactions and alternatives to treatment were discussed. Collateral information:   CD evaluation  Encourage patient to attend group and other milieu activities.   Discharge planning discussed with the patient and treatment team.      Continue Abilify 10 mg daily for psychosis  Start Cogentin 0.5mg BID for numbness and tingling of the hands   Continue Depakote 250 mg twice daily  staff confirmed patient received Abilify maintaina 400 mg IM on 11/29/2020          PSYCHOTHERAPY/COUNSELING:  [x] Therapeutic interview  [x] Supportive  [] CBT  [] Ongoing  [] Other    [x] Patient continues to need, on a daily basis, active treatment furnished directly by or requiring the supervision of inpatient psychiatric personnel      Anticipated Length of stay:            Electronically signed by SEGUN Ramsey CNP on 75/83/2474 at 9:47 AM

## 2020-12-16 NOTE — GROUP NOTE
Group Therapy Note    Date: 12/16/2020    Group Start Time: 1110  Group End Time: 0051  Group Topic: Cognitive Skills    [facility]    LATA Bowman        Group Therapy Note    Attendees: 17         Patient's Goal:  Pt will be able to discuss importance and principles of reflective listening. Notes: Pt was an active participant in class and demonstrated fair understanding of reflective listening. Status After Intervention:  Improved    Participation Level:  Active Listener and Interactive    Participation Quality: Appropriate, Attentive, Sharing and Supportive      Speech:  normal      Thought Process/Content: Logical      Affective Functioning: Blunted      Mood: depressed      Level of consciousness:  Alert, Oriented x4 and Attentive      Response to Learning: Able to verbalize current knowledge/experience, Able to verbalize/acknowledge new learning and Progressing to goal      Endings: None Reported    Modes of Intervention: Education, Support, Socialization and Problem-solving      Discipline Responsible: /Counselor      Signature:  LATA Gtz

## 2020-12-16 NOTE — PLAN OF CARE
Problem: Depressive Behavior With or Without Suicide Precautions:  Goal: Able to verbalize acceptance of life and situations over which he or she has no control  Description: Able to verbalize acceptance of life and situations over which he or she has no control  Outcome: Ongoing     Problem: Depressive Behavior With or Without Suicide Precautions:  Goal: Able to verbalize and/or display a decrease in depressive symptoms  Description: Able to verbalize and/or display a decrease in depressive symptoms  Outcome: Ongoing     Problem: Depressive Behavior With or Without Suicide Precautions:  Goal: Ability to disclose and discuss suicidal ideas will improve  Description: Ability to disclose and discuss suicidal ideas will improve  12/16/2020 1825 by Justin Tellez RN  Outcome: Ongoing  12/16/2020 1051 by Manuel Dominguez RN  Outcome: Ongoing     Pt denies suicidal ideations, homicidal ideations and hallucinations. Pt ambulating and increasing fluids to help with constipation. Pt calm and cooperative. Flat and sad. Good eye contact. Social with select few. Will continue to monitor.

## 2020-12-16 NOTE — PLAN OF CARE
Patient is resting quietly in bed with eyes closed at this time. No signs of distress or discomfort noted. No PRN medications given thus far. Safety needs met. No unit problems reported. Will continue to observe and support.      Problem: Altered Mood, Psychotic Behavior:  Goal: Absence of self-harm  Description: Absence of self-harm  Outcome: Met This Shift     Problem: Suicide risk  Goal: Provide patient with safe environment  Description: Provide patient with safe environment  Outcome: Met This Shift

## 2020-12-17 LAB — VALPROIC ACID LEVEL: 18 MCG/ML (ref 50–100)

## 2020-12-17 PROCEDURE — 6370000000 HC RX 637 (ALT 250 FOR IP): Performed by: NURSE PRACTITIONER

## 2020-12-17 PROCEDURE — 99231 SBSQ HOSP IP/OBS SF/LOW 25: CPT | Performed by: NURSE PRACTITIONER

## 2020-12-17 PROCEDURE — 6370000000 HC RX 637 (ALT 250 FOR IP): Performed by: INTERNAL MEDICINE

## 2020-12-17 PROCEDURE — 1240000000 HC EMOTIONAL WELLNESS R&B

## 2020-12-17 PROCEDURE — 36415 COLL VENOUS BLD VENIPUNCTURE: CPT

## 2020-12-17 PROCEDURE — 80164 ASSAY DIPROPYLACETIC ACD TOT: CPT

## 2020-12-17 RX ORDER — DIVALPROEX SODIUM 500 MG/1
500 TABLET, DELAYED RELEASE ORAL EVERY 12 HOURS SCHEDULED
Status: DISCONTINUED | OUTPATIENT
Start: 2020-12-17 | End: 2020-12-21 | Stop reason: HOSPADM

## 2020-12-17 RX ORDER — ARIPIPRAZOLE 15 MG/1
15 TABLET ORAL DAILY
Status: DISCONTINUED | OUTPATIENT
Start: 2020-12-18 | End: 2020-12-21 | Stop reason: HOSPADM

## 2020-12-17 RX ADMIN — DIVALPROEX SODIUM 500 MG: 250 TABLET, DELAYED RELEASE ORAL at 20:53

## 2020-12-17 RX ADMIN — DOCUSATE SODIUM 100 MG: 100 CAPSULE ORAL at 09:19

## 2020-12-17 RX ADMIN — POLYETHYLENE GLYCOL 3350 17 G: 17 POWDER, FOR SOLUTION ORAL at 09:19

## 2020-12-17 RX ADMIN — ARIPIPRAZOLE 10 MG: 10 TABLET ORAL at 09:19

## 2020-12-17 RX ADMIN — BENZTROPINE MESYLATE 0.5 MG: 0.5 TABLET ORAL at 09:19

## 2020-12-17 RX ADMIN — BENZTROPINE MESYLATE 0.5 MG: 0.5 TABLET ORAL at 20:53

## 2020-12-17 RX ADMIN — Medication 3 MG: at 20:53

## 2020-12-17 RX ADMIN — DOCUSATE SODIUM 100 MG: 100 CAPSULE ORAL at 20:53

## 2020-12-17 RX ADMIN — MAGNESIUM HYDROXIDE/ALUMINUM HYDROXICE/SIMETHICONE 30 ML: 120; 1200; 1200 SUSPENSION ORAL at 09:21

## 2020-12-17 RX ADMIN — SENNOSIDES 17.2 MG: 8.6 TABLET, FILM COATED ORAL at 20:53

## 2020-12-17 RX ADMIN — DIVALPROEX SODIUM 250 MG: 250 TABLET, DELAYED RELEASE ORAL at 09:19

## 2020-12-17 ASSESSMENT — PAIN SCALES - GENERAL
PAINLEVEL_OUTOF10: 0
PAINLEVEL_OUTOF10: 0

## 2020-12-17 NOTE — BH NOTE
Pt is stable, negative changes or additional distress. Pt denies suicidal or homicidal ideations. Pt denies hallucinations. No other distress presently. Will follow and monitor.

## 2020-12-17 NOTE — GROUP NOTE
Group Therapy Note    Date: 12/17/2020    Group Start Time: 1110  Group End Time: 1150  Group Topic: Cognitive Skills    [facility]    JESSIE Jeter LSW        Group Therapy Note    Attendees: 10         Patient's Goal:  Pt will be able to verbalize understanding of positive self-affirmations     Notes:  Pt made connections and participated in group    Status After Intervention:  Improved    Participation Level:  Active Listener    Participation Quality: Appropriate, Attentive, Sharing and Supportive      Speech:  normal      Thought Process/Content: Logical      Affective Functioning: Congruent      Mood: depressed      Level of consciousness:  Alert and Oriented x4      Response to Learning: Able to verbalize current knowledge/experience      Endings: None Reported    Modes of Intervention: Education, Support, Socialization, Exploration, Clarifying, Problem-solving and Activity      Discipline Responsible: /Counselor      Signature:  JESSIE Jeter LSW

## 2020-12-17 NOTE — PROGRESS NOTES
Patient states he had a bm this am  He states it was satisfactory  He denies additional symptoms of constipation or abd pain  He appears comfortable  If no additional symptoms here should not be a need to re image kub  Please call again if additional questions or concerns

## 2020-12-17 NOTE — PLAN OF CARE
Pt is stable, appears flat and sad. Pt has poverty of content in speech. Pt denies suicidal or homicidal ideations. Pt denies hallucinations. Pt reports goal, \"to get out more\" pr pt. Will follow and monitor.

## 2020-12-17 NOTE — PROGRESS NOTES
BEHAVIORAL HEALTH FOLLOW-UP NOTE     12/17/2020     Patient was seen and examined in person, Chart reviewed   Patient's case discussed with staff/team      Chief Complaint: \" I am still feeling a little paranoid above able to eat outside\"    Interim History: Patient seen in his room. He has been attending some groups he has been eating lunch out on the unit although mostly continues to keep to himself on the unit. He still endorses some paranoia but denies any SI/HI intent or plan denies any auditory visual hallucinations. Eating well sleeping well no neurovegetative signs of depression. Is agreeable to stepdown to crisis stabilization unit tomorrow      Appetite:   [x] Normal/Unchanged  [] Increased  [] Decreased      Sleep:       [x] Normal/Unchanged  [] Fair       [] Poor              Energy:    [x] Normal/Unchanged  [] Increased  [] Decreased        SI [] Present  [x] Absent    HI  []Present  [x] Absent     Aggression:  [] yes  [x] no    Patient is [x] able  [] unable to CONTRACT FOR SAFETY     PAST MEDICAL/PSYCHIATRIC HISTORY:   Past Medical History:   Diagnosis Date    Colitis     1996       FAMILY/SOCIAL HISTORY:  Family History   Problem Relation Age of Onset    Mental Illness Mother      Social History     Socioeconomic History    Marital status: Single     Spouse name: Not on file    Number of children: 0    Years of education: 8th grade     Highest education level: Not on file   Occupational History    Not on file   Social Needs    Financial resource strain: Not on file    Food insecurity     Worry: Not on file     Inability: Not on file    Transportation needs     Medical: Not on file     Non-medical: Not on file   Tobacco Use    Smoking status: Current Every Day Smoker     Packs/day: 0.25     Types: Cigarettes     Start date: 1/8/2000    Smokeless tobacco: Never Used   Substance and Sexual Activity    Alcohol use:  Yes     Alcohol/week: 28.0 standard drinks     Types: 28 Cans of beer per week     Comment: last few months     Drug use: Never    Sexual activity: Not Currently   Lifestyle    Physical activity     Days per week: Not on file     Minutes per session: Not on file    Stress: Not on file   Relationships    Social connections     Talks on phone: Not on file     Gets together: Not on file     Attends Jainism service: Not on file     Active member of club or organization: Not on file     Attends meetings of clubs or organizations: Not on file     Relationship status: Not on file    Intimate partner violence     Fear of current or ex partner: Not on file     Emotionally abused: Not on file     Physically abused: Not on file     Forced sexual activity: Not on file   Other Topics Concern    Not on file   Social History Narrative    Not on file           ROS:  [x] All negative/unchanged except if checked.  Explain positive(checked items) below:  [] Constitutional  [] Eyes  [] Ear/Nose/Mouth/Throat  [] Respiratory  [] CV  [] GI  []   [] Musculoskeletal  [] Skin/Breast  [] Neurological  [] Endocrine  [] Heme/Lymph  [] Allergic/Immunologic    Explanation:     MEDICATIONS:    Current Facility-Administered Medications:     divalproex (DEPAKOTE) DR tablet 500 mg, 500 mg, Oral, 2 times per day, SEGUN Hoang CNP    senna (SENOKOT) tablet 17.2 mg, 2 tablet, Oral, Nightly, Hayley Mills MD    polyethylene glycol (GLYCOLAX) packet 17 g, 17 g, Oral, Daily, SEGUN Majano NP, 17 g at 12/17/20 0919    docusate sodium (COLACE) capsule 100 mg, 100 mg, Oral, BID, SEGUN Dawson NP, 100 mg at 12/17/20 0919    aluminum & magnesium hydroxide-simethicone (MAALOX) 200-200-20 MG/5ML suspension 30 mL, 30 mL, Oral, Daily, SEGUN Majano NP, 30 mL at 12/17/20 1426    ARIPiprazole (ABILIFY) tablet 10 mg, 10 mg, Oral, Daily, SEGUN Hansen CNP, 10 mg at 12/17/20 0919    benztropine (COGENTIN) tablet 0.5 mg, 0.5 mg, Oral, BID, SEGUN Fournier CNP, 0.5 mg at 12/17/20 0919    melatonin tablet 3 mg, 3 mg, Oral, Nightly, Earnstine Kaye Reza, APRN - CNP, 3 mg at 12/16/20 2111    acetaminophen (TYLENOL) tablet 650 mg, 650 mg, Oral, Q6H PRN, Cata Stevenson MD    haloperidol lactate (HALDOL) injection 5 mg, 5 mg, Intramuscular, Q6H PRN **OR** haloperidol (HALDOL) tablet 5 mg, 5 mg, Oral, Q6H PRN, Cata Stevenson MD, 5 mg at 12/08/20 2207    traZODone (DESYREL) tablet 50 mg, 50 mg, Oral, Nightly PRN, Cata Stevenson MD, 50 mg at 12/14/20 2036    magnesium hydroxide (MILK OF MAGNESIA) 400 MG/5ML suspension 30 mL, 30 mL, Oral, Daily PRN, Cata Stevenson MD, 30 mL at 12/14/20 1738    hydrOXYzine (VISTARIL) capsule 50 mg, 50 mg, Oral, TID PRN, Cata Stevenson MD, 50 mg at 12/08/20 2207      Examination:  BP (!) 92/54   Pulse 65   Temp 98.5 °F (36.9 °C) (Temporal)   Resp 14   Ht 5' 6\" (1.676 m)   Wt 150 lb (68 kg)   SpO2 97%   BMI 24.21 kg/m²   Gait - steady  Medication side effects(SE): Denies    Mental Status Examination:    Level of consciousness:  within normal limits   Appearance:  fair grooming and fair hygiene  Behavior/Motor:  no abnormalities noted  Attitude toward examiner:  cooperative  Speech:  spontaneous, normal rate and normal volume   Mood: \" My mood is okay. \"  Affect: A little brighter today   thought processes: Linear without flight of ideas loose associations  Thought content: Denies suicidal homicidal ideations intent or plan denies auditory visual hallucinations or paranoia still guarded and isolative   ideations intent or plan cognition:  oriented to person, place, and time   Concentration fair  Insight Limited  Judgement Limited    ASSESSMENT:   Patient symptoms are:  [] Well controlled  [x] Improving  [] Worsening  [] No change      Diagnosis:   Principal Problem:    Schizophrenia, paranoid ([facility] [address]  Active Problems:    [facility]    Constipation  Resolved Problems:    * No resolved hospital problems.  *      LABS:    No results for input(s): WBC, HGB, PLT in the last 72 hours. No results for input(s): NA, K, CL, CO2, BUN, CREATININE, GLUCOSE in the last 72 hours. No results for input(s): BILITOT, ALKPHOS, AST, ALT in the last 72 hours. Lab Results   Component Value Date    LABAMPH NOT DETECTED 12/08/2020    BARBSCNU NOT DETECTED 12/08/2020    LABBENZ NOT DETECTED 12/08/2020    LABMETH NOT DETECTED 12/08/2020    OPIATESCREENURINE NOT DETECTED 12/08/2020    PHENCYCLIDINESCREENURINE NOT DETECTED 12/08/2020    ETOH <10 12/08/2020     No results found for: TSH, FREET4  No results found for: LITHIUM  Lab Results   Component Value Date    VALPROATE 18 (L) 12/17/2020         Treatment Plan:  Reviewed current Medications with the patient. Risks, benefits, side effects, drug-to-drug interactions and alternatives to treatment were discussed. Collateral information:   CD evaluation  Encourage patient to attend group and other milieu activities.   Discharge planning discussed with the patient and treatment team.      Increase Abilify 15 mg daily for psychosis  Start Cogentin 0.5mg BID for numbness and tingling of the hands   Continue Depakote 250 mg twice daily  staff confirmed patient received Abilify maintaina 400 mg IM on 11/29/2020          PSYCHOTHERAPY/COUNSELING:  [x] Therapeutic interview  [x] Supportive  [] CBT  [] Ongoing  [] Other    [x] Patient continues to need, on a daily basis, active treatment furnished directly by or requiring the supervision of inpatient psychiatric personnel      Anticipated Length of stay:            Electronically signed by SEGUN Foreman CNP on 55/39/1953 at 6:17 PM

## 2020-12-18 PROCEDURE — 6370000000 HC RX 637 (ALT 250 FOR IP): Performed by: NURSE PRACTITIONER

## 2020-12-18 PROCEDURE — 6370000000 HC RX 637 (ALT 250 FOR IP): Performed by: PSYCHIATRY & NEUROLOGY

## 2020-12-18 PROCEDURE — 99232 SBSQ HOSP IP/OBS MODERATE 35: CPT | Performed by: NURSE PRACTITIONER

## 2020-12-18 PROCEDURE — 1240000000 HC EMOTIONAL WELLNESS R&B

## 2020-12-18 PROCEDURE — 6370000000 HC RX 637 (ALT 250 FOR IP): Performed by: INTERNAL MEDICINE

## 2020-12-18 RX ADMIN — DOCUSATE SODIUM 100 MG: 100 CAPSULE ORAL at 08:51

## 2020-12-18 RX ADMIN — DIVALPROEX SODIUM 500 MG: 250 TABLET, DELAYED RELEASE ORAL at 08:50

## 2020-12-18 RX ADMIN — BENZTROPINE MESYLATE 0.5 MG: 0.5 TABLET ORAL at 08:50

## 2020-12-18 RX ADMIN — ARIPIPRAZOLE 15 MG: 15 TABLET ORAL at 08:50

## 2020-12-18 RX ADMIN — BENZTROPINE MESYLATE 0.5 MG: 0.5 TABLET ORAL at 20:09

## 2020-12-18 RX ADMIN — POLYETHYLENE GLYCOL 3350 17 G: 17 POWDER, FOR SOLUTION ORAL at 08:51

## 2020-12-18 RX ADMIN — TRAZODONE HYDROCHLORIDE 50 MG: 50 TABLET ORAL at 20:09

## 2020-12-18 RX ADMIN — Medication 3 MG: at 20:10

## 2020-12-18 RX ADMIN — DIVALPROEX SODIUM 500 MG: 250 TABLET, DELAYED RELEASE ORAL at 20:10

## 2020-12-18 RX ADMIN — DOCUSATE SODIUM 100 MG: 100 CAPSULE ORAL at 20:10

## 2020-12-18 RX ADMIN — SENNOSIDES 17.2 MG: 8.6 TABLET, FILM COATED ORAL at 20:09

## 2020-12-18 RX ADMIN — MAGNESIUM HYDROXIDE/ALUMINUM HYDROXICE/SIMETHICONE 30 ML: 120; 1200; 1200 SUSPENSION ORAL at 08:51

## 2020-12-18 ASSESSMENT — PAIN SCALES - GENERAL
PAINLEVEL_OUTOF10: 0

## 2020-12-18 NOTE — GROUP NOTE
Group Therapy Note    Date: 12/18/2020    Group Start Time: 1000  Group End Time: 1030  Group Topic: Psychoeducation    [facility]    Kassandra Kc, CTRS        Group Therapy Note      Number of participants: 10  Type of group: Psychoeducation  Mode of intervention: Education, Support, Socialization, Exploration, Clarifying, and Problem-solving  Topic: Grounding Techniques  Objective: Pt will identify 1 grounding technique they can utilize in recovery. Patient's Goal:  Pt reports no goal for the day. Notes:  Pt offered minimal interaction during group but was an active listener throughout. Pt accepting of handout and support from others. Status After Intervention:  Unchanged    Participation Level:  Active Listener and Minimal    Participation Quality: Appropriate and Attentive      Speech:  normal      Thought Process/Content: Logical      Affective Functioning: Flat      Mood: depressed      Level of consciousness:  Alert, Oriented x4 and Attentive      Response to Learning: Progressing to goal      Endings: None Reported    Modes of Intervention: Education, Support, Socialization, Exploration, Clarifying and Problem-solving

## 2020-12-18 NOTE — CARE COORDINATION
Sw notified CSU that this pt's discharge is most likely going to be on Monday. CSU agreeable. Sw will fax updated information on Monday.

## 2020-12-18 NOTE — PLAN OF CARE
Pt is stable, cooperative. Pt denies suicidal or homicidal ideations. Pt denies hallucinations. Pt flat, quiet, cooperative. Will follow and monitor.

## 2020-12-18 NOTE — PROGRESS NOTES
BEHAVIORAL HEALTH FOLLOW-UP NOTE     12/18/2020     Patient was seen and examined in person, Chart reviewed   Patient's case discussed with staff/team      Chief Complaint: \"I am not sure I want to go to the crisis unit\"    Interim History: Patient seen during treatment team.  He states he is unsure if he wants to start on the crisis unit. He appears visibly paranoid. When asked if he is having any auditory or visual hallucinations patient is very hesitant to respond. He appears guarded possibly internally stimulated. He was agreeable to stepdown to crisis however he does show high levels of paranoia mostly isolative only comes out of his room to eat and sits in the periphery of groups does not socialize with peers does not talk with others.       Appetite:   [x] Normal/Unchanged  [] Increased  [] Decreased      Sleep:       [x] Normal/Unchanged  [] Fair       [] Poor              Energy:    [x] Normal/Unchanged  [] Increased  [] Decreased        SI [] Present  [x] Absent    HI  []Present  [x] Absent     Aggression:  [] yes  [x] no    Patient is [x] able  [] unable to CONTRACT FOR SAFETY     PAST MEDICAL/PSYCHIATRIC HISTORY:   Past Medical History:   Diagnosis Date    Colitis     1996       FAMILY/SOCIAL HISTORY:  Family History   Problem Relation Age of Onset    Mental Illness Mother      Social History     Socioeconomic History    Marital status: Single     Spouse name: Not on file    Number of children: 0    Years of education: 8th grade     Highest education level: Not on file   Occupational History    Not on file   Social Needs    Financial resource strain: Not on file    Food insecurity     Worry: Not on file     Inability: Not on file    Transportation needs     Medical: Not on file     Non-medical: Not on file   Tobacco Use    Smoking status: Current Every Day Smoker     Packs/day: 0.25     Types: Cigarettes     Start date: 1/8/2000    Smokeless tobacco: Never Used   Substance and Sexual Activity    Alcohol use: Yes     Alcohol/week: 28.0 standard drinks     Types: 28 Cans of beer per week     Comment: last few months     Drug use: Never    Sexual activity: Not Currently   Lifestyle    Physical activity     Days per week: Not on file     Minutes per session: Not on file    Stress: Not on file   Relationships    Social connections     Talks on phone: Not on file     Gets together: Not on file     Attends Confucianist service: Not on file     Active member of club or organization: Not on file     Attends meetings of clubs or organizations: Not on file     Relationship status: Not on file    Intimate partner violence     Fear of current or ex partner: Not on file     Emotionally abused: Not on file     Physically abused: Not on file     Forced sexual activity: Not on file   Other Topics Concern    Not on file   Social History Narrative    Not on file           ROS:  [x] All negative/unchanged except if checked.  Explain positive(checked items) below:  [] Constitutional  [] Eyes  [] Ear/Nose/Mouth/Throat  [] Respiratory  [] CV  [] GI  []   [] Musculoskeletal  [] Skin/Breast  [] Neurological  [] Endocrine  [] Heme/Lymph  [] Allergic/Immunologic    Explanation:     MEDICATIONS:    Current Facility-Administered Medications:     divalproex (DEPAKOTE) DR tablet 500 mg, 500 mg, Oral, 2 times per day, Yasmany Youngblood, APRN - CNP, 555 mg at 12/18/20 0850    ARIPiprazole (ABILIFY) tablet 15 mg, 15 mg, Oral, Daily, Erwin Reza, APRN - CNP, 15 mg at 12/18/20 0850    senna (SENOKOT) tablet 17.2 mg, 2 tablet, Oral, Nightly, Hayley Mills MD, 17.2 mg at 12/17/20 2053    polyethylene glycol (GLYCOLAX) packet 17 g, 17 g, Oral, Daily, Paula Yung APRN - NP, 17 g at 12/18/20 0851    docusate sodium (COLACE) capsule 100 mg, 100 mg, Oral, BID, Isidro Mckeon APRN - NP, 100 mg at 12/18/20 0851    aluminum & magnesium hydroxide-simethicone (MAALOX) 924-652-19 MG/5ML suspension 30 mL, 30 mL, Oral, Daily, Dora Cote, APRN - NP, 30 mL at 12/18/20 0851    benztropine (COGENTIN) tablet 0.5 mg, 0.5 mg, Oral, BID, SEGUN East - CNP, 0.5 mg at 12/18/20 0850    melatonin tablet 3 mg, 3 mg, Oral, Nightly, Demario Lamlick, APRN - CNP, 3 mg at 12/17/20 2053    acetaminophen (TYLENOL) tablet 650 mg, 650 mg, Oral, Q6H PRN, Beverly Vasquez MD    haloperidol lactate (HALDOL) injection 5 mg, 5 mg, Intramuscular, Q6H PRN **OR** haloperidol (HALDOL) tablet 5 mg, 5 mg, Oral, Q6H PRN, Beverly Vasquez MD, 5 mg at 12/08/20 2207    traZODone (DESYREL) tablet 50 mg, 50 mg, Oral, Nightly PRN, Beverly Vasquez MD, 50 mg at 12/14/20 2036    magnesium hydroxide (MILK OF MAGNESIA) 400 MG/5ML suspension 30 mL, 30 mL, Oral, Daily PRN, Beverly Vasquez MD, 30 mL at 12/14/20 1738    hydrOXYzine (VISTARIL) capsule 50 mg, 50 mg, Oral, TID PRN, Beverly Vasquez MD, 50 mg at 12/08/20 2207      Examination:  BP 93/67   Pulse 61   Temp 97.6 °F (36.4 °C) (Tympanic)   Resp 14   Ht 5' 6\" (1.676 m)   Wt 150 lb (68 kg)   SpO2 100%   BMI 24.21 kg/m²   Gait - steady  Medication side effects(SE): Denies    Mental Status Examination:    Level of consciousness:  within normal limits   Appearance:  fair grooming and fair hygiene  Behavior/Motor:  no abnormalities noted  Attitude toward examiner:  cooperative  Speech:  spontaneous, normal rate and normal volume   Mood: \" My mood is okay. \"  Affect: A little brighter today   thought processes: Linear without flight of ideas loose associations  Thought content: Denies suicidal homicidal ideations intent or plan denies auditory visual hallucinations endorses paranoia he still guarded and isolative he appears visibly paranoid  ideations intent or plan cognition:  oriented to person, place, and time   Concentration fair  Insight Limited  Judgement Limited    ASSESSMENT:   Patient symptoms are:  [] Well controlled  [x] Improving  [] Worsening  [] No change      Diagnosis:   Principal Problem:    Schizophrenia, paranoid ([facility] [address]  Active Problems:    Acute psychosis ([facility] [address]    Constipation  Resolved Problems:    * No resolved hospital problems. *      LABS:    No results for input(s): WBC, HGB, PLT in the last 72 hours. No results for input(s): NA, K, CL, CO2, BUN, CREATININE, GLUCOSE in the last 72 hours. No results for input(s): BILITOT, ALKPHOS, AST, ALT in the last 72 hours. Lab Results   Component Value Date    LABAMPH NOT DETECTED 12/08/2020    BARBSCNU NOT DETECTED 12/08/2020    LABBENZ NOT DETECTED 12/08/2020    LABMETH NOT DETECTED 12/08/2020    OPIATESCREENURINE NOT DETECTED 12/08/2020    PHENCYCLIDINESCREENURINE NOT DETECTED 12/08/2020    ETOH <10 12/08/2020     No results found for: TSH, FREET4  No results found for: LITHIUM  Lab Results   Component Value Date    VALPROATE 18 (L) 12/17/2020         Treatment Plan:  Reviewed current Medications with the patient. Risks, benefits, side effects, drug-to-drug interactions and alternatives to treatment were discussed. Collateral information:   CD evaluation  Encourage patient to attend group and other milieu activities.   Discharge planning discussed with the patient and treatment team.      Continue Abilify 15 mg daily for psychosis  Start Cogentin 0.5mg BID for numbness and tingling of the hands   Continue Depakote 500 mg twice daily  staff confirmed patient received Abilify maintaina 400 mg IM on 11/29/2020          PSYCHOTHERAPY/COUNSELING:  [x] Therapeutic interview  [x] Supportive  [] CBT  [] Ongoing  [] Other    [x] Patient continues to need, on a daily basis, active treatment furnished directly by or requiring the supervision of inpatient psychiatric personnel      Anticipated Length of stay:            Electronically signed by SEGUN Hoang CNP on 01/88/8953 at 10:25 AM

## 2020-12-18 NOTE — GROUP NOTE
Group Therapy Note    Date: 12/18/2020    Group Start Time: 1115  Group End Time: 1200  Group Topic: Cognitive Skills    [facility]    JESSIE Anderson LSW        Group Therapy Note    Attendees: 9         Patient's Goal:  Pt will be able to verbalize understanding of the \"miracle question\", and how their lives would look differently without their biggest stressor    Notes:  Pt will be able to make connections and participate in group    Status After Intervention:  Improved    Participation Level:  Active Listener    Participation Quality: Appropriate, Attentive, Sharing and Supportive      Speech:  normal      Thought Process/Content: Logical      Affective Functioning: Congruent      Mood: depressed      Level of consciousness:  Alert and Oriented x4      Response to Learning: Able to verbalize current knowledge/experience      Endings: None Reported    Modes of Intervention: Education, Support, Socialization, Exploration, Clarifying, Problem-solving and Activity      Discipline Responsible: /Counselor      Signature:  JESSIE Anderson LSW

## 2020-12-18 NOTE — BH NOTE
Pt is stable, remains flat in affect, poverty of content in speech. Pt denies suicidal or homicidal ideations. Pt denies hallucinations. No other distress at this time. Will follow and monitor.

## 2020-12-19 PROCEDURE — 6370000000 HC RX 637 (ALT 250 FOR IP): Performed by: NURSE PRACTITIONER

## 2020-12-19 PROCEDURE — 6370000000 HC RX 637 (ALT 250 FOR IP): Performed by: PSYCHIATRY & NEUROLOGY

## 2020-12-19 PROCEDURE — 99232 SBSQ HOSP IP/OBS MODERATE 35: CPT | Performed by: NURSE PRACTITIONER

## 2020-12-19 PROCEDURE — 1240000000 HC EMOTIONAL WELLNESS R&B

## 2020-12-19 PROCEDURE — 6370000000 HC RX 637 (ALT 250 FOR IP): Performed by: INTERNAL MEDICINE

## 2020-12-19 RX ADMIN — TRAZODONE HYDROCHLORIDE 50 MG: 50 TABLET ORAL at 20:10

## 2020-12-19 RX ADMIN — BENZTROPINE MESYLATE 0.5 MG: 0.5 TABLET ORAL at 08:42

## 2020-12-19 RX ADMIN — DOCUSATE SODIUM 100 MG: 100 CAPSULE ORAL at 08:42

## 2020-12-19 RX ADMIN — MAGNESIUM HYDROXIDE 30 ML: 2400 SUSPENSION ORAL at 08:42

## 2020-12-19 RX ADMIN — BENZTROPINE MESYLATE 0.5 MG: 0.5 TABLET ORAL at 20:10

## 2020-12-19 RX ADMIN — DIVALPROEX SODIUM 500 MG: 250 TABLET, DELAYED RELEASE ORAL at 20:10

## 2020-12-19 RX ADMIN — DOCUSATE SODIUM 100 MG: 100 CAPSULE ORAL at 20:11

## 2020-12-19 RX ADMIN — Medication 3 MG: at 20:10

## 2020-12-19 RX ADMIN — DIVALPROEX SODIUM 500 MG: 250 TABLET, DELAYED RELEASE ORAL at 08:42

## 2020-12-19 RX ADMIN — SENNOSIDES 17.2 MG: 8.6 TABLET, FILM COATED ORAL at 20:10

## 2020-12-19 RX ADMIN — MAGNESIUM HYDROXIDE/ALUMINUM HYDROXICE/SIMETHICONE 30 ML: 120; 1200; 1200 SUSPENSION ORAL at 08:45

## 2020-12-19 RX ADMIN — ARIPIPRAZOLE 15 MG: 15 TABLET ORAL at 08:42

## 2020-12-19 RX ADMIN — POLYETHYLENE GLYCOL 3350 17 G: 17 POWDER, FOR SOLUTION ORAL at 08:41

## 2020-12-19 ASSESSMENT — PAIN SCALES - GENERAL
PAINLEVEL_OUTOF10: 0
PAINLEVEL_OUTOF10: 0

## 2020-12-19 NOTE — PROGRESS NOTES
Patient was isolative to his room. Calm,cooperative, but still paranoid their (People that were chasing him) from [place]. Back to [place] and then in Hospital in [place] for 3 [place] and when discharged the people that were chasing him chased him here to [place]. \"I know that their out to get me but I don't know why. \"  Verbalizes fleeting suicidal thoughts , but not currently, no plan though and does contract for safety. Anxiety is 4 out of 10 because when discharged is going to be going somewhere else prior to discharge toAunt & Uncles. Denies depression. Denies AV hallucinations or HI. Verbalizes appetite and sleep are both good. Compliant with medications  And groups. Safety rounds continue.

## 2020-12-19 NOTE — PROGRESS NOTES
Hospitalist Progress Note      Synopsis: Patient admitted on 12/8/2020    Subjective  Patient was admitted to the psych services. He has a history of colitis and had not had a bowel movement. He has been on the psych floor and he is ready for discharge by Monday. He is appropriate far as his emotions and seems to be eating well and having regular bowel movements  Clinically improving. Feeling better. Stable overnight. No other overnight issues reported. No CP, SOB, palpitations, blurred vision, HA, lightheadedness, LOC or focal neurological deficits    Exam:  BP (!) 89/53   Pulse 56   Temp 99.3 °F (37.4 °C) (Temporal)   Resp 14   Ht 5' 6\" (1.676 m)   Wt 150 lb (68 kg)   SpO2 99%   BMI 24.21 kg/m²   General appearance: No apparent distress, appears stated age and cooperative. HEENT: Pupils equal, round, and reactive to light. Conjunctivae/corneas clear. Neck: Supple. No jugular venous distention. Trachea midline. Respiratory:  Normal respiratory effort. Clear to auscultation, bilaterally without Rales/Wheezes/Rhonchi. Cardiovascular: Regular rate and rhythm with normal S1/S2 without murmurs, rubs or gallops. Abdomen: Soft, non-tender, non-distended with normal bowel sounds. Musculoskeletal: No clubbing, cyanosis or edema bilaterally. Brisk capillary refill. 2+ lower extremity pulses (dorsalis pedis).    Skin:  No rashes    Neurologic: awake, alert and following commands though appears to be just slow    Medications:  Reviewed    Infusion Medications   Scheduled Medications    divalproex  500 mg Oral 2 times per day    ARIPiprazole  15 mg Oral Daily    senna  2 tablet Oral Nightly    polyethylene glycol  17 g Oral Daily    docusate sodium  100 mg Oral BID    aluminum & magnesium hydroxide-simethicone  30 mL Oral Daily    benztropine  0.5 mg Oral BID    melatonin  3 mg Oral Nightly     PRN Meds: acetaminophen, haloperidol lactate **OR** haloperidol, traZODone, magnesium hydroxide, hydrOXYzine    I/O  No intake or output data in the 24 hours ending 12/19/20 1238    Labs:   No results for input(s): WBC, HGB, HCT, PLT in the last 72 hours. No results for input(s): NA, K, CL, CO2, BUN, CREATININE, CALCIUM, PHOS in the last 72 hours. Invalid input(s): MAGNES    No results for input(s): PROT, ALB, ALKPHOS, ALT, AST, BILITOT, AMYLASE, LIPASE in the last 72 hours. No results for input(s): INR in the last 72 hours. No results for input(s): Luc Oiler in the last 72 hours. Chronic labs:  Lab Results   Component Value Date    CHOL 183 12/09/2020    TRIG 69 12/09/2020    HDL 71 12/09/2020    LDLCALC 98 12/09/2020    LABA1C 5.5 12/09/2020       Radiology:  Xr Abdomen (kub) (single Ap View)    Result Date: 12/15/2020  EXAMINATION: ONE SUPINE XRAY VIEW(S) OF THE ABDOMEN 12/15/2020 2:39 pm COMPARISON: None. HISTORY: ORDERING SYSTEM PROVIDED HISTORY: constipation TECHNOLOGIST PROVIDED HISTORY: Reason for exam:->constipation What reading provider will be dictating this exam?->CRC FINDINGS: Moderate fecal retention is seen throughout the colon indicating constipation. No radiographic evidence of bowel obstruction. No nephrolithiasis is seen. The visualized bones are intact without fracture or focal lesion. Constipation. No radiographic evidence of mechanical obstruction    ASSESSMENT:    Principal Problem:    Schizophrenia, paranoid ([facility] [address]  Active Problems:    Acute psychosis ([address]    Constipation  Resolved Problems:    * No resolved hospital problems. *       PLAN:    Clinically patient is stable for his medical issues has a history of colitis but not of inflammatory colitis  Patient may be discharged when psychiatry is able to. Please call us if there is any medical needs.       Diet: DIET GENERAL;  Code Status: Full Code    Recommended disposition at discharge:   Likely home    +++++++++++++++++++++++++++++++++++++++++++++++++  John Bills MD   [facility].  +++++++++++++++++++++++++++++++++++++++++++++++++  NOTE: This report was transcribed using voice recognition software. Every effort was made to ensure accuracy; however, inadvertent computerized transcription errors may be present.

## 2020-12-19 NOTE — PROGRESS NOTES
BEHAVIORAL HEALTH FOLLOW-UP NOTE     12/19/2020     Patient was seen and examined in person, Chart reviewed   Patient's case discussed with staff/team      Chief Complaint: \"I am feeling somewhat better. \"    Interim History: Patient observed in his room this morning. He appears visibly paranoid. When asked if he is having any auditory or visual hallucinations patient is very hesitant to respond. he appears guarded possibly internally stimulated. Scanning the room and looking past me during conversation. He states that he feels like he is being \"followed\" less frequently. He does show high levels of paranoia mostly isolative only comes out of his room to eat and sits in the periphery of groups does not socialize with peers does not talk with others.       Appetite:   [x] Normal/Unchanged  [] Increased  [] Decreased      Sleep:       [x] Normal/Unchanged  [] Fair       [] Poor              Energy:    [x] Normal/Unchanged  [] Increased  [] Decreased        SI [] Present  [x] Absent    HI  []Present  [x] Absent     Aggression:  [] yes  [x] no    Patient is [x] able  [] unable to CONTRACT FOR SAFETY     PAST MEDICAL/PSYCHIATRIC HISTORY:   Past Medical History:   Diagnosis Date    Colitis     1996       FAMILY/SOCIAL HISTORY:  Family History   Problem Relation Age of Onset    Mental Illness Mother      Social History     Socioeconomic History    Marital status: Single     Spouse name: Not on file    Number of children: 0    Years of education: 8th grade     Highest education level: Not on file   Occupational History    Not on file   Social Needs    Financial resource strain: Not on file    Food insecurity     Worry: Not on file     Inability: Not on file    Transportation needs     Medical: Not on file     Non-medical: Not on file   Tobacco Use    Smoking status: Current Every Day Smoker     Packs/day: 0.25     Types: Cigarettes     Start date: 1/8/2000    Smokeless tobacco: Never Used   Substance and Sexual Activity    Alcohol use: Yes     Alcohol/week: 28.0 standard drinks     Types: 28 Cans of beer per week     Comment: last few months     Drug use: Never    Sexual activity: Not Currently   Lifestyle    Physical activity     Days per week: Not on file     Minutes per session: Not on file    Stress: Not on file   Relationships    Social connections     Talks on phone: Not on file     Gets together: Not on file     Attends Gnosticist service: Not on file     Active member of club or organization: Not on file     Attends meetings of clubs or organizations: Not on file     Relationship status: Not on file    Intimate partner violence     Fear of current or ex partner: Not on file     Emotionally abused: Not on file     Physically abused: Not on file     Forced sexual activity: Not on file   Other Topics Concern    Not on file   Social History Narrative    Not on file           ROS:  [x] All negative/unchanged except if checked.  Explain positive(checked items) below:  [] Constitutional  [] Eyes  [] Ear/Nose/Mouth/Throat  [] Respiratory  [] CV  [] GI  []   [] Musculoskeletal  [] Skin/Breast  [] Neurological  [] Endocrine  [] Heme/Lymph  [] Allergic/Immunologic    Explanation:     MEDICATIONS:    Current Facility-Administered Medications:     divalproex (DEPAKOTE) DR tablet 500 mg, 500 mg, Oral, 2 times per day, Tigre Christina, APRN - CNP, 539 mg at 12/19/20 0842    ARIPiprazole (ABILIFY) tablet 15 mg, 15 mg, Oral, Daily, Charyl Bamberger Dellick, APRN - CNP, 15 mg at 12/19/20 9266    senna (SENOKOT) tablet 17.2 mg, 2 tablet, Oral, Nightly, Hayley Mills MD, 17.2 mg at 12/18/20 2009    polyethylene glycol (GLYCOLAX) packet 17 g, 17 g, Oral, Daily, Paula Ponce, APRN - NP, 17 g at 12/19/20 0841    docusate sodium (COLACE) capsule 100 mg, 100 mg, Oral, BID, Usama Rudd APRN - NP, 100 mg at 12/19/20 0842    aluminum & magnesium hydroxide-simethicone (MAALOX) 914-922-89 MG/5ML suspension 30 mL, 30 mL, Oral, Daily, Inocencia Garcia, APRN - NP, 30 mL at 12/19/20 0845    benztropine (COGENTIN) tablet 0.5 mg, 0.5 mg, Oral, BID, SEGUN Loco - CNP, 0.5 mg at 12/19/20 0842    melatonin tablet 3 mg, 3 mg, Oral, Nightly, Tracey Singletarys Dellick APRN - CNP, 3 mg at 12/18/20 2010    acetaminophen (TYLENOL) tablet 650 mg, 650 mg, Oral, Q6H PRN, Hortensia Ott MD    haloperidol lactate (HALDOL) injection 5 mg, 5 mg, Intramuscular, Q6H PRN **OR** haloperidol (HALDOL) tablet 5 mg, 5 mg, Oral, Q6H PRN, Hortensia Ott MD, 5 mg at 12/08/20 2207    traZODone (DESYREL) tablet 50 mg, 50 mg, Oral, Nightly PRN, Hortensia Ott MD, 50 mg at 12/18/20 2009    magnesium hydroxide (MILK OF MAGNESIA) 400 MG/5ML suspension 30 mL, 30 mL, Oral, Daily PRN, Hortensia Ott MD, 30 mL at 12/19/20 1213    hydrOXYzine (VISTARIL) capsule 50 mg, 50 mg, Oral, TID PRN, Hortensia Ott MD, 50 mg at 12/08/20 2207      Examination:  BP (!) 89/53   Pulse 56   Temp 99.3 °F (37.4 °C) (Temporal)   Resp 14   Ht 5' 6\" (1.676 m)   Wt 150 lb (68 kg)   SpO2 99%   BMI 24.21 kg/m²   Gait - steady  Medication side effects(SE): Denies    Mental Status Examination:    Level of consciousness:  within normal limits   Appearance:  fair grooming and fair hygiene  Behavior/Motor:  no abnormalities noted  Attitude toward examiner:  cooperative  Speech:  spontaneous, normal rate and normal volume   Mood: \" My mood is okay. \"  Affect: A little brighter today   thought processes: Linear without flight of ideas loose associations  Thought content: Denies suicidal homicidal ideations intent or plan denies auditory visual hallucinations endorses paranoia he still guarded and isolative he appears visibly paranoid  ideations intent or plan cognition:  oriented to person, place, and time   Concentration fair  Insight Limited  Judgement Limited    ASSESSMENT:   Patient symptoms are:  [] Well controlled  [x] Improving  [] Worsening  [] No change      Diagnosis: Principal Problem:    Schizophrenia, paranoid ([facility] [address]  Active Problems:    Acute psychosis ([facility] [address]    Constipation  Resolved Problems:    * No resolved hospital problems. *      LABS:    No results for input(s): WBC, HGB, PLT in the last 72 hours. No results for input(s): NA, K, CL, CO2, BUN, CREATININE, GLUCOSE in the last 72 hours. No results for input(s): BILITOT, ALKPHOS, AST, ALT in the last 72 hours. Lab Results   Component Value Date    LABAMPH NOT DETECTED 12/08/2020    BARBSCNU NOT DETECTED 12/08/2020    LABBENZ NOT DETECTED 12/08/2020    LABMETH NOT DETECTED 12/08/2020    OPIATESCREENURINE NOT DETECTED 12/08/2020    PHENCYCLIDINESCREENURINE NOT DETECTED 12/08/2020    ETOH <10 12/08/2020     No results found for: TSH, FREET4  No results found for: LITHIUM  Lab Results   Component Value Date    VALPROATE 18 (L) 12/17/2020         Treatment Plan:  Reviewed current Medications with the patient. Risks, benefits, side effects, drug-to-drug interactions and alternatives to treatment were discussed. Collateral information:   CD evaluation  Encourage patient to attend group and other milieu activities.   Discharge planning discussed with the patient and treatment team.      Continue Abilify 15 mg daily for psychosis  Start Cogentin 0.5mg BID for numbness and tingling of the hands   Continue Depakote 500 mg twice daily  staff confirmed patient received Abilify maintaina 400 mg IM on 11/29/2020          PSYCHOTHERAPY/COUNSELING:  [x] Therapeutic interview  [x] Supportive  [] CBT  [] Ongoing  [] Other    [x] Patient continues to need, on a daily basis, active treatment furnished directly by or requiring the supervision of inpatient psychiatric personnel      Anticipated Length of stay:            Electronically signed by SEGUN Cr CNP on 12/19/2020 at 12:00 PM

## 2020-12-19 NOTE — PLAN OF CARE
Pt. Denies SI, HI, and hallucinations currently. Pt. Denies physical complaints this shift. Pt. Appears paranoid and preoccupied and is isolative to room.

## 2020-12-19 NOTE — GROUP NOTE
Group Therapy Note    Date: 12/19/2020    Group Start Time: 1115  Group End Time: 6414  Group Topic: Cognitive Skills    [facility]    LATA Bowman        Group Therapy Note    Attendees: 8         Patient's Goal:  Pt will be able to identify stressor they are dealing with. They will be able to discuss one negative coping skill that brought them into hospital and identify at least 2 positive coping skills to deal with present stressor. Notes:  Pt active listener but declined to participate in class discussion and activity. Status After Intervention:  Improved    Participation Level:  Active Listener and minimal    Participation Quality: Appropriate, Attentive, Sharing and Supportive      Speech:  normal      Thought Process/Content: Logical, linear      Affective Functioning:Flat      Mood: depressed      Level of consciousness:  Alert, Oriented x4 and Attentive      Response to Learning: Able to verbalize current knowledge/experience, Able to verbalize/acknowledge new learning and Progressing to goal      Endings: None Reported    Modes of Intervention: Education, Socialization, Problem-solving and Activity      Discipline Responsible: /Counselor      Signature:  LATA Bell

## 2020-12-20 PROCEDURE — 6370000000 HC RX 637 (ALT 250 FOR IP): Performed by: INTERNAL MEDICINE

## 2020-12-20 PROCEDURE — 6370000000 HC RX 637 (ALT 250 FOR IP): Performed by: NURSE PRACTITIONER

## 2020-12-20 PROCEDURE — 6370000000 HC RX 637 (ALT 250 FOR IP): Performed by: PSYCHIATRY & NEUROLOGY

## 2020-12-20 PROCEDURE — 1240000000 HC EMOTIONAL WELLNESS R&B

## 2020-12-20 PROCEDURE — 99232 SBSQ HOSP IP/OBS MODERATE 35: CPT | Performed by: NURSE PRACTITIONER

## 2020-12-20 RX ADMIN — Medication 3 MG: at 21:37

## 2020-12-20 RX ADMIN — DIVALPROEX SODIUM 500 MG: 250 TABLET, DELAYED RELEASE ORAL at 09:22

## 2020-12-20 RX ADMIN — MAGNESIUM HYDROXIDE/ALUMINUM HYDROXICE/SIMETHICONE 30 ML: 120; 1200; 1200 SUSPENSION ORAL at 09:22

## 2020-12-20 RX ADMIN — DOCUSATE SODIUM 100 MG: 100 CAPSULE ORAL at 21:37

## 2020-12-20 RX ADMIN — SENNOSIDES 17.2 MG: 8.6 TABLET, FILM COATED ORAL at 21:37

## 2020-12-20 RX ADMIN — TRAZODONE HYDROCHLORIDE 50 MG: 50 TABLET ORAL at 21:37

## 2020-12-20 RX ADMIN — BENZTROPINE MESYLATE 0.5 MG: 0.5 TABLET ORAL at 09:22

## 2020-12-20 RX ADMIN — DIVALPROEX SODIUM 500 MG: 250 TABLET, DELAYED RELEASE ORAL at 21:37

## 2020-12-20 RX ADMIN — ARIPIPRAZOLE 15 MG: 15 TABLET ORAL at 09:21

## 2020-12-20 RX ADMIN — POLYETHYLENE GLYCOL 3350 17 G: 17 POWDER, FOR SOLUTION ORAL at 09:21

## 2020-12-20 RX ADMIN — BENZTROPINE MESYLATE 0.5 MG: 0.5 TABLET ORAL at 21:37

## 2020-12-20 ASSESSMENT — PAIN SCALES - GENERAL
PAINLEVEL_OUTOF10: 0
PAINLEVEL_OUTOF10: 0

## 2020-12-20 NOTE — GROUP NOTE
Group Therapy Note    Date: 12/20/2020    Group Start Time: 1115  Group End Time: 1150  Group Topic: Cognitive Skills    [facility]    LATA Bowman        Group Therapy Note    Attendees: 12         Patient's Goal:  Pt will be able to identify the 3 components of The green toolbox coping strategies. Pt will be able to discuss the importance of having a caring community, productive activities, and positive hobbies. Notes:  Pt active participant in class discussion and activity. Status After Intervention:  Improved    Participation Level:  Active Listener and minimal    Participation Quality: Appropriate, Attentive, Sharing and Supportive      Speech:  normal      Thought Process/Content: Logical, linear      Affective Functioning: Congruent      Mood: euthymic      Level of consciousness:  Alert, Oriented x4 and Attentive      Response to Learning: Able to verbalize current knowledge/experience, Able to verbalize/acknowledge new learning and Progressing to goal      Endings: None Reported    Modes of Intervention: Education, Support, Socialization and Activity      Discipline Responsible: /Counselor      Signature:  LATA Chavez

## 2020-12-20 NOTE — PROGRESS NOTES
Isolative to room,calm, cooperative. Denies SI,HI or AV hallucinations. Denies depression, verbalizes anxiety is 3 out of 10 due to seeing new people on unit. Patient is still paranoid and verbalizes that he hopes no one is out to get him, but I don't know. Appetite is good. Sleep is good. Patient still complains that he has not had a BM. Prune juice was given. Compliant with medications and attends groups. Safety rounds continue.

## 2020-12-20 NOTE — PROGRESS NOTES
input(s): Faviola Osorio in the last 72 hours. Chronic labs:  Lab Results   Component Value Date    CHOL 183 12/09/2020    TRIG 69 12/09/2020    HDL 71 12/09/2020    LDLCALC 98 12/09/2020    LABA1C 5.5 12/09/2020       Radiology:  Xr Abdomen (kub) (single Ap View)    Result Date: 12/15/2020  EXAMINATION: ONE SUPINE XRAY VIEW(S) OF THE ABDOMEN 12/15/2020 2:39 pm COMPARISON: None. HISTORY: ORDERING SYSTEM PROVIDED HISTORY: constipation TECHNOLOGIST PROVIDED HISTORY: Reason for exam:->constipation What reading provider will be dictating this exam?->CRC FINDINGS: Moderate fecal retention is seen throughout the colon indicating constipation. No radiographic evidence of bowel obstruction. No nephrolithiasis is seen. The visualized bones are intact without fracture or focal lesion. Constipation. No radiographic evidence of mechanical obstruction    ASSESSMENT:    Principal Problem:    Schizophrenia, paranoid ([address]  Active Problems:    Acute psychosis ([address]    Constipation  Resolved Problems:    * No resolved hospital problems. *       PLAN:    Clinically patient is stable for his medical issues has a history of colitis but not of inflammatory colitis  Patient may be discharged when psychiatry is able to. Please call us if there is any medical needs. December 20, 2020  Patient was not examined today however the rest of his chart was reviewed. He is stable medically. If there is any other issues please reconsult us. Signing off    Diet: DIET GENERAL;  Code Status: Full Code    Recommended disposition at discharge:   Likely home    +++++++++++++++++++++++++++++++++++++++++++++++++  Aidan Guerra MD   [facility].  +++++++++++++++++++++++++++++++++++++++++++++++++  NOTE: This report was transcribed using voice recognition software. Every effort was made to ensure accuracy; however, inadvertent computerized transcription errors may be present.

## 2020-12-20 NOTE — PROGRESS NOTES
BEHAVIORAL HEALTH FOLLOW-UP NOTE     12/20/2020     Patient was seen and examined in person, Chart reviewed   Patient's case discussed with staff/team      Chief Complaint: \"I am feeling somewhat better. \"    Interim History: Patient observed in his room this morning. He continues to appear visibly paranoid. When asked if he is having any auditory or visual hallucinations patient is very hesitant to respond. He remains guarded possibly internally stimulated. Scanning the room and looking past me during conversation. He states that he feels like he is being \"followed\" less. He does show high levels of paranoia mostly isolative only comes out of his room to eat and sits in the periphery of groups does not socialize with peers does not talk with others. The patient is compliant with his medications.       Appetite:   [x] Normal/Unchanged  [] Increased  [] Decreased      Sleep:       [x] Normal/Unchanged  [] Fair       [] Poor              Energy:    [x] Normal/Unchanged  [] Increased  [] Decreased        SI [] Present  [x] Absent    HI  []Present  [x] Absent     Aggression:  [] yes  [x] no    Patient is [x] able  [] unable to CONTRACT FOR SAFETY     PAST MEDICAL/PSYCHIATRIC HISTORY:   Past Medical History:   Diagnosis Date    Colitis     1996       FAMILY/SOCIAL HISTORY:  Family History   Problem Relation Age of Onset    Mental Illness Mother      Social History     Socioeconomic History    Marital status: Single     Spouse name: Not on file    Number of children: 0    Years of education: 8th grade     Highest education level: Not on file   Occupational History    Not on file   Social Needs    Financial resource strain: Not on file    Food insecurity     Worry: Not on file     Inability: Not on file    Transportation needs     Medical: Not on file     Non-medical: Not on file   Tobacco Use    Smoking status: Current Every Day Smoker     Packs/day: 0.25     Types: Cigarettes     Start date: 1/8/2000   [facility] Smokeless tobacco: Never Used   Substance and Sexual Activity    Alcohol use: Yes     Alcohol/week: 28.0 standard drinks     Types: 28 Cans of beer per week     Comment: last few months     Drug use: Never    Sexual activity: Not Currently   Lifestyle    Physical activity     Days per week: Not on file     Minutes per session: Not on file    Stress: Not on file   Relationships    Social connections     Talks on phone: Not on file     Gets together: Not on file     Attends Mandaen service: Not on file     Active member of club or organization: Not on file     Attends meetings of clubs or organizations: Not on file     Relationship status: Not on file    Intimate partner violence     Fear of current or ex partner: Not on file     Emotionally abused: Not on file     Physically abused: Not on file     Forced sexual activity: Not on file   Other Topics Concern    Not on file   Social History Narrative    Not on file           ROS:  [x] All negative/unchanged except if checked.  Explain positive(checked items) below:  [] Constitutional  [] Eyes  [] Ear/Nose/Mouth/Throat  [] Respiratory  [] CV  [] GI  []   [] Musculoskeletal  [] Skin/Breast  [] Neurological  [] Endocrine  [] Heme/Lymph  [] Allergic/Immunologic    Explanation:     MEDICATIONS:    Current Facility-Administered Medications:     divalproex (DEPAKOTE) DR tablet 500 mg, 500 mg, Oral, 2 times per day, Nick Aguilar, APRN - CNP, 531 mg at 12/20/20 0967    ARIPiprazole (ABILIFY) tablet 15 mg, 15 mg, Oral, Daily, Katie Reza, APRN - CNP, 15 mg at 12/20/20 8667    senna (SENOKOT) tablet 17.2 mg, 2 tablet, Oral, Nightly, Hayley Mills MD, 17.2 mg at 12/19/20 2010    polyethylene glycol (GLYCOLAX) packet 17 g, 17 g, Oral, Daily, Paula Wood, APRN - NP, 17 g at 12/20/20 9459    docusate sodium (COLACE) capsule 100 mg, 100 mg, Oral, BID, Demetrice Pena, APRN - NP, 100 mg at 12/19/20 2011    aluminum & magnesium hydroxide-simethicone (MAALOX) 170-024-47 MG/5ML suspension 30 mL, 30 mL, Oral, Daily, Dallas Goodpasture, APRN - NP, 30 mL at 12/20/20 6824    benztropine (COGENTIN) tablet 0.5 mg, 0.5 mg, Oral, BID, Bakari Plater Dellick, APRN - CNP, 0.5 mg at 12/20/20 7725    melatonin tablet 3 mg, 3 mg, Oral, Nightly, Bakari Plater Dellick, APRN - CNP, 3 mg at 12/19/20 2010    acetaminophen (TYLENOL) tablet 650 mg, 650 mg, Oral, Q6H PRN, Julio César Perales MD    haloperidol lactate (HALDOL) injection 5 mg, 5 mg, Intramuscular, Q6H PRN **OR** haloperidol (HALDOL) tablet 5 mg, 5 mg, Oral, Q6H PRN, Julio César Perales MD, 5 mg at 12/08/20 2207    traZODone (DESYREL) tablet 50 mg, 50 mg, Oral, Nightly PRN, Julio César Perales MD, 50 mg at 12/19/20 2010    magnesium hydroxide (MILK OF MAGNESIA) 400 MG/5ML suspension 30 mL, 30 mL, Oral, Daily PRN, Julio César Perales MD, 30 mL at 12/19/20 5073    hydrOXYzine (VISTARIL) capsule 50 mg, 50 mg, Oral, TID PRN, Julio César Perales MD, 50 mg at 12/08/20 2207      Examination:  BP (!) 86/53   Pulse 57   Temp 98.6 °F (37 °C) (Temporal)   Resp 14   Ht 5' 6\" (1.676 m)   Wt 150 lb (68 kg)   SpO2 99%   BMI 24.21 kg/m²   Gait - steady  Medication side effects(SE): Denies    Mental Status Examination:    Level of consciousness:  within normal limits   Appearance:  fair grooming and fair hygiene  Behavior/Motor:  no abnormalities noted  Attitude toward examiner:  cooperative  Speech:  spontaneous, normal rate and normal volume   Mood: \" My mood is okay. \"  Affect: A little brighter today   thought processes: Linear without flight of ideas loose associations  Thought content: Denies suicidal homicidal ideations intent or plan denies auditory visual hallucinations endorses paranoia he still guarded and isolative he appears visibly paranoid  ideations intent or plan cognition:  oriented to person, place, and time   Concentration fair  Insight Limited  Judgement Limited    ASSESSMENT:   Patient symptoms are:  [] Well controlled  [x] Improving  [] Worsening  [] No change      Diagnosis:   Principal Problem:    Schizophrenia, paranoid ([facility]  Active Problems:    Acute psychosis ([facility]    Constipation  Resolved Problems:    * No resolved hospital problems. *      LABS:    No results for input(s): WBC, HGB, PLT in the last 72 hours. No results for input(s): NA, K, CL, CO2, BUN, CREATININE, GLUCOSE in the last 72 hours. No results for input(s): BILITOT, ALKPHOS, AST, ALT in the last 72 hours. Lab Results   Component Value Date    LABAMPH NOT DETECTED 12/08/2020    BARBSCNU NOT DETECTED 12/08/2020    LABBENZ NOT DETECTED 12/08/2020    LABMETH NOT DETECTED 12/08/2020    OPIATESCREENURINE NOT DETECTED 12/08/2020    PHENCYCLIDINESCREENURINE NOT DETECTED 12/08/2020    ETOH <10 12/08/2020     No results found for: TSH, FREET4  No results found for: LITHIUM  Lab Results   Component Value Date    VALPROATE 18 (L) 12/17/2020         Treatment Plan:  Reviewed current Medications with the patient. Risks, benefits, side effects, drug-to-drug interactions and alternatives to treatment were discussed. Collateral information:   CD evaluation  Encourage patient to attend group and other milieu activities.   Discharge planning discussed with the patient and treatment team.      Continue Abilify 15 mg daily for psychosis  Start Cogentin 0.5mg BID for numbness and tingling of the hands   Continue Depakote 500 mg twice daily  staff confirmed patient received Abilify maintaina 400 mg IM on 11/29/2020          PSYCHOTHERAPY/COUNSELING:  [x] Therapeutic interview  [x] Supportive  [] CBT  [] Ongoing  [] Other    [x] Patient continues to need, on a daily basis, active treatment furnished directly by or requiring the supervision of inpatient psychiatric personnel      Anticipated Length of stay:            Electronically signed by SEGUN Delgadillo CNP on 12/20/2020 at 12:02 PM

## 2020-12-21 VITALS
HEART RATE: 68 BPM | WEIGHT: 150 LBS | SYSTOLIC BLOOD PRESSURE: 95 MMHG | RESPIRATION RATE: 14 BRPM | HEIGHT: 66 IN | TEMPERATURE: 98.5 F | OXYGEN SATURATION: 99 % | DIASTOLIC BLOOD PRESSURE: 54 MMHG | BODY MASS INDEX: 24.11 KG/M2

## 2020-12-21 PROCEDURE — 99232 SBSQ HOSP IP/OBS MODERATE 35: CPT | Performed by: NURSE PRACTITIONER

## 2020-12-21 PROCEDURE — 6370000000 HC RX 637 (ALT 250 FOR IP): Performed by: NURSE PRACTITIONER

## 2020-12-21 RX ORDER — LANOLIN ALCOHOL/MO/W.PET/CERES
3 CREAM (GRAM) TOPICAL NIGHTLY
Qty: 30 TABLET | Refills: 0 | Status: ON HOLD | OUTPATIENT
Start: 2020-12-21 | End: 2020-12-29 | Stop reason: SDUPTHER

## 2020-12-21 RX ORDER — DIVALPROEX SODIUM 500 MG/1
500 TABLET, DELAYED RELEASE ORAL EVERY 12 HOURS SCHEDULED
Qty: 60 TABLET | Refills: 0 | Status: ON HOLD | OUTPATIENT
Start: 2020-12-21 | End: 2020-12-29 | Stop reason: SDUPTHER

## 2020-12-21 RX ORDER — BENZTROPINE MESYLATE 0.5 MG/1
0.5 TABLET ORAL 2 TIMES DAILY
Qty: 60 TABLET | Refills: 0 | Status: SHIPPED | OUTPATIENT
Start: 2020-12-21 | End: 2021-01-20

## 2020-12-21 RX ORDER — ARIPIPRAZOLE 15 MG/1
15 TABLET ORAL DAILY
Qty: 30 TABLET | Refills: 0 | Status: ON HOLD | OUTPATIENT
Start: 2020-12-22 | End: 2020-12-29 | Stop reason: HOSPADM

## 2020-12-21 RX ADMIN — POLYETHYLENE GLYCOL 3350 17 G: 17 POWDER, FOR SOLUTION ORAL at 08:46

## 2020-12-21 RX ADMIN — MAGNESIUM HYDROXIDE/ALUMINUM HYDROXICE/SIMETHICONE 30 ML: 120; 1200; 1200 SUSPENSION ORAL at 08:46

## 2020-12-21 RX ADMIN — DIVALPROEX SODIUM 500 MG: 250 TABLET, DELAYED RELEASE ORAL at 08:46

## 2020-12-21 RX ADMIN — BENZTROPINE MESYLATE 0.5 MG: 0.5 TABLET ORAL at 08:46

## 2020-12-21 RX ADMIN — ARIPIPRAZOLE 15 MG: 15 TABLET ORAL at 08:46

## 2020-12-21 ASSESSMENT — PAIN SCALES - GENERAL: PAINLEVEL_OUTOF10: 0

## 2020-12-21 NOTE — BH NOTE
Pt denies suicidal or homicidal ideations. Pt denies hallucinations. Pt cooperative, no other distress. Will follow and monitor.

## 2020-12-21 NOTE — CARE COORDINATION
Patel scheduled [facility] to transfer this pt to [facility].  time is 4:30 PM.  will call nurse's station upon arrival. Pt's nurse aware of this.

## 2020-12-21 NOTE — SUICIDE SAFETY PLAN
SAFETY PLAN    A suicide Safety Plan is a document that supports someone when they are having thoughts of suicide. Warning Signs that indicate a suicidal crisis may be developing: What (situations, thoughts, feelings, body sensations, behaviors, etc.) do you experience that lets you know you are beginning to think about suicide? 1. Anxiety  2. Depression  3. Internal Coping Strategies:  What things can I do (relaxation techniques, hobbies, physical activities, etc.) to take my mind off my problems without contacting another person? 1. Listen to music  2. Watch TV  3. People and social settings that provide distraction: Who can I call or where can I go to distract me? 1. Name: Demetrius Aviles  Phone: [phone]  2. Name: [employer]  Phone: [phone]  3. Place:          4. Place:    People whom I can ask for help: Who can I call when I need help - for example, friends, family, clergy, someone else? 1. Name: Demetrius Aviles                 2. Name: [employer]  3. Name:     Professionals or [address] [place] [address] I can contact during a crisis: Who can I call for help - for example, my doctor, my psychiatrist, my psychologist, a mental health provider, a suicide hotline? 1. Clinician Name:   Phone:       Clinician Pager or Emergency Contact #:     2. Clinician Name:   Phone:       Clinician Pager or Emergency Contact #:     3. Suicide Prevention Lifeline: 4-818-406-TALK (0783)    4. [address] [place] [address] Emergency Services -  for example, [facility] suicide hotline, [facility] Hotline: as noted in discharge instructions. Emergency Services Address:       Emergency Services Phone:     Making the environment safe: How can I make my environment (house/apartment/living space) safer? For example, can I remove guns, medications, and other items?   1.   2.

## 2020-12-21 NOTE — GROUP NOTE
Group Therapy Note    Date: 12/21/2020    Group Start Time: 1000  Group End Time: 1003  Group Topic: Psychoeducation    [facility]    Kassandra Kc, CTRS        Group Therapy Note      Number of participants: 11  Type of group: Psychoeducation  Mode of intervention: Education, Support, Socialization, Exploration, Clarifying, and Problem-solving  Topic: Developing a Healthy Relationship with Technology  Objective: Pt will identify 1 way to create a healthy balance with technology post discharge. Patient's Goal:  Pt reports no goal.     Notes:  Pt offered minimal interaction during group but did share when prompted. . Pt accepting of support and feedback to others. Status After Intervention:  Unchanged    Participation Level:  Active Listener and Minimal    Participation Quality: Appropriate, Attentive, Sharing and Supportive      Speech:  normal      Thought Process/Content: Logical      Affective Functioning: Congruent      Mood: euthymic      Level of consciousness:  Alert, Oriented x4 and Attentive      Response to Learning: Able to verbalize current knowledge/experience, Able to verbalize/acknowledge new learning, Able to retain information, Capable of insight, Able to change behavior and Progressing to goal      Endings: None Reported    Modes of Intervention: Education, Support, Socialization, Exploration, Clarifying and Problem-solving

## 2020-12-21 NOTE — PROGRESS NOTES
Patient was out on unit this evening but did stay to his self. Denies HI, SI or AH. Positive visual hallucinations, stating seeing something out of corner of eye. Anxiety 5 out of 10 due to worried about discharge to [facility] another new place and people makes anxiety increase. Verbalizes appetite is not as good as was. Observed very preoccupied about discharge to [facility]. Compliant with medications,but does not attend groups. Safety rounds continue.

## 2020-12-21 NOTE — PLAN OF CARE
Pt is stable and alert, pt denies suicidal or homicidal ideations. Pt is cooperative, pt denies hallucinations. Pt does not report a goal presently. Pt is cooperative. Will follow and monitor.
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
## 2020-12-21 NOTE — PROGRESS NOTES
CLINICAL PHARMACY NOTE: MEDS TO [address] Select Patient?: No  Total # of Prescriptions Filled: 4   The following medications were delivered to the patient:  · Aripiprazole 15 mg  · Divalproex sodium 500 mg  · Melatonin 3 mg  · Benztropine mesylate 0.5 mg  Total # of Interventions Completed: 3  Time Spent (min): 15    Additional Documentation:

## 2020-12-22 ENCOUNTER — HOSPITAL ENCOUNTER (INPATIENT)
Age: 35
LOS: 7 days | Discharge: HOME | DRG: 885 | End: 2020-12-29
Attending: EMERGENCY MEDICINE | Admitting: PSYCHIATRY & NEUROLOGY
Payer: COMMERCIAL

## 2020-12-22 PROBLEM — F22 PARANOIA (PSYCHOSIS) (HCC): Status: ACTIVE | Noted: 2020-12-22

## 2020-12-22 LAB
ACETAMINOPHEN LEVEL: <5 MCG/ML (ref 10–30)
ALBUMIN SERPL-MCNC: 4.7 G/DL (ref 3.5–5.2)
ALP BLD-CCNC: 72 U/L (ref 40–129)
ALT SERPL-CCNC: 13 U/L (ref 0–40)
AMORPHOUS: ABNORMAL
AMPHETAMINE SCREEN, URINE: NOT DETECTED
ANION GAP SERPL CALCULATED.3IONS-SCNC: 9 MMOL/L (ref 7–16)
AST SERPL-CCNC: 12 U/L (ref 0–39)
BACTERIA: ABNORMAL /HPF
BARBITURATE SCREEN URINE: NOT DETECTED
BASOPHILS ABSOLUTE: 0.05 E9/L (ref 0–0.2)
BASOPHILS RELATIVE PERCENT: 0.6 % (ref 0–2)
BENZODIAZEPINE SCREEN, URINE: NOT DETECTED
BILIRUB SERPL-MCNC: 0.3 MG/DL (ref 0–1.2)
BILIRUBIN URINE: NEGATIVE
BLOOD, URINE: NEGATIVE
BUN BLDV-MCNC: 12 MG/DL (ref 6–20)
CALCIUM SERPL-MCNC: 9.1 MG/DL (ref 8.6–10.2)
CANNABINOID SCREEN URINE: NOT DETECTED
CHLORIDE BLD-SCNC: 103 MMOL/L (ref 98–107)
CLARITY: ABNORMAL
CO2: 29 MMOL/L (ref 22–29)
COCAINE METABOLITE SCREEN URINE: NOT DETECTED
COLOR: YELLOW
CREAT SERPL-MCNC: 1.1 MG/DL (ref 0.7–1.2)
EOSINOPHILS ABSOLUTE: 0.05 E9/L (ref 0.05–0.5)
EOSINOPHILS RELATIVE PERCENT: 0.6 % (ref 0–6)
ETHANOL: <10 MG/DL (ref 0–0.08)
FENTANYL SCREEN, URINE: NOT DETECTED
GFR AFRICAN AMERICAN: >60
GFR NON-AFRICAN AMERICAN: >60 ML/MIN/1.73
GLUCOSE BLD-MCNC: 124 MG/DL (ref 74–99)
GLUCOSE URINE: NEGATIVE MG/DL
HCT VFR BLD CALC: 43 % (ref 37–54)
HEMOGLOBIN: 14.5 G/DL (ref 12.5–16.5)
IMMATURE GRANULOCYTES #: 0.07 E9/L
IMMATURE GRANULOCYTES %: 0.9 % (ref 0–5)
KETONES, URINE: NEGATIVE MG/DL
LEUKOCYTE ESTERASE, URINE: NEGATIVE
LYMPHOCYTES ABSOLUTE: 1.55 E9/L (ref 1.5–4)
LYMPHOCYTES RELATIVE PERCENT: 19.3 % (ref 20–42)
Lab: NORMAL
MCH RBC QN AUTO: 31 PG (ref 26–35)
MCHC RBC AUTO-ENTMCNC: 33.7 % (ref 32–34.5)
MCV RBC AUTO: 92.1 FL (ref 80–99.9)
METHADONE SCREEN, URINE: NOT DETECTED
MONOCYTES ABSOLUTE: 0.55 E9/L (ref 0.1–0.95)
MONOCYTES RELATIVE PERCENT: 6.9 % (ref 2–12)
NEUTROPHILS ABSOLUTE: 5.75 E9/L (ref 1.8–7.3)
NEUTROPHILS RELATIVE PERCENT: 71.7 % (ref 43–80)
NITRITE, URINE: NEGATIVE
OPIATE SCREEN URINE: NOT DETECTED
OXYCODONE URINE: NOT DETECTED
PDW BLD-RTO: 12 FL (ref 11.5–15)
PH UA: 8.5 (ref 5–9)
PHENCYCLIDINE SCREEN URINE: NOT DETECTED
PLATELET # BLD: 151 E9/L (ref 130–450)
PMV BLD AUTO: 9.9 FL (ref 7–12)
POTASSIUM REFLEX MAGNESIUM: 4.2 MMOL/L (ref 3.5–5)
PROTEIN UA: NEGATIVE MG/DL
RBC # BLD: 4.67 E12/L (ref 3.8–5.8)
RBC UA: ABNORMAL /HPF (ref 0–2)
SALICYLATE, SERUM: <0.3 MG/DL (ref 0–30)
SARS-COV-2, NAAT: NOT DETECTED
SODIUM BLD-SCNC: 141 MMOL/L (ref 132–146)
SPECIFIC GRAVITY UA: 1.01 (ref 1–1.03)
TOTAL PROTEIN: 7 G/DL (ref 6.4–8.3)
TRICYCLIC ANTIDEPRESSANTS SCREEN SERUM: NEGATIVE NG/ML
UROBILINOGEN, URINE: 0.2 E.U./DL
VALPROIC ACID LEVEL: 74 MCG/ML (ref 50–100)
WBC # BLD: 8 E9/L (ref 4.5–11.5)
WBC UA: ABNORMAL /HPF (ref 0–5)

## 2020-12-22 PROCEDURE — 99284 EMERGENCY DEPT VISIT MOD MDM: CPT

## 2020-12-22 PROCEDURE — 81001 URINALYSIS AUTO W/SCOPE: CPT

## 2020-12-22 PROCEDURE — 80164 ASSAY DIPROPYLACETIC ACD TOT: CPT

## 2020-12-22 PROCEDURE — 6370000000 HC RX 637 (ALT 250 FOR IP): Performed by: PSYCHIATRY & NEUROLOGY

## 2020-12-22 PROCEDURE — 85025 COMPLETE CBC W/AUTO DIFF WBC: CPT

## 2020-12-22 PROCEDURE — 80053 COMPREHEN METABOLIC PANEL: CPT

## 2020-12-22 PROCEDURE — G0480 DRUG TEST DEF 1-7 CLASSES: HCPCS

## 2020-12-22 PROCEDURE — 80307 DRUG TEST PRSMV CHEM ANLYZR: CPT

## 2020-12-22 PROCEDURE — 1240000000 HC EMOTIONAL WELLNESS R&B

## 2020-12-22 PROCEDURE — U0002 COVID-19 LAB TEST NON-CDC: HCPCS

## 2020-12-22 RX ORDER — HALOPERIDOL 5 MG
5 TABLET ORAL EVERY 6 HOURS PRN
Status: DISCONTINUED | OUTPATIENT
Start: 2020-12-22 | End: 2020-12-29 | Stop reason: HOSPADM

## 2020-12-22 RX ORDER — HALOPERIDOL 5 MG/ML
5 INJECTION INTRAMUSCULAR EVERY 6 HOURS PRN
Status: DISCONTINUED | OUTPATIENT
Start: 2020-12-22 | End: 2020-12-29 | Stop reason: HOSPADM

## 2020-12-22 RX ORDER — HYDROXYZINE PAMOATE 50 MG/1
50 CAPSULE ORAL 3 TIMES DAILY PRN
Status: DISCONTINUED | OUTPATIENT
Start: 2020-12-22 | End: 2020-12-29 | Stop reason: HOSPADM

## 2020-12-22 RX ORDER — BENZTROPINE MESYLATE 0.5 MG/1
0.5 TABLET ORAL 2 TIMES DAILY
Status: DISCONTINUED | OUTPATIENT
Start: 2020-12-22 | End: 2020-12-29 | Stop reason: HOSPADM

## 2020-12-22 RX ORDER — TRAZODONE HYDROCHLORIDE 50 MG/1
50 TABLET ORAL NIGHTLY PRN
Status: DISCONTINUED | OUTPATIENT
Start: 2020-12-22 | End: 2020-12-29 | Stop reason: HOSPADM

## 2020-12-22 RX ORDER — MAGNESIUM HYDROXIDE/ALUMINUM HYDROXICE/SIMETHICONE 120; 1200; 1200 MG/30ML; MG/30ML; MG/30ML
30 SUSPENSION ORAL PRN
Status: DISCONTINUED | OUTPATIENT
Start: 2020-12-22 | End: 2020-12-29 | Stop reason: HOSPADM

## 2020-12-22 RX ORDER — DIVALPROEX SODIUM 500 MG/1
500 TABLET, DELAYED RELEASE ORAL EVERY 12 HOURS SCHEDULED
Status: DISCONTINUED | OUTPATIENT
Start: 2020-12-22 | End: 2020-12-29 | Stop reason: HOSPADM

## 2020-12-22 RX ORDER — LANOLIN ALCOHOL/MO/W.PET/CERES
3 CREAM (GRAM) TOPICAL NIGHTLY PRN
Status: DISCONTINUED | OUTPATIENT
Start: 2020-12-22 | End: 2020-12-29 | Stop reason: HOSPADM

## 2020-12-22 RX ORDER — ACETAMINOPHEN 325 MG/1
650 TABLET ORAL EVERY 6 HOURS PRN
Status: DISCONTINUED | OUTPATIENT
Start: 2020-12-22 | End: 2020-12-29 | Stop reason: HOSPADM

## 2020-12-22 RX ADMIN — Medication 3 MG: at 22:02

## 2020-12-22 RX ADMIN — BENZTROPINE MESYLATE 0.5 MG: 0.5 TABLET ORAL at 22:01

## 2020-12-22 RX ADMIN — DIVALPROEX SODIUM 500 MG: 250 TABLET, DELAYED RELEASE ORAL at 22:01

## 2020-12-22 ASSESSMENT — SLEEP AND FATIGUE QUESTIONNAIRES
DIFFICULTY ARISING: NO
DIFFICULTY STAYING ASLEEP: NO
SLEEP PATTERN: DIFFICULTY FALLING ASLEEP
DO YOU USE A SLEEP AID: YES
DO YOU HAVE DIFFICULTY SLEEPING: NO
DIFFICULTY FALLING ASLEEP: YES
RESTFUL SLEEP: YES
AVERAGE NUMBER OF SLEEP HOURS: 8

## 2020-12-22 ASSESSMENT — PAIN SCALES - GENERAL: PAINLEVEL_OUTOF10: 0

## 2020-12-22 ASSESSMENT — PATIENT HEALTH QUESTIONNAIRE - PHQ9: SUM OF ALL RESPONSES TO PHQ QUESTIONS 1-9: 9

## 2020-12-22 ASSESSMENT — LIFESTYLE VARIABLES: HISTORY_ALCOHOL_USE: NO

## 2020-12-22 NOTE — ED PROVIDER NOTES
ED Attending  CC: Bri Corral Lanedwight Hiram [address]  Department of Emergency Medicine   ED  Encounter Note  Admit Date/RoomTime: 2020  2:05 PM  ED Room: [address] [place] [address]    NAME: Alessandra Gonzales  : 1985  MRN: [id number]     Chief Complaint:  Suicidal (patient was at crisis center and made suicdal comments and sent here for eval due to him wanting to leave.)    History of Present Illness       Alessandra Gonzales is a 38 y.o. old male who presents to the emergency department via care facility's vehicle, for psychiatric evaluation/medical clearance which began a few week(s) prior to arrival.  He has a prior history of Acute psychosis with schizophrenia paranoid of which the problem is long-standing. His reported drug use history: Current alcohol. He  has had no previous counseling. Patient was recently seen on  and evaluated by our psychiatry team due to suicidal ideations. Patient did stay inpatient here for about a week and was transferred to the crisis unit. Patient states today he was expecting to go home and he saw someone \"holding a pair of scissors and thought they were going to kill him, then he became paranoid and wanted to kill himself,  therefore they sent him back in for evaluation. \"  There has been no history of recent trauma. He admits to suicidal ideation and denies homicidal ideation. Quality:      Hopelessness:   No.     Terror:   No.     Confusion:   No.     Hallucinations:   None. Able to care for self: No.  Able to control self: No.    ROS   Pertinent positives and negatives are stated within HPI, all other systems reviewed and are negative. Past Medical History:  has a past medical history of Colitis. Surgical History:  has a past surgical history that includes fracture surgery; hip surgery (Right); and Colonoscopy. Social History:  reports that he has been smoking cigarettes. He started smoking about 20 years ago. He has been smoking about 0.25 packs per day.  He has never used smokeless tobacco. He reports current alcohol use of about 28.0 standard drinks of alcohol per week. He reports that he does not use drugs. Family History: family history includes Mental Illness in his mother. Allergies: Patient has no known allergies. Physical Exam   Oxygen Saturation Interpretation: Normal.        ED Triage Vitals [12/22/20 1406]   BP Temp Temp src Pulse Resp SpO2 Height Weight   123/84 99.1 °F (37.3 °C) -- 100 16 100 % 5' 6\" (1.676 m) 150 lb (68 kg)         General Appearance:  well-appearing. Constitutional:   Level of Consciousness: Awake and alert. ETOH: No.          Distress: none. Cooperativeness: cooperative. Eyes:  PERRL, EOMI, no discharge or conjunctival injection. Ears:  External ears without lesions. Throat:  Pharynx without injection, exudate, or tonsillar hypertrophy. Airway patient. Neck:  Normal ROM. Supple. Respiratory:  Clear to auscultation and breath sounds equal.  CV:  Regular rate and rhythm, normal heart sounds, without pathological murmurs, ectopy, gallops, or rubs. GI:  Abdomen Soft, nontender, good bowel sounds. No firm or pulsatile mass. Back:  No costovertebral tenderness. Integument:  Normal turgor. Warm, dry, without visible rash, unless noted elsewhere. Lymphatics: No lymphangitis or adenopathy noted. Neurological:  Oriented. Motor functions intact. Psychiatric:        Thought Process:       Coherent:  No.        Delusions / Paranoia: yes        Flight of ideas:  No.         Rambling conversation:  No.       Affect: calm. Suicidal ideation:  suicidal ideation with no plan or intent. Homicidal ideation:  no homicidal ideation. Perceptions:  denies any perceptual disturbance present. Insight: below average. Judgement: below average.     Lab / Imaging Results   (All laboratory and radiology results have been personally reviewed by myself)  Labs:  Results for orders placed or performed during the hospital encounter of 12/22/20   Valproic acid level, total   Result Value Ref Range    Valproic Acid Lvl 74 50 - 100 mcg/mL   CBC auto differential   Result Value Ref Range    WBC 8.0 4.5 - 11.5 E9/L    RBC 4.67 3.80 - 5.80 E12/L    Hemoglobin 14.5 12.5 - 16.5 g/dL    Hematocrit 43.0 37.0 - 54.0 %    MCV 92.1 80.0 - 99.9 fL    MCH 31.0 26.0 - 35.0 pg    MCHC 33.7 32.0 - 34.5 %    RDW 12.0 11.5 - 15.0 fL    Platelets 582 816 - 614 E9/L    MPV 9.9 7.0 - 12.0 fL    Neutrophils % 71.7 43.0 - 80.0 %    Immature Granulocytes % 0.9 0.0 - 5.0 %    Lymphocytes % 19.3 (L) 20.0 - 42.0 %    Monocytes % 6.9 2.0 - 12.0 %    Eosinophils % 0.6 0.0 - 6.0 %    Basophils % 0.6 0.0 - 2.0 %    Neutrophils Absolute 5.75 1.80 - 7.30 E9/L    Immature Granulocytes # 0.07 E9/L    Lymphocytes Absolute 1.55 1.50 - 4.00 E9/L    Monocytes Absolute 0.55 0.10 - 0.95 E9/L    Eosinophils Absolute 0.05 0.05 - 0.50 E9/L    Basophils Absolute 0.05 0.00 - 0.20 E9/L   Comprehensive Metabolic Panel w/ Reflex to MG   Result Value Ref Range    Sodium 141 132 - 146 mmol/L    Potassium reflex Magnesium 4.2 3.5 - 5.0 mmol/L    Chloride 103 98 - 107 mmol/L    CO2 29 22 - 29 mmol/L    Anion Gap 9 7 - 16 mmol/L    Glucose 124 (H) 74 - 99 mg/dL    BUN 12 6 - 20 mg/dL    CREATININE 1.1 0.7 - 1.2 mg/dL    GFR Non-African American >60 >=60 mL/min/1.73    GFR African American >60     Calcium 9.1 8.6 - 10.2 mg/dL    Total Protein 7.0 6.4 - 8.3 g/dL    Alb 4.7 3.5 - 5.2 g/dL    Total Bilirubin 0.3 0.0 - 1.2 mg/dL    Alkaline Phosphatase 72 40 - 129 U/L    ALT 13 0 - 40 U/L    AST 12 0 - 39 U/L   Urinalysis with Microscopic   Result Value Ref Range    Color, UA Yellow Straw/Yellow    Clarity, UA SL CLOUDY Clear    Glucose, Ur Negative Negative mg/dL    Bilirubin Urine Negative Negative    Ketones, Urine Negative Negative mg/dL    Specific Gravity, UA 1.015 1.005 - 1.030    Blood, Urine Negative Negative    pH, UA 8.5 5.0 - 9.0    Protein, UA Negative Negative mg/dL    Urobilinogen, Urine 0.2 <2.0 E.U./dL    Nitrite, Urine Negative Negative    Leukocyte Esterase, Urine Negative Negative   Urine Drug Screen   Result Value Ref Range    Drug Screen Comment: see below    Serum Drug Screen   Result Value Ref Range    Ethanol Lvl <10 mg/dL    Acetaminophen Level <5.0 (L) 10.0 - 06.4 mcg/mL    Salicylate, Serum <0.9 0.0 - 30.0 mg/dL    TCA Scrn NEGATIVE Cutoff:300 ng/mL   COVID-19   Result Value Ref Range    SARS-CoV-2, NAAT Not Detected Not Detected     Imaging: All Radiology results interpreted by Radiologist unless otherwise noted. No orders to display       ED Course / Medical Decision Making   Medications - No data to display     Re-examination:  12/22/20       Time: Patient was seen and evaluated by my attending. Awaiting for social work evaluation, pt pink slipped    Consult(s):   IP CONSULT TO SOCIAL WORK    Procedure(s):   none    MDM:   Patient is a 50-year-old male that presented to the emergency department from the crisis unit for paranoid behavior with suicidal ideations. Patient had a full evaluation by myself, my attending, the  and it was determined patient need to be pink slipped due to suicidal ideations. Patient is a paranoid schizophrenic. Patient had a full work-up today which was found to be negative. Patient's Covid was found to be negative. Patient is medically cleared for inpatient admission    Plan of Care/Counseling:  I reviewed today's visit with the patient in addition to providing specific details for the plan of care and counseling regarding the diagnosis and prognosis. Assessment      1. Acute psychosis ([facility]    2. Suicidal ideation      Plan   Admission Full to Mental Health / Behavioral Unit - Patient is medically cleared for mental/behavioral health unit admission.   Patient condition is stable    New Medications     New Prescriptions    No medications on file     Electronically signed by Josefina Rajan ADINA Neil   DD: 12/22/20  **This report was transcribed using voice recognition software. Every effort was made to ensure accuracy; however, inadvertent computerized transcription errors may be present.   END OF ED PROVIDER NOTE        Staci Shah PA-C  12/22/20 9532

## 2020-12-22 NOTE — ED NOTES
Bed: [facility]  Expected date:   Expected time:   Means of arrival:   Comments:  sherly Kelley RN  12/22/20 2664
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
## 2020-12-23 PROCEDURE — 6370000000 HC RX 637 (ALT 250 FOR IP): Performed by: NURSE PRACTITIONER

## 2020-12-23 PROCEDURE — 99222 1ST HOSP IP/OBS MODERATE 55: CPT | Performed by: NURSE PRACTITIONER

## 2020-12-23 PROCEDURE — 6370000000 HC RX 637 (ALT 250 FOR IP): Performed by: PSYCHIATRY & NEUROLOGY

## 2020-12-23 PROCEDURE — 1240000000 HC EMOTIONAL WELLNESS R&B

## 2020-12-23 RX ORDER — ARIPIPRAZOLE 15 MG/1
15 TABLET ORAL DAILY
Status: DISCONTINUED | OUTPATIENT
Start: 2020-12-23 | End: 2020-12-25

## 2020-12-23 RX ADMIN — BENZTROPINE MESYLATE 0.5 MG: 0.5 TABLET ORAL at 08:53

## 2020-12-23 RX ADMIN — DIVALPROEX SODIUM 500 MG: 250 TABLET, DELAYED RELEASE ORAL at 08:53

## 2020-12-23 RX ADMIN — TRAZODONE HYDROCHLORIDE 50 MG: 50 TABLET ORAL at 20:10

## 2020-12-23 RX ADMIN — ARIPIPRAZOLE 15 MG: 15 TABLET ORAL at 11:21

## 2020-12-23 RX ADMIN — DIVALPROEX SODIUM 500 MG: 250 TABLET, DELAYED RELEASE ORAL at 20:10

## 2020-12-23 RX ADMIN — BENZTROPINE MESYLATE 0.5 MG: 0.5 TABLET ORAL at 20:10

## 2020-12-23 ASSESSMENT — PAIN SCALES - GENERAL
PAINLEVEL_OUTOF10: 0

## 2020-12-23 ASSESSMENT — SLEEP AND FATIGUE QUESTIONNAIRES
DIFFICULTY STAYING ASLEEP: NO
DO YOU HAVE DIFFICULTY SLEEPING: NO
SLEEP PATTERN: NORMAL
DO YOU USE A SLEEP AID: YES
RESTFUL SLEEP: YES
AVERAGE NUMBER OF SLEEP HOURS: 8
DIFFICULTY FALLING ASLEEP: NO
DIFFICULTY ARISING: NO

## 2020-12-23 ASSESSMENT — PATIENT HEALTH QUESTIONNAIRE - PHQ9: SUM OF ALL RESPONSES TO PHQ QUESTIONS 1-9: 12

## 2020-12-23 ASSESSMENT — LIFESTYLE VARIABLES: HISTORY_ALCOHOL_USE: NO

## 2020-12-23 NOTE — DISCHARGE SUMMARY
clubs or organizations: Not on file     Relationship status: Not on file    Intimate partner violence     Fear of current or ex partner: Not on file     Emotionally abused: Not on file     Physically abused: Not on file     Forced sexual activity: Not on file   Other Topics Concern    Not on file   Social History Narrative    Not on file       MEDICATIONS:  No current facility-administered medications for this encounter. No current outpatient medications on file.     Facility-Administered Medications Ordered in Other Encounters:     ARIPiprazole (ABILIFY) tablet 15 mg, 15 mg, Oral, Daily, SEGUN Julio - CNP, 15 mg at 12/23/20 1121    acetaminophen (TYLENOL) tablet 650 mg, 650 mg, Oral, Q6H PRN, Sharmila Justin MD    magnesium hydroxide (MILK OF MAGNESIA) 400 MG/5ML suspension 30 mL, 30 mL, Oral, Daily PRN, Sharmila Justin MD    aluminum & magnesium hydroxide-simethicone (MAALOX) 200-200-20 MG/5ML suspension 30 mL, 30 mL, Oral, PRN, Sharmila Justin MD    hydrOXYzine (VISTARIL) capsule 50 mg, 50 mg, Oral, TID PRN, Sharmila Justin MD    haloperidol lactate (HALDOL) injection 5 mg, 5 mg, Intramuscular, Q6H PRN **OR** haloperidol (HALDOL) tablet 5 mg, 5 mg, Oral, Q6H PRN, Sharmila Justin MD    traZODone (DESYREL) tablet 50 mg, 50 mg, Oral, Nightly PRN, Sharmila Justin MD    nicotine polacrilex (NICORETTE) gum 2 mg, 2 mg, Oral, PRN, Sharmila Justin MD    divalproex (DEPAKOTE) DR tablet 500 mg, 500 mg, Oral, 2 times per day, Sharmila Justin MD, 500 mg at 12/23/20 0853    benztropine (COGENTIN) tablet 0.5 mg, 0.5 mg, Oral, BID, Sharmila Justin MD, 0.5 mg at 12/23/20 0853    melatonin tablet 3 mg, 3 mg, Oral, Nightly PRN, Sharmila Justin MD, 3 mg at 12/22/20 2202    Examination:  BP (!) 95/54   Pulse 68   Temp 98.5 °F (36.9 °C) (Temporal)   Resp 14   Ht 5' 6\" (1.676 m)   Wt 150 lb (68 kg)   SpO2 99%   BMI 24.21 kg/m²   Gait - steady    HOSPITAL COURSE[de-identified]  Patient was admitted to unit on 12/8/2020 was closely monitored for psychosis and suicidal ideations. He was originally evaluated and was treated with Risperdal which is optimized up to 2 mg twice daily however patient no longer want to take the Risperdal he reported that he was having lots of side effects from it and we went back on the Abilify 15 mg daily and Depakote 500 mg twice daily. He is given a prescription to have his valproic acid level checked after discharge. Medical events were insignificant patient continued improve on the floor. He start coming out of his room he was attending groups and socializing with peers. He never made any suicidal statements or any suicidal gestures on the unit. He start coming of his room is attending groups on the unit. He did continue to be mostly isolative however he reported he was feeling less paranoid and he was able to leave his room.  obtain confirmation patient's aunt who is able voicing concerns that she had. Patient agreed to stepdown to the crisis stabilization unit for next level of care and further stabilization. At the time of discharge patient not shown impulsive behavior. He vehemently denied any suicidal homicidal ideations intent or plan. He was eating well sleeping well there are no neurovegetative signs or symptoms of depression. He denied any auditory or visual hallucinations or no overt overt signs psychosis. Appreciate help that he received here. This patient no longer meets criteria for inpatient hospitalization.        No AVH or paranoid thoughts  No hopeless or worthless feeling  No active SI/HI  Appetite:  [x] Normal  [] Increased  [] Decreased    Sleep:       [x] Normal  [] Fair       [] Poor            Energy:    [x] Normal  [] Increased  [] Decreased     SI [] Present  [x] Absent  HI  []Present  [x] Absent   Aggression:  [] yes  [x] no  Patient is [x] able  [] unable to CONTRACT FOR SAFETY   Medication side effects(SE):  [x] None(Psych. Meds.) [] Other      Mental Status Examination on discharge:    Level of consciousness:  within normal limits   Appearance:  well-appearing  Behavior/Motor:  no abnormalities noted  Attitude toward examiner:  attentive and good eye contact  Speech:  spontaneous, normal rate and normal volume   Mood: \" I am doing okay. \"  Affect: Appropriate a little flat  Thought processes: Linear without flight of ideas of associations  Thought content: Devoid of any auditory visualizations delusions or any perceptual maladies. Denies SI/HI intent or plan  Cognition:  oriented to person, place, and time   Concentration intact  Memory intact  Insight good   Judgement fair   Fund of Knowledge adequate      ASSESSMENT:  Patient symptoms are:  [x] Well controlled  [x] Improving  [] Worsening  [] No change    Reason for more than one antipsychotic:  [x] N/A  [] 3 Failed Monotherapy attempts (Drugs tried:)  [] Crossover to a new antipsychotic  [] Taper to Monotherapy from Polypharmacy  [] Augmentation of clozapine therapy due to treatment resistance to single therapy    Diagnosis:  Principal Problem:    Schizophrenia, paranoid ([facility] [address]  Active Problems:    [facility]    Constipation  Resolved Problems:    * No resolved hospital problems.  *      LABS:    Recent Labs     12/22/20  1523   WBC 8.0   HGB 14.5        Recent Labs     12/22/20  1523      K 4.2      CO2 29   BUN 12   CREATININE 1.1   GLUCOSE 124*     Recent Labs     12/22/20  1523   BILITOT 0.3   ALKPHOS 72   AST 12   ALT 13     Lab Results   Component Value Date    LABAMPH NOT DETECTED 12/22/2020    BARBSCNU NOT DETECTED 12/22/2020    LABBENZ NOT DETECTED 12/22/2020    LABMETH NOT DETECTED 12/22/2020    OPIATESCREENURINE NOT DETECTED 12/22/2020    PHENCYCLIDINESCREENURINE NOT DETECTED 12/22/2020    ETOH <10 12/22/2020     No results found for: TSH, FREET4  No results found for: LITHIUM  Lab Results   Component Value Date    VALPROATE 74 12/22/2020       RISK ASSESSMENT AT DISCHARGE: Low risk for suicide and homicide. Treatment Plan:  Reviewed current Medications with the patient. Education provided on the complaince with treatment. Risks, benefits, side effects, drug-to-drug interactions and alternatives to treatment were discussed. Encourage patient to attend outpatient follow up appointment and therapy. Patient was advised to call the outpatient provider, visit the nearest ED or call 911 if symptoms are not manageable. Patient's family member was contacted prior to the discharge. Medication List      START taking these medications    benztropine 0.5 MG tablet  Commonly known as: COGENTIN  Take 1 tablet by mouth 2 times daily     melatonin 3 MG Tabs tablet  Take 1 tablet by mouth nightly        CHANGE how you take these medications    * ARIPiprazole 15 MG tablet  Commonly known as: ABILIFY  Take 1 tablet by mouth daily  What changed:   · medication strength  · how much to take  · when to take this  · additional instructions     * ARIPiprazole  MG Prsy  Commonly known as: ABILIFY MAINTENA  Start taking on: December 29, 2020  What changed: Another medication with the same name was changed. Make sure you understand how and when to take each. divalproex 500 MG DR tablet  Commonly known as: DEPAKOTE  Take 1 tablet by mouth every 12 hours  What changed:   · how much to take  · when to take this         * This list has 2 medication(s) that are the same as other medications prescribed for you. Read the directions carefully, and ask your doctor or other care provider to review them with you.                Where to Get Your Medications      These medications were sent to [facility] [place] [address]    Phone: [phone]   · ARIPiprazole 15 MG tablet  · benztropine 0.5 MG tablet  · divalproex 500 MG DR tablet  · melatonin 3 MG Tabs tablet     Patient is counseled if he continues to abuse drugs or alcohol he could act out impulsively causing serious harm to himself or others even though may be unintentional.  He demonstrated understanding of this and has the capacity understand this    Patient is counseled he must been compliant with all medications outpatient follow-up appointments    Patient is discharged to the crisis stabilization unit in stable condition      TIME SPEND - 35 MINUTES TO COMPLETE THE EVALUATION, DISCHARGE SUMMARY, MEDICATION RECONCILIATION AND FOLLOW UP CARE     Signed:  Verner Blumenthal  24/21/4870  0:23 PM

## 2020-12-23 NOTE — ED NOTES
Pt admitted [address] [facility] AN AFFILIATE OF [facility] nurse Sukh Donato in admitting advised of assigned bed number Mary Jane Rodriguez on [address] aware of pending admission, [facility].       [address], [facility] [place], [place]  12/22/20 3693
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
## 2020-12-23 NOTE — PLAN OF CARE
Pt is stable. Without immediate distress. Pt denies suicidal thoughts at this time. Pt denies homicidal ideations. Pt denies hallucinations. Will follow and monitor.    Pt does not report a goal.

## 2020-12-23 NOTE — BH NOTE
Pt denies suicidal or homicidal ideations. Pt denies hallucinations. Pt without distress presently. Pt cooperative. Will follow and monitor.

## 2020-12-23 NOTE — H&P
Department of Psychiatry  History and Physical - Adult     CHIEF COMPLAINT: \"I thought somebody was going to come to the bathroom with scissors and stabbed me\"    Patient was seen after discussing with the treatment team and reviewing the chart    CIRCUMSTANCES OF ADMISSION: Presented the ED  sent in for the crisis unit reporting paranoia and suicidal ideations    HISTORY OF PRESENT ILLNESS:      The patient is a 38 y.o. male with significant past history of colitis and schizophrenia with 2 recent inpatient psychiatric hospitalizations when in [place] for 3 weeks and 1 recently here at [facility] where he was recently stepped down to the crisis unit on Monday presented to the ED from the crisis unit reporting paranoia that he believes someone was coming into the bathroom with scissors to stab him and also suicidal ideations. In the ED his urine drug screen is negative his blood alcohol level is negative he is medically cleared admitted to [address] [place] [address] adult psychiatric unit for further psychiatric assessment stabilization and treatment. I saw patient this morning in his room he appears visibly paranoid. He states that he came back into the hospital because he felt like someone of the crisis unit was going to go into the bathroom and stabbed him with a pair of scissors. Patient's eyes live to his room he is not leaving his room not socializing with peers not attending groups. He endorses high levels of paranoia fleeting suicidal ideations. He denies any auditory or visual hallucinations affect is flat and blunted    Past psychiatric history:  Patient was hospitalized at a psychiatric facility in [place] for 3 weeks. Most recently here at [facility] where he was discharged on Monday But denies prior psychiatric history. He is currently on Abilify 10 mg. Abilify Maintena 400 mg.      Legal history:  Probation in 2008 for DUI     Substance use history:  Patient endorses that up until November he was drinking 3-4 beers nightly but stopped drinking in November. He denies any other substance use. Personal family and social history:  Per the patient he grew up in [facility] his parents raised him, he has 1 brother, and 1 sister,. The patient states that he graduated high school he did not go to college after high school. He has never been  and he has no children. He states that he has worked odd jobs, and worked in construction and maintenance. He states that he lost his job 1-1/2 months ago after an argument with his employer. Is currently not employed. Past Medical History:        Diagnosis Date    Colitis     1996       Medications Prior to Admission:   Medications Prior to Admission: ARIPiprazole (ABILIFY) 15 MG tablet, Take 1 tablet by mouth daily  divalproex (DEPAKOTE) 500 MG DR tablet, Take 1 tablet by mouth every 12 hours  benztropine (COGENTIN) 0.5 MG tablet, Take 1 tablet by mouth 2 times daily  melatonin 3 MG TABS tablet, Take 1 tablet by mouth nightly  [START ON 12/29/2020] ARIPiprazole ER (ABILIFY MAINTENA) 400 MG PRSY, Inject 400 mg into the muscle every 30 days Last dose 11/29/20    Past Surgical History:        Procedure Laterality Date    COLONOSCOPY      FRACTURE SURGERY      HIP SURGERY Right     age 10 fractured hip        Allergies:   Patient has no known allergies. Family History  Family History   Problem Relation Age of Onset    Mental Illness Mother              EXAMINATION:    REVIEW OF SYSTEMS:    ROS:  [x] All negative/unchanged except if checked.  Explain positive(checked items) below:  [] Constitutional  [] Eyes  [] Ear/Nose/Mouth/Throat  [] Respiratory  [] CV  [] GI  []   [] Musculoskeletal  [] Skin/Breast  [] Neurological  [] Endocrine  [] Heme/Lymph  [] Allergic/Immunologic    Explanation:     Vitals:  /61   Pulse 57   Temp 98.2 °F (36.8 °C) (Tympanic)   Resp 16   Ht 5' 6\" (1.676 m)   Wt 141 lb 1 oz (64 kg)   SpO2 100%   BMI 22.77 kg/m² OPIATESCREENURINE NOT DETECTED 12/22/2020    PHENCYCLIDINESCREENURINE NOT DETECTED 12/22/2020    ETOH <10 12/22/2020     No results found for: TSH, FREET4  No results found for: LITHIUM  Lab Results   Component Value Date    VALPROATE 74 12/22/2020     Lab Results   Component Value Date    VALPROATE 74 12/22/2020         Radiology Xr Abdomen (kub) (single Ap View)    Result Date: 12/15/2020  EXAMINATION: ONE SUPINE XRAY VIEW(S) OF THE ABDOMEN 12/15/2020 2:39 pm COMPARISON: None. HISTORY: ORDERING SYSTEM PROVIDED HISTORY: constipation TECHNOLOGIST PROVIDED HISTORY: Reason for exam:->constipation What reading provider will be dictating this exam?->CRC FINDINGS: Moderate fecal retention is seen throughout the colon indicating constipation. No radiographic evidence of bowel obstruction. No nephrolithiasis is seen. The visualized bones are intact without fracture or focal lesion. Constipation. No radiographic evidence of mechanical obstruction        TREATMENT PLAN:    Risk Management: Based on the diagnosis and assessment biopsychosocial treatment model was presented to the patient and was given the opportunity to ask any question. The patient was agreeable to the plan and all the patient's questions were answered to the patient's satisfaction. I discussed with the patient the risk, benefit, alternative and common side effects for the proposed medication treatment. The patient is consenting to this treatment. Collateral Information:  Will obtain collateral information from the family or friends. Will obtain medical records as appropriate from out patient providers  Will consult the hospitalist for a physical exam to rule out any co-morbid physical condition.       Patient seen and plan discussed with Dr. Jensen Arm 15 mg daily for psychosis  Continue Depakote 500 mg twice daily for mood stabilization  We'll also continue patient's Abilify maintaina injection 400 mg IM received on 11/29/2020 next injection was due 12/27/2020        Prn Haldol 5mg and Vistaril 50mg q6hr for extreme agitation. Trazodone as ordered for insomnia  Vistaril as ordered for anxiety      Psychotherapy:   Encourage participation in milieu and group therapy  Individual therapy as needed        Behavioral Services  Medicare Certification      Admission Day 1  I certify that this patient's inpatient psychiatric hospital admission is medically necessary for:     (1) treatment which could reasonably be expected to improve this patient's condition, or     (2) diagnostic study or its equivalent.        Electronically signed by SEGUN Loomis CNP on 30/32/2398 at 8:33 AM

## 2020-12-23 NOTE — BH NOTE
[facility]  Initial Interdisciplinary Treatment Plan NOTE    Review Date & Time: 12-23-20  1000 am    Patient was not in treatment team.    Admission Type:   Admission Type: Involuntary    Reason for admission:  Reason for Admission: \"CAUSE I  SAID  I'D MIGHT AS WELL KILL MYSELF. MY MEDICINE WAS'T WORKING RIGHT AND TODAY I SAW THAT ANGELES WITH THE SCSSORS IN THE BATHROOM AT [facility] TODAY\"      Estimated Length of Stay Update:  3-5 days  Estimated Discharge Date Update: 3-5 days    PATIENT STRENGTHS:  Patient Strengths Strengths: Communication, Connection to output provider, Positive Support, Medication Compliance, No significant Physical Illness, Social Skills  Patient Strengths and Limitations:Limitations: Tendency to isolate self  Addictive Behavior:Addictive Behavior  In the past 3 months, have you felt or has someone told you that you have a problem with:  : None  Do you have a history of Chemical Use?: No  Do you have a history of Alcohol Use?: No  Do you have a history of Street Drug Abuse?: No  Histroy of Prescripton Drug Abuse?: No  Medical Problems:  Past Medical History:   Diagnosis Date    Colitis     1996       EDUCATION:   Learner Progress Toward Treatment Goals: Reviewed results and recommendations of this team and Reviewed group plan and strategies    Method: Small group    Outcome: Verbalized understanding and Demonstrated Understanding    PATIENT GOALS: pt does not report a goal presently. PLAN/TREATMENT RECOMMENDATIONS UPDATE: begin medication regimen and assess pt responses to them. GOALS UPDATE:   Time frame for Short-Term Goals: : daily re assessment.      Eunice Hernandez RN Unknown if ever smoked

## 2020-12-24 PROCEDURE — 6370000000 HC RX 637 (ALT 250 FOR IP): Performed by: PSYCHIATRY & NEUROLOGY

## 2020-12-24 PROCEDURE — 1240000000 HC EMOTIONAL WELLNESS R&B

## 2020-12-24 PROCEDURE — 6370000000 HC RX 637 (ALT 250 FOR IP): Performed by: NURSE PRACTITIONER

## 2020-12-24 PROCEDURE — 99232 SBSQ HOSP IP/OBS MODERATE 35: CPT | Performed by: NURSE PRACTITIONER

## 2020-12-24 RX ADMIN — TRAZODONE HYDROCHLORIDE 50 MG: 50 TABLET ORAL at 20:47

## 2020-12-24 RX ADMIN — BENZTROPINE MESYLATE 0.5 MG: 0.5 TABLET ORAL at 08:55

## 2020-12-24 RX ADMIN — DIVALPROEX SODIUM 500 MG: 250 TABLET, DELAYED RELEASE ORAL at 08:55

## 2020-12-24 RX ADMIN — DIVALPROEX SODIUM 500 MG: 250 TABLET, DELAYED RELEASE ORAL at 20:47

## 2020-12-24 RX ADMIN — BENZTROPINE MESYLATE 0.5 MG: 0.5 TABLET ORAL at 20:47

## 2020-12-24 RX ADMIN — ARIPIPRAZOLE 15 MG: 15 TABLET ORAL at 08:55

## 2020-12-24 ASSESSMENT — PAIN SCALES - GENERAL: PAINLEVEL_OUTOF10: 0

## 2020-12-24 NOTE — PROGRESS NOTES
BEHAVIORAL HEALTH FOLLOW-UP NOTE     12/24/2020     Patient was seen and examined in person, Chart reviewed   Patient's case discussed with staff/team      Chief Complaint: \" I am still feeling a little paranoid above able to eat outside\"    Interim History: Patient seen in his room. He remains paranoid and guarded he is on attending groups of socializing with peers he still believes that people are out to get him and he has to watch his back. He states the suicidal thoughts come and go he denies any auditory visual hallucinations    Appetite:   [x] Normal/Unchanged  [] Increased  [] Decreased      Sleep:       [x] Normal/Unchanged  [] Fair       [] Poor              Energy:    [x] Normal/Unchanged  [] Increased  [] Decreased        SI [x] Present  [] Absent    HI  []Present  [x] Absent     Aggression:  [] yes  [x] no    Patient is [x] able  [] unable to CONTRACT FOR SAFETY     PAST MEDICAL/PSYCHIATRIC HISTORY:   Past Medical History:   Diagnosis Date    Colitis     1996       FAMILY/SOCIAL HISTORY:  Family History   Problem Relation Age of Onset    Mental Illness Mother      Social History     Socioeconomic History    Marital status: Single     Spouse name: Not on file    Number of children: 0    Years of education: 8th grade     Highest education level: Not on file   Occupational History    Occupation: construction     Employer: UNEMPLOYED     Comment: last worked Nov 1. Social Needs    Financial resource strain: Not on file    Food insecurity     Worry: Not on file     Inability: Not on file    Transportation needs     Medical: Not on file     Non-medical: Not on file   Tobacco Use    Smoking status: Current Every Day Smoker     Packs/day: 0.25     Years: 20.00     Pack years: 5.00     Types: Cigarettes     Start date: 1/8/2000    Smokeless tobacco: Never Used   Substance and Sexual Activity    Alcohol use:  Yes     Alcohol/week: 1.0 - 2.0 standard drinks     Types: 1 - 2 Cans of beer per week Comment: none in 2 weeks    Drug use: Never    Sexual activity: Not Currently   Lifestyle    Physical activity     Days per week: Not on file     Minutes per session: Not on file    Stress: Not on file   Relationships    Social connections     Talks on phone: Not on file     Gets together: Not on file     Attends Latter-day service: Not on file     Active member of club or organization: Not on file     Attends meetings of clubs or organizations: Not on file     Relationship status: Not on file    Intimate partner violence     Fear of current or ex partner: Not on file     Emotionally abused: Not on file     Physically abused: Not on file     Forced sexual activity: Not on file   Other Topics Concern    Not on file   Social History Narrative    Not on file           ROS:  [x] All negative/unchanged except if checked.  Explain positive(checked items) below:  [] Constitutional  [] Eyes  [] Ear/Nose/Mouth/Throat  [] Respiratory  [] CV  [] GI  []   [] Musculoskeletal  [] Skin/Breast  [] Neurological  [] Endocrine  [] Heme/Lymph  [] Allergic/Immunologic    Explanation:     MEDICATIONS:    Current Facility-Administered Medications:     ARIPiprazole (ABILIFY) tablet 15 mg, 15 mg, Oral, Daily, Rex Reza, APRN - CNP, 15 mg at 12/23/20 1121    acetaminophen (TYLENOL) tablet 650 mg, 650 mg, Oral, Q6H PRN, Arturo Johnston MD    magnesium hydroxide (MILK OF MAGNESIA) 400 MG/5ML suspension 30 mL, 30 mL, Oral, Daily PRN, Arturo Johnston MD    aluminum & magnesium hydroxide-simethicone (MAALOX) 200-200-20 MG/5ML suspension 30 mL, 30 mL, Oral, PRN, Arturo Johnston MD    hydrOXYzine (VISTARIL) capsule 50 mg, 50 mg, Oral, TID PRN, Arturo Johnston MD    haloperidol lactate (HALDOL) injection 5 mg, 5 mg, Intramuscular, Q6H PRN **OR** haloperidol (HALDOL) tablet 5 mg, 5 mg, Oral, Q6H PRN, Arturo Johnston MD    traZODone (DESYREL) tablet 50 mg, 50 mg, Oral, Nightly PRN, Arturo Johnston MD, 50 mg at 12/23/20 2010    nicotine polacrilex (NICORETTE) gum 2 mg, 2 mg, Oral, PRN, Arturo Johnston MD    divalproex (DEPAKOTE) DR tablet 500 mg, 500 mg, Oral, 2 times per day, Arturo Johnston MD, 500 mg at 12/23/20 2010    benztropine (COGENTIN) tablet 0.5 mg, 0.5 mg, Oral, BID, Arturo Johnston MD, 0.5 mg at 12/23/20 2010    melatonin tablet 3 mg, 3 mg, Oral, Nightly PRN, Arturo Johnston MD, 3 mg at 12/22/20 2202      Examination:  BP (!) 91/50   Pulse 60   Temp 98.1 °F (36.7 °C) (Oral)   Resp 14   Ht 5' 6\" (1.676 m)   Wt 141 lb 1 oz (64 kg)   SpO2 99%   BMI 22.77 kg/m²   Gait - steady  Medication side effects(SE): Denies    Mental Status Examination:    Level of consciousness:  within normal limits   Appearance:  fair grooming and fair hygiene  Behavior/Motor:  no abnormalities noted  Attitude toward examiner:  cooperative  Speech:  spontaneous, normal rate and normal volume   Mood: \" My mood is okay. \"  Affect: Mood incongruent flat and blunted  thought processes: Linear without flight of ideas loose associations  Thought content: With paranoid delusions endorses suicidal ideations denies homicidal ideations intent or plan denies auditory visual hallucinations ideations intent or plan cognition:  oriented to person, place, and time   Concentration fair  Insight Limited  Judgement Limited    ASSESSMENT:   Patient symptoms are:  [] Well controlled  [x] Improving  [] Worsening  [] No change      Diagnosis:   Principal Problem:    Schizophrenia, paranoid ([facility] [address]  Resolved Problems:    * No resolved hospital problems.  *      LABS:    Recent Labs     12/22/20  1523   WBC 8.0   HGB 14.5        Recent Labs     12/22/20  1523      K 4.2      CO2 29   BUN 12   CREATININE 1.1   GLUCOSE 124*     Recent Labs     12/22/20  1523   BILITOT 0.3   ALKPHOS 72   AST 12   ALT 13     Lab Results   Component Value Date    LABAMPH NOT DETECTED 12/22/2020    BARBSCNU NOT DETECTED 12/22/2020    LABBENZ NOT DETECTED 12/22/2020    LABMETH NOT DETECTED 12/22/2020    OPIATESCREENURINE NOT DETECTED 12/22/2020    PHENCYCLIDINESCREENURINE NOT DETECTED 12/22/2020    ETOH <10 12/22/2020     No results found for: TSH, FREET4  No results found for: LITHIUM  Lab Results   Component Value Date    VALPROATE 74 12/22/2020         Treatment Plan:  Reviewed current Medications with the patient. Risks, benefits, side effects, drug-to-drug interactions and alternatives to treatment were discussed. Collateral information:   CD evaluation  Encourage patient to attend group and other milieu activities.   Discharge planning discussed with the patient and treatment team.      Continue Abilify 15 mg daily for psychosis  Continue Depakote 500 mg twice daily  Continue Cogentin 0.5 mg twice daily for side effects  staff confirmed patient received Abilify maintaina 400 mg IM on 11/29/2020          PSYCHOTHERAPY/COUNSELING:  [x] Therapeutic interview  [x] Supportive  [] CBT  [] Ongoing  [] Other    [x] Patient continues to need, on a daily basis, active treatment furnished directly by or requiring the supervision of inpatient psychiatric personnel      Anticipated Length of stay:            Electronically signed by SEGUN Moore CNP on 52/64/0958 at 8:23 AM

## 2020-12-24 NOTE — PLAN OF CARE
Problem: Depressive Behavior With or Without Suicide Precautions:  Goal: Able to verbalize and/or display a decrease in depressive symptoms  Description: Able to verbalize and/or display a decrease in depressive symptoms  12/24/2020 1627 by Clara Machado RN  Outcome: Ongoing     Problem: Depressive Behavior With or Without Suicide Precautions:  Goal: Ability to disclose and discuss suicidal ideas will improve  Description: Ability to disclose and discuss suicidal ideas will improve  12/24/2020 1627 by Clara Machado RN  Outcome: Ongoing   Pt continues to be withdrawn to his room and on approach he presents as suspicious. He is guarded and superficial during interaction with him. Affect is sad and he states that he continues to feel depressed with fleeting suicidal ideations. He denies any plan. He does admit to feeling paranoid and states that he gets very anxious when he is out of his room and on the unit. He denies any homicidal thoughts or hallucinations.

## 2020-12-24 NOTE — PLAN OF CARE
Denies SI/HI   denies hallucinations  isolative to room  paranoid and suspicious  states he doesn't feel comfortable out of his room because \"I have to watch my back\"   admits that he continues to feel like people are after him   cooperative with meds   evasive at times   will continue to monitor

## 2020-12-25 PROCEDURE — 99232 SBSQ HOSP IP/OBS MODERATE 35: CPT | Performed by: NURSE PRACTITIONER

## 2020-12-25 PROCEDURE — 6370000000 HC RX 637 (ALT 250 FOR IP): Performed by: NURSE PRACTITIONER

## 2020-12-25 PROCEDURE — 6370000000 HC RX 637 (ALT 250 FOR IP): Performed by: PSYCHIATRY & NEUROLOGY

## 2020-12-25 PROCEDURE — 1240000000 HC EMOTIONAL WELLNESS R&B

## 2020-12-25 RX ORDER — ARIPIPRAZOLE 10 MG/1
20 TABLET ORAL DAILY
Status: DISCONTINUED | OUTPATIENT
Start: 2020-12-26 | End: 2020-12-29 | Stop reason: HOSPADM

## 2020-12-25 RX ADMIN — DIVALPROEX SODIUM 500 MG: 250 TABLET, DELAYED RELEASE ORAL at 20:36

## 2020-12-25 RX ADMIN — BENZTROPINE MESYLATE 0.5 MG: 0.5 TABLET ORAL at 08:20

## 2020-12-25 RX ADMIN — BENZTROPINE MESYLATE 0.5 MG: 0.5 TABLET ORAL at 20:36

## 2020-12-25 RX ADMIN — DIVALPROEX SODIUM 500 MG: 250 TABLET, DELAYED RELEASE ORAL at 08:19

## 2020-12-25 RX ADMIN — ARIPIPRAZOLE 15 MG: 15 TABLET ORAL at 08:20

## 2020-12-25 ASSESSMENT — PAIN SCALES - GENERAL
PAINLEVEL_OUTOF10: 0

## 2020-12-26 PROCEDURE — 6370000000 HC RX 637 (ALT 250 FOR IP): Performed by: PSYCHIATRY & NEUROLOGY

## 2020-12-26 PROCEDURE — 99232 SBSQ HOSP IP/OBS MODERATE 35: CPT | Performed by: NURSE PRACTITIONER

## 2020-12-26 PROCEDURE — 1240000000 HC EMOTIONAL WELLNESS R&B

## 2020-12-26 PROCEDURE — 6370000000 HC RX 637 (ALT 250 FOR IP): Performed by: NURSE PRACTITIONER

## 2020-12-26 RX ADMIN — DIVALPROEX SODIUM 500 MG: 250 TABLET, DELAYED RELEASE ORAL at 08:38

## 2020-12-26 RX ADMIN — ARIPIPRAZOLE 20 MG: 10 TABLET ORAL at 08:37

## 2020-12-26 RX ADMIN — DIVALPROEX SODIUM 500 MG: 250 TABLET, DELAYED RELEASE ORAL at 20:38

## 2020-12-26 RX ADMIN — BENZTROPINE MESYLATE 0.5 MG: 0.5 TABLET ORAL at 20:38

## 2020-12-26 RX ADMIN — BENZTROPINE MESYLATE 0.5 MG: 0.5 TABLET ORAL at 08:37

## 2020-12-26 RX ADMIN — Medication 3 MG: at 20:39

## 2020-12-26 ASSESSMENT — PAIN SCALES - GENERAL
PAINLEVEL_OUTOF10: 0
PAINLEVEL_OUTOF10: 0

## 2020-12-26 NOTE — PROGRESS NOTES
BEHAVIORAL HEALTH FOLLOW-UP NOTE     12/26/2020     Patient was seen and examined in person, Chart reviewed   Patient's case discussed with staff/team      Chief Complaint: \"I still feel like someone is out to get me I do not know who\"    Interim History: Patient seen in his room. He remains paranoid and guarded he is not attending groups or socializing with peers she stays in his room all day believes someone is out to get him. He is very suspicious he appears anxious and guarded. He is to have fleeting passive suicidal ideations he denies auditory visual hallucinations      Appetite:   [x] Normal/Unchanged  [] Increased  [] Decreased      Sleep:       [x] Normal/Unchanged  [] Fair       [] Poor              Energy:    [x] Normal/Unchanged  [] Increased  [] Decreased        SI [x] Present  [] Absent    HI  []Present  [x] Absent     Aggression:  [] yes  [x] no    Patient is [x] able  [] unable to CONTRACT FOR SAFETY     PAST MEDICAL/PSYCHIATRIC HISTORY:   Past Medical History:   Diagnosis Date    Colitis     1996       FAMILY/SOCIAL HISTORY:  Family History   Problem Relation Age of Onset    Mental Illness Mother      Social History     Socioeconomic History    Marital status: Single     Spouse name: Not on file    Number of children: 0    Years of education: 8th grade     Highest education level: Not on file   Occupational History    Occupation: construction     Employer: UNEMPLOYED     Comment: last worked Nov 1. Social Needs    Financial resource strain: Not on file    Food insecurity     Worry: Not on file     Inability: Not on file    Transportation needs     Medical: Not on file     Non-medical: Not on file   Tobacco Use    Smoking status: Current Every Day Smoker     Packs/day: 0.25     Years: 20.00     Pack years: 5.00     Types: Cigarettes     Start date: 1/8/2000    Smokeless tobacco: Never Used   Substance and Sexual Activity    Alcohol use:  Yes     Alcohol/week: 1.0 - 2.0 standard drinks     Types: 1 - 2 Cans of beer per week     Comment: none in 2 weeks    Drug use: Never    Sexual activity: Not Currently   Lifestyle    Physical activity     Days per week: Not on file     Minutes per session: Not on file    Stress: Not on file   Relationships    Social connections     Talks on phone: Not on file     Gets together: Not on file     Attends Evangelical service: Not on file     Active member of club or organization: Not on file     Attends meetings of clubs or organizations: Not on file     Relationship status: Not on file    Intimate partner violence     Fear of current or ex partner: Not on file     Emotionally abused: Not on file     Physically abused: Not on file     Forced sexual activity: Not on file   Other Topics Concern    Not on file   Social History Narrative    Not on file           ROS:  [x] All negative/unchanged except if checked.  Explain positive(checked items) below:  [] Constitutional  [] Eyes  [] Ear/Nose/Mouth/Throat  [] Respiratory  [] CV  [] GI  []   [] Musculoskeletal  [] Skin/Breast  [] Neurological  [] Endocrine  [] Heme/Lymph  [] Allergic/Immunologic    Explanation:     MEDICATIONS:    Current Facility-Administered Medications:     ARIPiprazole (ABILIFY) tablet 20 mg, 20 mg, Oral, Daily, Kristin Reza, APRN - CNP, 20 mg at 12/26/20 1832    acetaminophen (TYLENOL) tablet 650 mg, 650 mg, Oral, Q6H PRN, Keesha Powell MD    magnesium hydroxide (MILK OF MAGNESIA) 400 MG/5ML suspension 30 mL, 30 mL, Oral, Daily PRN, Keesha Powell MD    aluminum & magnesium hydroxide-simethicone (MAALOX) 200-200-20 MG/5ML suspension 30 mL, 30 mL, Oral, PRN, Keesha Powell MD    hydrOXYzine (VISTARIL) capsule 50 mg, 50 mg, Oral, TID PRN, Keesha Powell MD    haloperidol lactate (HALDOL) injection 5 mg, 5 mg, Intramuscular, Q6H PRN **OR** haloperidol (HALDOL) tablet 5 mg, 5 mg, Oral, Q6H PRN, Keesha Powell MD    traZODone (DESYREL) tablet 50 mg, 50 mg, Oral, Nightly PRN, Kofi Nixon MD, 50 mg at 12/24/20 2047    nicotine polacrilex (NICORETTE) gum 2 mg, 2 mg, Oral, PRN, Kofi Nixon MD    divalproex (DEPAKOTE) DR tablet 500 mg, 500 mg, Oral, 2 times per day, Kofi Nixon MD, 500 mg at 12/26/20 4437    benztropine (COGENTIN) tablet 0.5 mg, 0.5 mg, Oral, BID, Kofi Nixon MD, 0.5 mg at 12/26/20 3806    melatonin tablet 3 mg, 3 mg, Oral, Nightly PRN, Kofi Nixon MD, 3 mg at 12/22/20 2202      Examination:  BP 94/67   Pulse 66   Temp 97.8 °F (36.6 °C) (Temporal)   Resp 14   Ht 5' 6\" (1.676 m)   Wt 141 lb 1 oz (64 kg)   SpO2 99%   BMI 22.77 kg/m²   Gait - steady  Medication side effects(SE): Denies    Mental Status Examination:    Level of consciousness:  within normal limits   Appearance:  fair grooming and fair hygiene  Behavior/Motor:  no abnormalities noted  Attitude toward examiner:  cooperative  Speech:  spontaneous, normal rate and normal volume   Mood: \" My mood is okay. \"  Affect: Mood incongruent flat and blunted  thought processes: Linear without flight of ideas loose associations  Thought content: With paranoid delusions endorses suicidal ideations denies homicidal ideations intent or plan denies auditory visual hallucinations ideations intent or plan cognition:  oriented to person, place, and time   Concentration fair  Insight Limited  Judgement Limited    ASSESSMENT:   Patient symptoms are:  [] Well controlled  [x] Improving  [] Worsening  [] No change      Diagnosis:   Principal Problem:    Schizophrenia, paranoid ([facility] [address]  Resolved Problems:    * No resolved hospital problems. *      LABS:    No results for input(s): WBC, HGB, PLT in the last 72 hours. No results for input(s): NA, K, CL, CO2, BUN, CREATININE, GLUCOSE in the last 72 hours. No results for input(s): BILITOT, ALKPHOS, AST, ALT in the last 72 hours.   Lab Results   Component Value Date    LABAMPH NOT DETECTED 12/22/2020    BARBSCNU NOT DETECTED

## 2020-12-26 NOTE — PROGRESS NOTES
Patient was isolative to room. Pleasant,cooperative. Verbalizes that he has fleeting suicidal thought with plan to quit eating. Contracts for safety. Denies HI or AV hallucinations. Observed watchful.paranoid, still believes some on is still following him. Anxiety 4 out of 10 do to pt. Cesar Virgen coming into his room x 2. Depression 3 out of 10 due to in hospital again. Verbalizes appetite has decreased and wonders if medications caused this. States that his sleep is good with the trazadone. Compliant with medications , but does not attend groups. Safety rounds continue.

## 2020-12-26 NOTE — PLAN OF CARE
Pt is stable and alert. Pt denies suicidal or homicidal ideations. Pt denies hallucinations. Pt cooperative, evasive, isolative to his room. Pt does not report a goal.  Will follow and monitor.

## 2020-12-27 PROCEDURE — 6370000000 HC RX 637 (ALT 250 FOR IP): Performed by: PSYCHIATRY & NEUROLOGY

## 2020-12-27 PROCEDURE — 99232 SBSQ HOSP IP/OBS MODERATE 35: CPT | Performed by: NURSE PRACTITIONER

## 2020-12-27 PROCEDURE — 1240000000 HC EMOTIONAL WELLNESS R&B

## 2020-12-27 PROCEDURE — 6370000000 HC RX 637 (ALT 250 FOR IP): Performed by: NURSE PRACTITIONER

## 2020-12-27 RX ADMIN — ARIPIPRAZOLE 20 MG: 10 TABLET ORAL at 08:14

## 2020-12-27 RX ADMIN — Medication 3 MG: at 22:17

## 2020-12-27 RX ADMIN — DIVALPROEX SODIUM 500 MG: 250 TABLET, DELAYED RELEASE ORAL at 21:03

## 2020-12-27 RX ADMIN — DIVALPROEX SODIUM 500 MG: 250 TABLET, DELAYED RELEASE ORAL at 08:14

## 2020-12-27 RX ADMIN — BENZTROPINE MESYLATE 0.5 MG: 0.5 TABLET ORAL at 08:14

## 2020-12-27 RX ADMIN — BENZTROPINE MESYLATE 0.5 MG: 0.5 TABLET ORAL at 21:03

## 2020-12-27 ASSESSMENT — PAIN SCALES - GENERAL
PAINLEVEL_OUTOF10: 0
PAINLEVEL_OUTOF10: 0

## 2020-12-27 NOTE — GROUP NOTE
Group Therapy Note    Date: 12/27/2020    Group Start Time: 1115  Group End Time: 1200  Group Topic: Cognitive Skills    [facility]    LATA Bowman        Group Therapy Note    Attendees: 17         Patient's Goal:  Pt will be able to identify principles of a healthy lifestyle and identify at least one thing they want to do more of or less of to improve health. Notes:  Pt was active listener  in class discussion. Status After Intervention:  Improved    Participation Level:  Active Listener and minimal    Participation Quality: Appropriate      Speech:  normal      Thought Process/Content: Logical, linear      Affective Functioning: flat    Mood: depressed      Level of consciousness:  Alert, Oriented x4 and Attentive      Response to Learning: Able to verbalize current knowledge/experience, and Progressing to goal      Endings: None Reported    Modes of Intervention: Education, Support, Socialization and Problem-solving      Discipline Responsible: /Counselor      Signature:  LATA Lawrence

## 2020-12-27 NOTE — PROGRESS NOTES
BEHAVIORAL HEALTH FOLLOW-UP NOTE     12/27/2020     Patient was seen and examined in person, Chart reviewed   Patient's case discussed with staff/team      Chief Complaint: \"Manic on the unit everyone stares at me\"    Interim History: Patient seen in his room. He remains paranoid and guarded he is not attending groups or socializing with peers she stays in his room all day he said he does not leave his room or count on the unit because people stare at him when he leaves he believes that one of the people out there on the unit could be someone who is apt to get him. When asked why someone will be out to get him he cannot say why he is remains very guarded and paranoid flat blunted affect. He is very suspicious he appears anxious and guarded. Continues report fleeting suicidal ideations to staff he denied SI on assessment today he denies auditory visual hallucinations      Appetite:   [x] Normal/Unchanged  [] Increased  [] Decreased      Sleep:       [x] Normal/Unchanged  [] Fair       [] Poor              Energy:    [x] Normal/Unchanged  [] Increased  [] Decreased        SI [x] Present  [] Absent    HI  []Present  [x] Absent     Aggression:  [] yes  [x] no    Patient is [x] able  [] unable to CONTRACT FOR SAFETY     PAST MEDICAL/PSYCHIATRIC HISTORY:   Past Medical History:   Diagnosis Date    Colitis     1996       FAMILY/SOCIAL HISTORY:  Family History   Problem Relation Age of Onset    Mental Illness Mother      Social History     Socioeconomic History    Marital status: Single     Spouse name: Not on file    Number of children: 0    Years of education: 8th grade     Highest education level: Not on file   Occupational History    Occupation: construction     Employer: UNEMPLOYED     Comment: last worked Nov 1.    Social Needs    Financial resource strain: Not on file    Food insecurity     Worry: Not on file     Inability: Not on file    Transportation needs     Medical: Not on file     Non-medical: Not on file   Tobacco Use    Smoking status: Current Every Day Smoker     Packs/day: 0.25     Years: 20.00     Pack years: 5.00     Types: Cigarettes     Start date: 1/8/2000    Smokeless tobacco: Never Used   Substance and Sexual Activity    Alcohol use: Yes     Alcohol/week: 1.0 - 2.0 standard drinks     Types: 1 - 2 Cans of beer per week     Comment: none in 2 weeks    Drug use: Never    Sexual activity: Not Currently   Lifestyle    Physical activity     Days per week: Not on file     Minutes per session: Not on file    Stress: Not on file   Relationships    Social connections     Talks on phone: Not on file     Gets together: Not on file     Attends Adventism service: Not on file     Active member of club or organization: Not on file     Attends meetings of clubs or organizations: Not on file     Relationship status: Not on file    Intimate partner violence     Fear of current or ex partner: Not on file     Emotionally abused: Not on file     Physically abused: Not on file     Forced sexual activity: Not on file   Other Topics Concern    Not on file   Social History Narrative    Not on file           ROS:  [x] All negative/unchanged except if checked.  Explain positive(checked items) below:  [] Constitutional  [] Eyes  [] Ear/Nose/Mouth/Throat  [] Respiratory  [] CV  [] GI  []   [] Musculoskeletal  [] Skin/Breast  [] Neurological  [] Endocrine  [] Heme/Lymph  [] Allergic/Immunologic    Explanation:     MEDICATIONS:    Current Facility-Administered Medications:     ARIPiprazole (ABILIFY) tablet 20 mg, 20 mg, Oral, Daily, SEGUN Rouse - CNP, 20 mg at 12/27/20 5248    acetaminophen (TYLENOL) tablet 650 mg, 650 mg, Oral, Q6H PRN, Alfredo Serrano MD    magnesium hydroxide (MILK OF MAGNESIA) 400 MG/5ML suspension 30 mL, 30 mL, Oral, Daily PRN, Alfredo Serrano MD    aluminum & magnesium hydroxide-simethicone (MAALOX) 493-248-64 MG/5ML suspension 30 mL, 30 mL, Oral, PRN, Alfredo Serrano MD    hydrOXYzine (VISTARIL) capsule 50 mg, 50 mg, Oral, TID PRN, Sabina Frankel MD    haloperidol lactate (HALDOL) injection 5 mg, 5 mg, Intramuscular, Q6H PRN **OR** haloperidol (HALDOL) tablet 5 mg, 5 mg, Oral, Q6H PRN, Sabina Frankel MD    traZODone (DESYREL) tablet 50 mg, 50 mg, Oral, Nightly PRN, Sabina Frankel MD, 50 mg at 12/24/20 2047    nicotine polacrilex (NICORETTE) gum 2 mg, 2 mg, Oral, PRN, Sabina Frankel MD    divalproex (DEPAKOTE) DR tablet 500 mg, 500 mg, Oral, 2 times per day, Sabina Frankel MD, 500 mg at 12/27/20 3320    benztropine (COGENTIN) tablet 0.5 mg, 0.5 mg, Oral, BID, Sabina Frankel MD, 0.5 mg at 12/27/20 1547    melatonin tablet 3 mg, 3 mg, Oral, Nightly PRN, Sabina Frankel MD, 3 mg at 12/26/20 2039      Examination:  BP (!) 90/54   Pulse 56   Temp 98.1 °F (36.7 °C)   Resp 12   Ht 5' 6\" (1.676 m)   Wt 141 lb 1 oz (64 kg)   SpO2 98%   BMI 22.77 kg/m²   Gait - steady  Medication side effects(SE): Denies    Mental Status Examination:    Level of consciousness:  within normal limits   Appearance:  fair grooming and fair hygiene  Behavior/Motor:  no abnormalities noted  Attitude toward examiner:  cooperative  Speech:  spontaneous, normal rate and normal volume   Mood: \" My mood is okay. \"  Affect: Mood incongruent flat and blunted  thought processes: Linear without flight of ideas loose associations  Thought content: With paranoid delusions endorses suicidal ideations denies homicidal ideations intent or plan denies auditory visual hallucinations ideations intent or plan cognition:  oriented to person, place, and time   Concentration fair  Insight Limited  Judgement Limited    ASSESSMENT:   Patient symptoms are:  [] Well controlled  [x] Improving  [] Worsening  [] No change      Diagnosis:   Principal Problem:    Schizophrenia, paranoid ([facility] [address]  Resolved Problems:    * No resolved hospital problems.  *      LABS:    No results for input(s): WBC, HGB, PLT in the last 72 hours. No results for input(s): NA, K, CL, CO2, BUN, CREATININE, GLUCOSE in the last 72 hours. No results for input(s): BILITOT, ALKPHOS, AST, ALT in the last 72 hours. Lab Results   Component Value Date    LABAMPH NOT DETECTED 12/22/2020    BARBSCNU NOT DETECTED 12/22/2020    LABBENZ NOT DETECTED 12/22/2020    LABMETH NOT DETECTED 12/22/2020    OPIATESCREENURINE NOT DETECTED 12/22/2020    PHENCYCLIDINESCREENURINE NOT DETECTED 12/22/2020    ETOH <10 12/22/2020     No results found for: TSH, FREET4  No results found for: LITHIUM  Lab Results   Component Value Date    VALPROATE 74 12/22/2020         Treatment Plan:  Reviewed current Medications with the patient. Risks, benefits, side effects, drug-to-drug interactions and alternatives to treatment were discussed. Collateral information:   CD evaluation  Encourage patient to attend group and other milieu activities.   Discharge planning discussed with the patient and treatment team.      Increase Abilify 20mg daily for psychosis  Continue Depakote 500 mg twice daily  Continue Cogentin 0.5 mg twice daily for side effects  staff confirmed patient received Abilify maintaina 400 mg IM on 11/29/2020          PSYCHOTHERAPY/COUNSELING:  [x] Therapeutic interview  [x] Supportive  [] CBT  [] Ongoing  [] Other    [x] Patient continues to need, on a daily basis, active treatment furnished directly by or requiring the supervision of inpatient psychiatric personnel      Anticipated Length of stay:            Electronically signed by SEGUN Kilpatrick CNP on 49/36/0652 at 8:58 AM

## 2020-12-27 NOTE — PROGRESS NOTES
Patient ws isolative to room. Denies HI, AV hallucinations. Verbalizes thoughts of harming self comes and goes,due to situation at crisis unit and the pt. next door to my room (Denies) coming into my room x 2. Anxiety 2 out of 10 due to sitting in this room. Depression is 4 ou of 10 because I'm here and not home. Appetite verbalizes is decreased not as good as was before. Verbalizes slept good last night with out taking sleeping pill, but I'm going to take tonight. Medications compliant . Safety rounds continue.

## 2020-12-27 NOTE — PLAN OF CARE
Patient A+Ox 4, denies SI, HI, and hallucinations. Isolates to room, calm pleasant demeanor. Rates anxiety 4/10, depression 2/10, and denies pain. Paranoid. Compliant with medication regimen, and does not attend group.  Good appetite, fluids encouraged  Problem: Risk of Harm:  Goal: Ability to remain free from injury will improve  Description: Ability to remain free from injury will improve  Outcome: Ongoing     Problem: Depressive Behavior With or Without Suicide Precautions:  Goal: Able to verbalize support systems  Description: Able to verbalize support systems  Outcome: Ongoing

## 2020-12-28 PROCEDURE — 99232 SBSQ HOSP IP/OBS MODERATE 35: CPT | Performed by: NURSE PRACTITIONER

## 2020-12-28 PROCEDURE — 1240000000 HC EMOTIONAL WELLNESS R&B

## 2020-12-28 PROCEDURE — 6370000000 HC RX 637 (ALT 250 FOR IP): Performed by: PSYCHIATRY & NEUROLOGY

## 2020-12-28 PROCEDURE — 6370000000 HC RX 637 (ALT 250 FOR IP): Performed by: NURSE PRACTITIONER

## 2020-12-28 RX ADMIN — Medication 3 MG: at 21:19

## 2020-12-28 RX ADMIN — DIVALPROEX SODIUM 500 MG: 250 TABLET, DELAYED RELEASE ORAL at 09:24

## 2020-12-28 RX ADMIN — DIVALPROEX SODIUM 500 MG: 250 TABLET, DELAYED RELEASE ORAL at 21:19

## 2020-12-28 RX ADMIN — BENZTROPINE MESYLATE 0.5 MG: 0.5 TABLET ORAL at 21:19

## 2020-12-28 RX ADMIN — BENZTROPINE MESYLATE 0.5 MG: 0.5 TABLET ORAL at 09:24

## 2020-12-28 RX ADMIN — ARIPIPRAZOLE 20 MG: 10 TABLET ORAL at 09:24

## 2020-12-28 ASSESSMENT — PAIN SCALES - GENERAL
PAINLEVEL_OUTOF10: 0
PAINLEVEL_OUTOF10: 0

## 2020-12-28 NOTE — PROGRESS NOTES
BEHAVIORAL HEALTH FOLLOW-UP NOTE     12/28/2020     Patient was seen and examined in person, Chart reviewed   Patient's case discussed with staff/team      Chief Complaint: \"I am feeling a little bit better. \"    Interim History: Patient seen during treatment team.  He states he is feeling a little bit better. When asked how he is feeling better he states because he was able to walk out of his room a little bit and attended a group. He still endorses paranoia but states it is decreasing some. He denies SI/HI intent or plan on assessment when asked when the last time he felt suicidal was he states yesterday. He denies any auditory visualizations or no overt overt signs of psychosis    Appetite:   [x] Normal/Unchanged  [] Increased  [] Decreased      Sleep:       [x] Normal/Unchanged  [] Fair       [] Poor              Energy:    [x] Normal/Unchanged  [] Increased  [] Decreased        SI [x] Present  [] Absent    HI  []Present  [x] Absent     Aggression:  [] yes  [x] no    Patient is [x] able  [] unable to CONTRACT FOR SAFETY     PAST MEDICAL/PSYCHIATRIC HISTORY:   Past Medical History:   Diagnosis Date    Colitis     1996       FAMILY/SOCIAL HISTORY:  Family History   Problem Relation Age of Onset    Mental Illness Mother      Social History     Socioeconomic History    Marital status: Single     Spouse name: Not on file    Number of children: 0    Years of education: 8th grade     Highest education level: Not on file   Occupational History    Occupation: construction     Employer: UNEMPLOYED     Comment: last worked 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    Social Needs    Financial resource strain: Not on file    Food insecurity     Worry: Not on file     Inability: Not on file    Transportation needs     Medical: Not on file     Non-medical: Not on file   Tobacco Use    Smoking status: Current Every Day Smoker     Packs/day: 0.25     Years: 20.00     Pack years: 5.00     Types: Cigarettes     Start date: 1/8/2000    Smokeless tobacco: Never Used   Substance and Sexual Activity    Alcohol use: Yes     Alcohol/week: 1.0 - 2.0 standard drinks     Types: 1 - 2 Cans of beer per week     Comment: none in 2 weeks    Drug use: Never    Sexual activity: Not Currently   Lifestyle    Physical activity     Days per week: Not on file     Minutes per session: Not on file    Stress: Not on file   Relationships    Social connections     Talks on phone: Not on file     Gets together: Not on file     Attends Mandaeism service: Not on file     Active member of club or organization: Not on file     Attends meetings of clubs or organizations: Not on file     Relationship status: Not on file    Intimate partner violence     Fear of current or ex partner: Not on file     Emotionally abused: Not on file     Physically abused: Not on file     Forced sexual activity: Not on file   Other Topics Concern    Not on file   Social History Narrative    Not on file           ROS:  [x] All negative/unchanged except if checked.  Explain positive(checked items) below:  [] Constitutional  [] Eyes  [] Ear/Nose/Mouth/Throat  [] Respiratory  [] CV  [] GI  []   [] Musculoskeletal  [] Skin/Breast  [] Neurological  [] Endocrine  [] Heme/Lymph  [] Allergic/Immunologic    Explanation:     MEDICATIONS:    Current Facility-Administered Medications:     ARIPiprazole (ABILIFY) tablet 20 mg, 20 mg, Oral, Daily, Parvizda Search Libia, APRN - CNP, 20 mg at 12/27/20 9658    acetaminophen (TYLENOL) tablet 650 mg, 650 mg, Oral, Q6H PRN, Tiny Dinh MD    magnesium hydroxide (MILK OF MAGNESIA) 400 MG/5ML suspension 30 mL, 30 mL, Oral, Daily PRN, Tiny Dinh MD    aluminum & magnesium hydroxide-simethicone (MAALOX) 200-200-20 MG/5ML suspension 30 mL, 30 mL, Oral, PRN, Tiny Dinh MD    hydrOXYzine (VISTARIL) capsule 50 mg, 50 mg, Oral, TID PRN, Tiny Dinh MD    haloperidol lactate (HALDOL) injection 5 mg, 5 mg, Intramuscular, Q6H PRN **OR** haloperidol 72 hours. Lab Results   Component Value Date    LABAMPH NOT DETECTED 12/22/2020    BARBSCNU NOT DETECTED 12/22/2020    LABBENZ NOT DETECTED 12/22/2020    LABMETH NOT DETECTED 12/22/2020    OPIATESCREENURINE NOT DETECTED 12/22/2020    PHENCYCLIDINESCREENURINE NOT DETECTED 12/22/2020    ETOH <10 12/22/2020     No results found for: TSH, FREET4  No results found for: LITHIUM  Lab Results   Component Value Date    VALPROATE 74 12/22/2020         Treatment Plan:  Reviewed current Medications with the patient. Risks, benefits, side effects, drug-to-drug interactions and alternatives to treatment were discussed. Collateral information:   CD evaluation  Encourage patient to attend group and other milieu activities.   Discharge planning discussed with the patient and treatment team.      Continue Abilify 20mg daily for psychosis  Continue Depakote 500 mg twice daily  Continue Cogentin 0.5 mg twice daily for side effects   Abilify maintaina 400 mg IM due on on 11/29/2020          PSYCHOTHERAPY/COUNSELING:  [x] Therapeutic interview  [x] Supportive  [] CBT  [] Ongoing  [] Other    [x] Patient continues to need, on a daily basis, active treatment furnished directly by or requiring the supervision of inpatient psychiatric personnel      Anticipated Length of stay:            Electronically signed by SEGUN Griffiths CNP on 84/50/6721 at 8:40 AM

## 2020-12-28 NOTE — BH NOTE
[address] [place] [address] Interdisciplinary Treatment Plan Note     Review Date & Time: 12-28-20  9:00 am    Patient was in treatment team.    Admission Type:   Admission Type: Involuntary    Reason for admission:  Reason for Admission: \"CAUSE I  SAID  I'D MIGHT AS WELL KILL MYSELF. MY MEDICINE WAS'T WORKING RIGHT AND TODAY I SAW THAT ANGELES WITH THE SCSSORS IN THE BATHROOM AT [facility] TODAY\"    Estimated Length of Stay Update:  2-4 days   Estimated Discharge Date Update: 2-4 days    PATIENT STRENGTHS:  Patient Strengths:Strengths: Communication, Connection to output provider, Positive Support, Medication Compliance, No significant Physical Illness, Social Skills  Patient Strengths and Limitations:Limitations: Multiple barriers to leisure interests, Tendency to isolate self, Difficulty problem solving/relies on others to help solve problems  Addictive Behavior:Addictive Behavior  In the past 3 months, have you felt or has someone told you that you have a problem with:  : None  Do you have a history of Chemical Use?: No  Do you have a history of Alcohol Use?: No  Do you have a history of Street Drug Abuse?: No  Histroy of Prescripton Drug Abuse?: No  Medical Problems:   Past Medical History:   Diagnosis Date    Colitis     1996       Risk:  Fall RiskTotal: 53  Merlin Scale Merlin Scale Score: 22  BVC Total: 0  Change in scores: 0. Changes to plan of Care: continue to monitor.      Status EXAM:   Status and Exam  Normal: No  Facial Expression: Flat, Worried  Affect: Blunt, Constricted  Level of Consciousness: Alert  Mood:Normal: No  Mood: Anxious, Suspicious  Motor Activity:Normal: No  Motor Activity: Decreased  Interview Behavior: Cooperative, Evasive  Preception: [place] to Person, [place] to Time, [place] to Place, [place] to Situation  Attention:Normal: No  Attention: Distractible  Thought Processes: Perseveration  Thought Content:Normal: No  Thought Content: Preoccupations, Paranoia  Hallucinations: None  Delusions: No  Delusions: Persecution  Memory:Normal: Yes  Memory: Poor Recent  Insight and Judgment: No  Insight and Judgment: Poor Judgment, Poor Insight, Unmotivated  Present Suicidal Ideation: No  Present Homicidal Ideation: No    Daily Assessment Last Entry:   Daily Sleep (WDL): Within Defined Limits         Patient Currently in Pain: Other (comment)([facility] pt. sleeping)  Daily Nutrition (WDL): Within Defined Limits    Patient Monitoring:  Frequency of Checks: 4 times per hour, close    Psychiatric Symptoms:   Depression Symptoms  Depression Symptoms: Isolative, Loss of interest  Anxiety Symptoms  Anxiety Symptoms: Generalized  Michelle Symptoms  Michelle Symptoms: No problems reported or observed. Psychosis Symptoms  Delusion Type: Paranoid    Suicide Risk CSSR-S:  1) Within the past month, have you wished you were dead or wished you could go to sleep and not wake up? : Yes  2) Have you actually had any thoughts of killing yourself? : Yes  3) Have you been thinking about how you might kill yourself? : No  5) Have you started to work out or worked out the details of how to kill yourself? Do you intend to carry out this plan? : No  6) Have you ever done anything, started to do anything, or prepared to do anything to end your life?: Yes  Change in Result: yes. Denies presently, though can be variable. Change in Plan of care: continue to monitor. EDUCATION:   Learner Progress Toward Treatment Goals: Reviewed results and recommendations of this team and Reviewed group plan and strategies    Method: Small group    Outcome: Verbalized understanding    PATIENT GOALS: pt does not report a goal.     PLAN/TREATMENT RECOMMENDATIONS UPDATE: Continue to monitor pt. GOALS UPDATE:  Time frame for Short-Term Goals: Daily re assessment.        Neftaly Choe RN

## 2020-12-28 NOTE — PLAN OF CARE
Pt is stable, without immediate distress. Pt denies suicidal or homicidal ideations. Pt has history of variable suicidal thoughts, though none at this time. Pt denies hallucinations. Pt does not report a goal presently. Will follow and monitor.

## 2020-12-28 NOTE — BH NOTE
Pt denies suicidal ideations. Pt denies homicidal ideations. Pt denies hallucinations. Pt cooperative, isolative, guarded, paranoid, quiet. Will follow and monitor.

## 2020-12-28 NOTE — GROUP NOTE
Group Therapy Note    Date: 12/28/2020    Group Start Time: 1255  Group End Time: 1300  Group Topic: Cognitive Skills    [facility]    JESSIE Greco LSW        Group Therapy Note    Attendees: 15         Patient's Goal:  Pt will be able to accurately identify positive affirmations that relate to them    Notes: Pt participated and made connections    Status After Intervention:  Improved    Participation Level:  Active Listener    Participation Quality: Appropriate, Attentive, Sharing and Supportive      Speech:  normal      Thought Process/Content: Logical      Affective Functioning: Congruent      Mood: depressed      Level of consciousness:  Alert and Oriented x4      Response to Learning: Able to verbalize current knowledge/experience      Endings: None Reported    Modes of Intervention: Education, Support, Socialization, Exploration, Clarifying, Problem-solving and Activity      Discipline Responsible: /Counselor      Signature:  JESSIE Greco LSW

## 2020-12-29 VITALS
TEMPERATURE: 99.3 F | HEIGHT: 66 IN | HEART RATE: 55 BPM | BODY MASS INDEX: 22.67 KG/M2 | SYSTOLIC BLOOD PRESSURE: 97 MMHG | WEIGHT: 141.06 LBS | DIASTOLIC BLOOD PRESSURE: 50 MMHG | RESPIRATION RATE: 16 BRPM | OXYGEN SATURATION: 98 %

## 2020-12-29 PROCEDURE — 6370000000 HC RX 637 (ALT 250 FOR IP): Performed by: PSYCHIATRY & NEUROLOGY

## 2020-12-29 PROCEDURE — 6370000000 HC RX 637 (ALT 250 FOR IP): Performed by: NURSE PRACTITIONER

## 2020-12-29 PROCEDURE — 99238 HOSP IP/OBS DSCHRG MGMT 30/<: CPT | Performed by: NURSE PRACTITIONER

## 2020-12-29 RX ORDER — DIVALPROEX SODIUM 500 MG/1
500 TABLET, DELAYED RELEASE ORAL EVERY 12 HOURS SCHEDULED
Qty: 60 TABLET | Refills: 0 | Status: SHIPPED | OUTPATIENT
Start: 2020-12-29 | End: 2021-01-28

## 2020-12-29 RX ORDER — LANOLIN ALCOHOL/MO/W.PET/CERES
3 CREAM (GRAM) TOPICAL NIGHTLY
Qty: 30 TABLET | Refills: 0 | Status: SHIPPED | OUTPATIENT
Start: 2020-12-29 | End: 2021-01-28

## 2020-12-29 RX ORDER — ARIPIPRAZOLE 20 MG/1
20 TABLET ORAL DAILY
Qty: 30 TABLET | Refills: 0 | Status: ON HOLD | OUTPATIENT
Start: 2020-12-29 | End: 2021-01-13 | Stop reason: HOSPADM

## 2020-12-29 RX ADMIN — DIVALPROEX SODIUM 500 MG: 250 TABLET, DELAYED RELEASE ORAL at 09:24

## 2020-12-29 RX ADMIN — ARIPIPRAZOLE 20 MG: 10 TABLET ORAL at 09:24

## 2020-12-29 RX ADMIN — BENZTROPINE MESYLATE 0.5 MG: 0.5 TABLET ORAL at 09:24

## 2020-12-29 ASSESSMENT — PAIN SCALES - GENERAL: PAINLEVEL_OUTOF10: 0

## 2020-12-29 NOTE — PLAN OF CARE
Pt is stable. Pt denies suicidal or homicidal ideations. Pt denies hallucinations. Pt cooperative, remains evasive and isolative to room. Pt remains suspicious, paranoid, guarded. Pt does not report a goal presently. Will monitor and follow.

## 2020-12-29 NOTE — PLAN OF CARE
Problem: Depressive Behavior With or Without Suicide Precautions:  Goal: Able to verbalize and/or display a decrease in depressive symptoms  Description: Able to verbalize and/or display a decrease in depressive symptoms  12/28/2020 1952 by Shantel Kaur RN  Outcome: Ongoing     Problem: Depressive Behavior With or Without Suicide Precautions:  Goal: Ability to disclose and discuss suicidal ideas will improve  Description: Ability to disclose and discuss suicidal ideas will improve  Outcome: Ongoing     Patient has been isolative to his room. Has a flat affect and depressed mood. Rates depression and anxiety each a 4/10. Denies suicidal/homicidal ideations and hallucinations at this time. Purposeful rounding continued.

## 2020-12-29 NOTE — PROGRESS NOTES
CLINICAL PHARMACY NOTE: MEDS TO [address] Select Patient?: No  Total # of Prescriptions Filled: 2   The following medications were delivered to the patient:  · DEPAKOTE  MG  · ABILIFY 20 MG  Total # of Interventions Completed: 3  Time Spent (min): 15    Additional Documentation:

## 2020-12-29 NOTE — DISCHARGE SUMMARY
DISCHARGE SUMMARY      Patient ID:  Fabien Marie  [id number]  38 y.o.  1985    Admit date: 12/22/2020    Discharge date and time: 12/29/2020    Admitting Physician: Tiny Dinh MD     Discharge Physician: Dr Cheyanne Salgado MD    Admission Diagnoses: [facility] ([facility] [F22]    Admission Condition: poor    Discharged Condition: stable    Admission Circumstance: Presented the ED  sent in for the crisis unit reporting paranoia and suicidal ideations      PAST MEDICAL/PSYCHIATRIC HISTORY:   Past Medical History:   Diagnosis Date    Colitis     1996       FAMILY/SOCIAL HISTORY:  Family History   Problem Relation Age of Onset    Mental Illness Mother      Social History     Socioeconomic History    Marital status: Single     Spouse name: Not on file    Number of children: 0    Years of education: 8th grade     Highest education level: Not on file   Occupational History    Occupation: construction     Employer: UNEMPLOYED     Comment: last worked Nov 1. Social Needs    Financial resource strain: Not on file    Food insecurity     Worry: Not on file     Inability: Not on file    Transportation needs     Medical: Not on file     Non-medical: Not on file   Tobacco Use    Smoking status: Current Every Day Smoker     Packs/day: 0.25     Years: 20.00     Pack years: 5.00     Types: Cigarettes     Start date: 1/8/2000    Smokeless tobacco: Never Used   Substance and Sexual Activity    Alcohol use:  Yes     Alcohol/week: 1.0 - 2.0 standard drinks     Types: 1 - 2 Cans of beer per week     Comment: none in 2 weeks    Drug use: Never    Sexual activity: Not Currently   Lifestyle    Physical activity     Days per week: Not on file     Minutes per session: Not on file    Stress: Not on file   Relationships    Social connections     Talks on phone: Not on file     Gets together: Not on file     Attends Cheondoism service: Not on file     Active member of club or organization: Not on file     Attends meetings of clubs or organizations: Not on file     Relationship status: Not on file    Intimate partner violence     Fear of current or ex partner: Not on file     Emotionally abused: Not on file     Physically abused: Not on file     Forced sexual activity: Not on file   Other Topics Concern    Not on file   Social History Narrative    Not on file       MEDICATIONS:    Current Facility-Administered Medications:     ARIPiprazole ER (ABILIFY MAINTENA) 400 mg, 400 mg, Intramuscular, Q30 Days, Sal Blood Dellick, APRN - CNP, 361 mg at 12/29/20 1340    ARIPiprazole (ABILIFY) tablet 20 mg, 20 mg, Oral, Daily, Sal Blood Dellick, APRN - CNP, 20 mg at 12/29/20 4711    acetaminophen (TYLENOL) tablet 650 mg, 650 mg, Oral, Q6H PRN, Madhu Becerra MD    magnesium hydroxide (MILK OF MAGNESIA) 400 MG/5ML suspension 30 mL, 30 mL, Oral, Daily PRN, Madhu Becerra MD    aluminum & magnesium hydroxide-simethicone (MAALOX) 200-200-20 MG/5ML suspension 30 mL, 30 mL, Oral, PRN, Madhu Becerra MD    hydrOXYzine (VISTARIL) capsule 50 mg, 50 mg, Oral, TID PRN, Madhu Becerra MD    haloperidol lactate (HALDOL) injection 5 mg, 5 mg, Intramuscular, Q6H PRN **OR** haloperidol (HALDOL) tablet 5 mg, 5 mg, Oral, Q6H PRN, Madhu Becerra MD    traZODone (DESYREL) tablet 50 mg, 50 mg, Oral, Nightly PRN, Madhu Becerra MD, 50 mg at 12/24/20 2047    nicotine polacrilex (NICORETTE) gum 2 mg, 2 mg, Oral, PRN, Madhu Becerra MD    divalproex (DEPAKOTE) DR tablet 500 mg, 500 mg, Oral, 2 times per day, Madhu Becerra MD, 500 mg at 12/29/20 7220    benztropine (COGENTIN) tablet 0.5 mg, 0.5 mg, Oral, BID, Madhu Becerra MD, 0.5 mg at 12/29/20 0924    melatonin tablet 3 mg, 3 mg, Oral, Nightly PRN, Madhu Becerra MD, 3 mg at 12/28/20 2119    Examination:  BP (!) 97/50   Pulse 55   Temp 99.3 °F (37.4 °C) (Temporal)   Resp 16   Ht 5' 6\" (1.676 m)   Wt 141 lb 1 oz (64 kg)   SpO2 98%   BMI 22.77 kg/m²   Gait - steady    HOSPITAL COURSE[de-identified]  Patient is admitted to unit on 12/22/2020 was closely monitored for paranoia and suicidal ideations. He was evaluated and was treated with Abilify 20 mg daily he was also continued on his Abilify maintain injection he received 400 mg IM on 12/29/2020 be due for his next injection on 1/28/2020. He is also continued on his Depakote 500 mg twice daily for mood stabilization Cogentin 0.5 mg twice daily for side effects. Medical management significant patient continued to improve on the floor. He started coming out of his room more he was out more visible in the milieu and attended some groups. He stated that he was no longer feeling paranoid and no longer believe that anyone was out to get him. He was no longer making any suicidal statements and never made any suicidal gestures while the unit.  obtain clarification patient's aunt who patient lives with who was able to voicing concerns that she had reported no access to any weapons. Treatment team felt the patient obtain the max and benefit from his hospitalization. He was set up with an outpatient mental health agency for outpatient follow-up services. At the time of discharge patient did not show any impulsive behavior. He vehemently denied any suicidal homicidal ideations intent or plan. He was eating well sleeping well there are no neurovegetative signs or symptoms of depression. He denied any auditory visual hallucinations were no overt overt signs psychosis. This patient no longer meets criteria for inpatient hospitalization.         No AVH or paranoid thoughts  No hopeless or worthless feeling  No active SI/HI  Appetite:  [x] Normal  [] Increased  [] Decreased    Sleep:       [x] Normal  [] Fair       [] Poor            Energy:    [x] Normal  [] Increased  [] Decreased     SI [] Present  [x] Absent  HI  []Present  [x] Absent   Aggression:  [] yes  [x] no  Patient is [x] able  [] unable to CONTRACT FOR SAFETY   Medication side effects(SE):  [x] None(Psych. Meds.) [] Other      Mental Status Examination on discharge:    Level of consciousness:  within normal limits   Appearance:  well-appearing  Behavior/Motor:  no abnormalities noted  Attitude toward examiner:  attentive and good eye contact  Speech:  spontaneous, normal rate and normal volume   Mood: \"My mood is good. \"  Affect: Appropriate and pleasant  Thought processes: Linear without flight of ideas loose associations  Thought content: Devoid of any auditory visual hallucinations delusions or other perceptual normalities. Denies SI/HI intent or plan  Cognition:  oriented to person, place, and time   Concentration intact  Memory intact  Insight good   Judgement fair   Fund of Knowledge adequate      ASSESSMENT:  Patient symptoms are:  [x] Well controlled  [x] Improving  [] Worsening  [] No change    Reason for more than one antipsychotic:  [x] N/A  [] 3 Failed Monotherapy attempts (Drugs tried:)  [] Crossover to a new antipsychotic  [] Taper to Monotherapy from Polypharmacy  [] Augmentation of clozapine therapy due to treatment resistance to single therapy    Diagnosis:  Principal Problem:    Schizophrenia, paranoid ([facility]  Resolved Problems:    * No resolved hospital problems. *      LABS:    No results for input(s): WBC, HGB, PLT in the last 72 hours. No results for input(s): NA, K, CL, CO2, BUN, CREATININE, GLUCOSE in the last 72 hours. No results for input(s): BILITOT, ALKPHOS, AST, ALT in the last 72 hours.   Lab Results   Component Value Date    LABAMPH NOT DETECTED 12/22/2020    BARBSCNU NOT DETECTED 12/22/2020    LABBENZ NOT DETECTED 12/22/2020    LABMETH NOT DETECTED 12/22/2020    OPIATESCREENURINE NOT DETECTED 12/22/2020    PHENCYCLIDINESCREENURINE NOT DETECTED 12/22/2020    ETOH <10 12/22/2020     No results found for: TSH, FREET4  No results found for: LITHIUM  Lab Results   Component Value Date    VALPROATE 74 12/22/2020       RISK ASSESSMENT AT DISCHARGE: Low risk for suicide and homicide. Treatment Plan:  Reviewed current Medications with the patient. Education provided on the complaince with treatment. Risks, benefits, side effects, drug-to-drug interactions and alternatives to treatment were discussed. Encourage patient to attend outpatient follow up appointment and therapy. Patient was advised to call the outpatient provider, visit the nearest ED or call 911 if symptoms are not manageable. Patient's family member was contacted prior to the discharge. Medication List      START taking these medications    nicotine polacrilex 2 MG gum  Commonly known as: NICORETTE  Take 1 each by mouth as needed for Smoking cessation        CHANGE how you take these medications    * ARIPiprazole  MG Prsy  Commonly known as: ABILIFY MAINTENA  What changed: Another medication with the same name was changed. Make sure you understand how and when to take each. * ARIPiprazole 20 MG tablet  Commonly known as: ABILIFY  Take 1 tablet by mouth daily  What changed:   · medication strength  · how much to take         * This list has 2 medication(s) that are the same as other medications prescribed for you. Read the directions carefully, and ask your doctor or other care provider to review them with you.             CONTINUE taking these medications    benztropine 0.5 MG tablet  Commonly known as: COGENTIN  Take 1 tablet by mouth 2 times daily     divalproex 500 MG DR tablet  Commonly known as: DEPAKOTE  Take 1 tablet by mouth every 12 hours     melatonin 3 MG Tabs tablet  Take 1 tablet by mouth nightly           Where to Get Your Medications      These medications were sent to [facility] [address]    Phone: [phone]   · ARIPiprazole 20 MG tablet  · divalproex 500 MG DR tablet  · melatonin 3 MG Tabs tablet     Information about where to get these medications is not yet available    Ask your nurse or doctor about these medications  · nicotine polacrilex 2 MG gum       Patient is counseled he must been compliant with all medications outpatient follow-up appointments    Patient is discharged home in stable condition    TIME SPEND - [address], DISCHARGE SUMMARY, MEDICATION RECONCILIATION AND FOLLOW UP CARE     Signed:  Cinthia Gamboa  [id number]  8:20 PM
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
## 2020-12-29 NOTE — BH NOTE
[facility]  Discharge Note    Pt discharged with followings belongings:   Dentures: None  Vision - Corrective Lenses: None  Hearing Aid: None  Jewelry: None  Body Piercings Removed: N/A  Clothing: Footwear, Pants, Shirt, Socks  Were All Patient Medications Collected?: Not Applicable  Other Valuables: Cell phone, Wallet(no cards no money)    Patient education on aftercare instructions: yes. Patient verbalize understanding of AVS: yes. Status EXAM upon discharge:  Status and Exam  Normal: No  Facial Expression: Flat, Sad  Affect: Blunt, Constricted  Level of Consciousness: Alert  Mood:Normal: No  Mood: Depressed, Sad  Motor Activity:Normal: No  Motor Activity: Decreased  Interview Behavior: Cooperative, Evasive  Preception: [place] to Person, [place] to Time, [place] to Place, [place] to Situation  Attention:Normal: No  Attention: Distractible  Thought Processes: Circumstantial  Thought Content:Normal: No  Thought Content: Poverty of Content  Hallucinations: None  Delusions: Yes  Delusions:  Other(See Comment)(paranoid)  Memory:Normal: Yes  Memory: Poor Recent  Insight and Judgment: No  Insight and Judgment: Poor Insight, Unmotivated  Present Suicidal Ideation: No  Present Homicidal Ideation: No      Metabolic Screening:    Lab Results   Component Value Date    LABA1C 5.5 12/09/2020       Lab Results   Component Value Date    CHOL 183 12/09/2020     Lab Results   Component Value Date    TRIG 69 12/09/2020     Lab Results   Component Value Date    HDL 71 12/09/2020     No components found for: [facility]UATION AND TREATMENT [place]  Lab Results   Component Value Date    LABVLDL 14 12/09/2020       Riccardo Hernandez RN

## 2021-01-05 ENCOUNTER — HOSPITAL ENCOUNTER (INPATIENT)
Age: 36
LOS: 10 days | Discharge: OTHER INSTITUTION | DRG: 885 | End: 2021-01-15
Attending: EMERGENCY MEDICINE | Admitting: PSYCHIATRY & NEUROLOGY
Payer: COMMERCIAL

## 2021-01-05 DIAGNOSIS — F20.0 PARANOID SCHIZOPHRENIA (HCC): Primary | ICD-10-CM

## 2021-01-05 DIAGNOSIS — R45.851 SUICIDAL IDEATION: ICD-10-CM

## 2021-01-05 LAB
ACETAMINOPHEN LEVEL: <5 MCG/ML (ref 10–30)
ALBUMIN SERPL-MCNC: 4.3 G/DL (ref 3.5–5.2)
ALP BLD-CCNC: 71 U/L (ref 40–129)
ALT SERPL-CCNC: 10 U/L (ref 0–40)
AMPHETAMINE SCREEN, URINE: NOT DETECTED
ANION GAP SERPL CALCULATED.3IONS-SCNC: 11 MMOL/L (ref 7–16)
AST SERPL-CCNC: 13 U/L (ref 0–39)
BACTERIA: NORMAL /HPF
BARBITURATE SCREEN URINE: NOT DETECTED
BASOPHILS ABSOLUTE: 0.04 E9/L (ref 0–0.2)
BASOPHILS RELATIVE PERCENT: 0.7 % (ref 0–2)
BENZODIAZEPINE SCREEN, URINE: NOT DETECTED
BILIRUB SERPL-MCNC: 0.3 MG/DL (ref 0–1.2)
BILIRUBIN URINE: NEGATIVE
BLOOD, URINE: NEGATIVE
BUN BLDV-MCNC: 14 MG/DL (ref 6–20)
CALCIUM SERPL-MCNC: 9.3 MG/DL (ref 8.6–10.2)
CANNABINOID SCREEN URINE: NOT DETECTED
CHLORIDE BLD-SCNC: 102 MMOL/L (ref 98–107)
CLARITY: CLEAR
CO2: 26 MMOL/L (ref 22–29)
COCAINE METABOLITE SCREEN URINE: NOT DETECTED
COLOR: YELLOW
CREAT SERPL-MCNC: 1.1 MG/DL (ref 0.7–1.2)
EKG ATRIAL RATE: 82 BPM
EKG P AXIS: 67 DEGREES
EKG P-R INTERVAL: 168 MS
EKG Q-T INTERVAL: 376 MS
EKG QRS DURATION: 90 MS
EKG QTC CALCULATION (BAZETT): 439 MS
EKG R AXIS: 74 DEGREES
EKG T AXIS: 45 DEGREES
EKG VENTRICULAR RATE: 82 BPM
EOSINOPHILS ABSOLUTE: 0.29 E9/L (ref 0.05–0.5)
EOSINOPHILS RELATIVE PERCENT: 5 % (ref 0–6)
ETHANOL: <10 MG/DL (ref 0–0.08)
FENTANYL SCREEN, URINE: NOT DETECTED
GFR AFRICAN AMERICAN: >60
GFR NON-AFRICAN AMERICAN: >60 ML/MIN/1.73
GLUCOSE BLD-MCNC: 134 MG/DL (ref 74–99)
GLUCOSE URINE: NEGATIVE MG/DL
HCT VFR BLD CALC: 41.7 % (ref 37–54)
HEMOGLOBIN: 14.3 G/DL (ref 12.5–16.5)
IMMATURE GRANULOCYTES #: 0.07 E9/L
IMMATURE GRANULOCYTES %: 1.2 % (ref 0–5)
KETONES, URINE: NEGATIVE MG/DL
LEUKOCYTE ESTERASE, URINE: NEGATIVE
LYMPHOCYTES ABSOLUTE: 1.95 E9/L (ref 1.5–4)
LYMPHOCYTES RELATIVE PERCENT: 33.7 % (ref 20–42)
Lab: NORMAL
MCH RBC QN AUTO: 31.5 PG (ref 26–35)
MCHC RBC AUTO-ENTMCNC: 34.3 % (ref 32–34.5)
MCV RBC AUTO: 91.9 FL (ref 80–99.9)
METHADONE SCREEN, URINE: NOT DETECTED
MONOCYTES ABSOLUTE: 0.61 E9/L (ref 0.1–0.95)
MONOCYTES RELATIVE PERCENT: 10.5 % (ref 2–12)
NEUTROPHILS ABSOLUTE: 2.83 E9/L (ref 1.8–7.3)
NEUTROPHILS RELATIVE PERCENT: 48.9 % (ref 43–80)
NITRITE, URINE: NEGATIVE
OPIATE SCREEN URINE: NOT DETECTED
OXYCODONE URINE: NOT DETECTED
PDW BLD-RTO: 12.4 FL (ref 11.5–15)
PH UA: 7 (ref 5–9)
PHENCYCLIDINE SCREEN URINE: NOT DETECTED
PLATELET # BLD: 152 E9/L (ref 130–450)
PMV BLD AUTO: 9.9 FL (ref 7–12)
POTASSIUM SERPL-SCNC: 3.8 MMOL/L (ref 3.5–5)
PROTEIN UA: NEGATIVE MG/DL
RBC # BLD: 4.54 E12/L (ref 3.8–5.8)
RBC UA: NORMAL /HPF (ref 0–2)
SALICYLATE, SERUM: <0.3 MG/DL (ref 0–30)
SARS-COV-2, NAAT: NOT DETECTED
SODIUM BLD-SCNC: 139 MMOL/L (ref 132–146)
SPECIFIC GRAVITY UA: 1.01 (ref 1–1.03)
TOTAL PROTEIN: 6.9 G/DL (ref 6.4–8.3)
TRICYCLIC ANTIDEPRESSANTS SCREEN SERUM: NEGATIVE NG/ML
UROBILINOGEN, URINE: 0.2 E.U./DL
VALPROIC ACID LEVEL: 45 MCG/ML (ref 50–100)
WBC # BLD: 5.8 E9/L (ref 4.5–11.5)
WBC UA: NORMAL /HPF (ref 0–5)

## 2021-01-05 PROCEDURE — 80053 COMPREHEN METABOLIC PANEL: CPT

## 2021-01-05 PROCEDURE — 99283 EMERGENCY DEPT VISIT LOW MDM: CPT

## 2021-01-05 PROCEDURE — 81001 URINALYSIS AUTO W/SCOPE: CPT

## 2021-01-05 PROCEDURE — 85025 COMPLETE CBC W/AUTO DIFF WBC: CPT

## 2021-01-05 PROCEDURE — 93005 ELECTROCARDIOGRAM TRACING: CPT | Performed by: PHYSICIAN ASSISTANT

## 2021-01-05 PROCEDURE — 80164 ASSAY DIPROPYLACETIC ACD TOT: CPT

## 2021-01-05 PROCEDURE — 80307 DRUG TEST PRSMV CHEM ANLYZR: CPT

## 2021-01-05 PROCEDURE — G0480 DRUG TEST DEF 1-7 CLASSES: HCPCS

## 2021-01-05 PROCEDURE — 1240000000 HC EMOTIONAL WELLNESS R&B

## 2021-01-05 PROCEDURE — U0002 COVID-19 LAB TEST NON-CDC: HCPCS

## 2021-01-05 RX ORDER — HALOPERIDOL 5 MG/ML
5 INJECTION INTRAMUSCULAR EVERY 6 HOURS PRN
Status: DISCONTINUED | OUTPATIENT
Start: 2021-01-05 | End: 2021-01-15 | Stop reason: HOSPADM

## 2021-01-05 RX ORDER — TRAZODONE HYDROCHLORIDE 50 MG/1
50 TABLET ORAL NIGHTLY PRN
Status: DISCONTINUED | OUTPATIENT
Start: 2021-01-05 | End: 2021-01-15 | Stop reason: HOSPADM

## 2021-01-05 RX ORDER — POLYETHYLENE GLYCOL 3350 17 G/17G
17 POWDER, FOR SOLUTION ORAL DAILY
COMMUNITY

## 2021-01-05 RX ORDER — ACETAMINOPHEN 325 MG/1
650 TABLET ORAL EVERY 6 HOURS PRN
Status: DISCONTINUED | OUTPATIENT
Start: 2021-01-05 | End: 2021-01-15 | Stop reason: HOSPADM

## 2021-01-05 RX ORDER — HALOPERIDOL 5 MG
5 TABLET ORAL EVERY 6 HOURS PRN
Status: DISCONTINUED | OUTPATIENT
Start: 2021-01-05 | End: 2021-01-15 | Stop reason: HOSPADM

## 2021-01-05 RX ORDER — MAGNESIUM HYDROXIDE/ALUMINUM HYDROXICE/SIMETHICONE 120; 1200; 1200 MG/30ML; MG/30ML; MG/30ML
30 SUSPENSION ORAL PRN
Status: DISCONTINUED | OUTPATIENT
Start: 2021-01-05 | End: 2021-01-15 | Stop reason: HOSPADM

## 2021-01-05 RX ORDER — HYDROXYZINE PAMOATE 50 MG/1
50 CAPSULE ORAL 3 TIMES DAILY PRN
Status: DISCONTINUED | OUTPATIENT
Start: 2021-01-05 | End: 2021-01-15 | Stop reason: HOSPADM

## 2021-01-05 ASSESSMENT — LIFESTYLE VARIABLES: HISTORY_ALCOHOL_USE: NO

## 2021-01-05 ASSESSMENT — PATIENT HEALTH QUESTIONNAIRE - PHQ9: SUM OF ALL RESPONSES TO PHQ QUESTIONS 1-9: 4

## 2021-01-05 ASSESSMENT — SLEEP AND FATIGUE QUESTIONNAIRES
DIFFICULTY ARISING: NO
DIFFICULTY FALLING ASLEEP: NO
AVERAGE NUMBER OF SLEEP HOURS: 7

## 2021-01-05 ASSESSMENT — PAIN SCALES - GENERAL: PAINLEVEL_OUTOF10: 0
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
## 2021-01-05 NOTE — ED PROVIDER NOTES
HPI:  1/5/21, Time: 11:02 AM ESTUARDO Castaneda is a 38 y.o. male presenting to the ED for suicidal ideation beginning today. Symptoms have been moderate in severity and constant since onset. No exacerbating or relieving factors. He has been taking his psychiatric medications without relief. He reports associated symptoms of paranoia. He states people have been following him around his at his home. He was brought in by family. Patient states that he has been having symptoms intermittently since November, but his suicidal ideation had resolved until today. He denies drug or alcohol use. He has no medical complaints. He denies recent illness, fevers, cough, loss of taste or smell, abdominal pain, nausea, vomiting, diarrhea, and known exposures to COVID-19. Review of Systems:   Pertinent positives and negatives are stated within HPI, all other systems reviewed and are negative.          --------------------------------------------- PAST HISTORY ---------------------------------------------  Past Medical History:  has a past medical history of Colitis. Past Surgical History:  has a past surgical history that includes fracture surgery; hip surgery (Right); and Colonoscopy. Social History:  reports that he has been smoking cigarettes. He started smoking about 21 years ago. He has a 5.00 pack-year smoking history. He has never used smokeless tobacco. He reports current alcohol use of about 1.0 - 2.0 standard drinks of alcohol per week. He reports that he does not use drugs. Family History: family history includes Mental Illness in his mother. The patients home medications have been reviewed. Allergies: Patient has no known allergies.     -------------------------------------------------- RESULTS -------------------------------------------------  All laboratory and radiology results have been personally reviewed by myself   LABS:  Results for orders placed or performed during the hospital encounter of 01/05/21   Comprehensive Metabolic Panel   Result Value Ref Range    Sodium 139 132 - 146 mmol/L    Potassium 3.8 3.5 - 5.0 mmol/L    Chloride 102 98 - 107 mmol/L    CO2 26 22 - 29 mmol/L    Anion Gap 11 7 - 16 mmol/L    Glucose 134 (H) 74 - 99 mg/dL    BUN 14 6 - 20 mg/dL    CREATININE 1.1 0.7 - 1.2 mg/dL    GFR Non-African American >60 >=60 mL/min/1.73    GFR African American >60     Calcium 9.3 8.6 - 10.2 mg/dL    Total Protein 6.9 6.4 - 8.3 g/dL    Alb 4.3 3.5 - 5.2 g/dL    Total Bilirubin 0.3 0.0 - 1.2 mg/dL    Alkaline Phosphatase 71 40 - 129 U/L    ALT 10 0 - 40 U/L    AST 13 0 - 39 U/L   CBC Auto Differential   Result Value Ref Range    WBC 5.8 4.5 - 11.5 E9/L    RBC 4.54 3.80 - 5.80 E12/L    Hemoglobin 14.3 12.5 - 16.5 g/dL    Hematocrit 41.7 37.0 - 54.0 %    MCV 91.9 80.0 - 99.9 fL    MCH 31.5 26.0 - 35.0 pg    MCHC 34.3 32.0 - 34.5 %    RDW 12.4 11.5 - 15.0 fL    Platelets 013 178 - 844 E9/L    MPV 9.9 7.0 - 12.0 fL    Neutrophils % 48.9 43.0 - 80.0 %    Immature Granulocytes % 1.2 0.0 - 5.0 %    Lymphocytes % 33.7 20.0 - 42.0 %    Monocytes % 10.5 2.0 - 12.0 %    Eosinophils % 5.0 0.0 - 6.0 %    Basophils % 0.7 0.0 - 2.0 %    Neutrophils Absolute 2.83 1.80 - 7.30 E9/L    Immature Granulocytes # 0.07 E9/L    Lymphocytes Absolute 1.95 1.50 - 4.00 E9/L    Monocytes Absolute 0.61 0.10 - 0.95 E9/L    Eosinophils Absolute 0.29 0.05 - 0.50 E9/L    Basophils Absolute 0.04 0.00 - 0.20 E9/L   Serum Drug Screen   Result Value Ref Range    Ethanol Lvl <10 mg/dL    Acetaminophen Level <5.0 (L) 10.0 - 01.2 mcg/mL    Salicylate, Serum <5.6 0.0 - 30.0 mg/dL    TCA Scrn NEGATIVE Cutoff:300 ng/mL   Urine Drug Screen   Result Value Ref Range    Amphetamine Screen, Urine NOT DETECTED Negative <1000 ng/mL    Barbiturate Screen, Ur NOT DETECTED Negative < 200 ng/mL    Benzodiazepine Screen, Urine NOT DETECTED Negative < 200 ng/mL    Cannabinoid Scrn, Ur NOT DETECTED Negative < 50ng/mL    Cocaine Metabolite Screen, Urine NOT DETECTED Negative < 300 ng/mL    Opiate Scrn, Ur NOT DETECTED Negative < 300ng/mL    PCP Screen, Urine NOT DETECTED Negative < 25 ng/mL    Methadone Screen, Urine NOT DETECTED Negative <300 ng/mL    Oxycodone Urine NOT DETECTED Negative <100 ng/mL    FENTANYL SCREEN, URINE NOT DETECTED Negative <1 ng/mL    Drug Screen Comment: see below    Urinalysis with Microscopic   Result Value Ref Range    Color, UA Yellow Straw/Yellow    Clarity, UA Clear Clear    Glucose, Ur Negative Negative mg/dL    Bilirubin Urine Negative Negative    Ketones, Urine Negative Negative mg/dL    Specific Gravity, UA 1.010 1.005 - 1.030    Blood, Urine Negative Negative    pH, UA 7.0 5.0 - 9.0    Protein, UA Negative Negative mg/dL    Urobilinogen, Urine 0.2 <2.0 E.U./dL    Nitrite, Urine Negative Negative    Leukocyte Esterase, Urine Negative Negative    WBC, UA 0-1 0 - 5 /HPF    RBC, UA NONE 0 - 2 /HPF    Bacteria, UA NONE SEEN None Seen /HPF   COVID-19   Result Value Ref Range    SARS-CoV-2, NAAT Not Detected Not Detected   EKG 12 Lead   Result Value Ref Range    Ventricular Rate 82 BPM    Atrial Rate 82 BPM    P-R Interval 168 ms    QRS Duration 90 ms    Q-T Interval 376 ms    QTc Calculation (Bazett) 439 ms    P Axis 67 degrees    R Axis 74 degrees    T Axis 45 degrees       RADIOLOGY:  Interpreted by Radiologist.  No orders to display       ------------------------- NURSING NOTES AND VITALS REVIEWED ---------------------------   The nursing notes within the ED encounter and vital signs as below have been reviewed.    /79   Pulse 98   Temp 98.2 °F (36.8 °C)   Resp 16   Wt 150 lb (68 kg)   SpO2 98%   BMI 24.21 kg/m²   Oxygen Saturation Interpretation: Normal      ---------------------------------------------------PHYSICAL EXAM--------------------------------------      Constitutional/General: Alert and oriented x3, well appearing, non toxic in NAD  Head: Normocephalic and atraumatic  Eyes: PERRL, EOMI  Mouth: Oropharynx clear, handling secretions, no trismus  Neck: Supple, full ROM,   Pulmonary: Lungs clear to auscultation bilaterally, no wheezes, rales, or rhonchi. Not in respiratory distress  Cardiovascular:  Regular rate and rhythm, no murmurs, gallops, or rubs. 2+ distal pulses  Abdomen: Soft, non tender, non distended,   Extremities: Moves all extremities x 4. Warm and well perfused  Skin: warm and dry without rash  Neurologic: GCS 15, no focal motor or sensory deficits   Psych: Flat affect. + Paranoia. + SI with plan. No HI.      ------------------------------ ED COURSE/MEDICAL DECISION MAKING----------------------  Medications - No data to display    Medical Decision Making/ED COURSE:   Patient is a 67-year-old male with history of paranoid schizophrenia presenting with paranoia and suicidal ideation. In the ED, patient was hemodynamically stable and afebrile. Patient has no medical complaints and an unremarkable medical screening exam.  Screening labs obtained. I reviewed and interpreted labs. No acute findings. Patient medically clear for psych eval. Patient pink slipped. Patient remained hemodynamically stable throughout ED course. ED Course as of Jan 05 1245   Tue Jan 05, 2021   1052 EKG: This EKG is signed and interpreted by me. Rate: 82  Rhythm: Sinus  Interpretation: Normal sinus rhythm, normal LA interval, normal QRS, normal QT interval, no acute ST or T wave changes  Comparison: no previous EKG available        [JA]      ED Course User Index  [JA] Aravind Hicks MD         Counseling: The emergency provider has spoken with the patient and discussed todays results, in addition to providing specific details for the plan of care and counseling regarding the diagnosis and prognosis. Questions are answered at this time and they are agreeable with the plan.      --------------------------------- IMPRESSION AND DISPOSITION ---------------------------------    IMPRESSION  1.  Paranoid schizophrenia ([facility] [address]    2. Suicidal ideation        DISPOSITION  Disposition: Admit to mental health unit - medically cleared for admission  Patient condition is stable      NOTE: This report was transcribed using voice recognition software.  Every effort was made to ensure accuracy; however, inadvertent computerized transcription errors may be present    ITay MD, am the primary provider of this record       Tay Chan MD  01/05/21 0976

## 2021-01-06 PROCEDURE — 6370000000 HC RX 637 (ALT 250 FOR IP): Performed by: PSYCHIATRY & NEUROLOGY

## 2021-01-06 PROCEDURE — 6370000000 HC RX 637 (ALT 250 FOR IP): Performed by: NURSE PRACTITIONER

## 2021-01-06 PROCEDURE — 1240000000 HC EMOTIONAL WELLNESS R&B

## 2021-01-06 PROCEDURE — 99222 1ST HOSP IP/OBS MODERATE 55: CPT | Performed by: NURSE PRACTITIONER

## 2021-01-06 RX ORDER — POLYETHYLENE GLYCOL 3350 17 G/17G
17 POWDER, FOR SOLUTION ORAL DAILY
Status: DISCONTINUED | OUTPATIENT
Start: 2021-01-06 | End: 2021-01-15 | Stop reason: HOSPADM

## 2021-01-06 RX ORDER — DIVALPROEX SODIUM 500 MG/1
500 TABLET, DELAYED RELEASE ORAL EVERY 12 HOURS SCHEDULED
Status: DISCONTINUED | OUTPATIENT
Start: 2021-01-06 | End: 2021-01-15 | Stop reason: HOSPADM

## 2021-01-06 RX ORDER — LANOLIN ALCOHOL/MO/W.PET/CERES
3 CREAM (GRAM) TOPICAL NIGHTLY
Status: DISCONTINUED | OUTPATIENT
Start: 2021-01-06 | End: 2021-01-15 | Stop reason: HOSPADM

## 2021-01-06 RX ORDER — BENZTROPINE MESYLATE 0.5 MG/1
0.5 TABLET ORAL 2 TIMES DAILY
Status: DISCONTINUED | OUTPATIENT
Start: 2021-01-06 | End: 2021-01-15 | Stop reason: HOSPADM

## 2021-01-06 RX ORDER — POLYETHYLENE GLYCOL 3350 17 G/17G
17 POWDER, FOR SOLUTION ORAL DAILY
Status: DISCONTINUED | OUTPATIENT
Start: 2021-01-06 | End: 2021-01-06 | Stop reason: CLARIF

## 2021-01-06 RX ADMIN — ARIPIPRAZOLE 25 MG: 15 TABLET ORAL at 10:38

## 2021-01-06 RX ADMIN — DIVALPROEX SODIUM 500 MG: 250 TABLET, DELAYED RELEASE ORAL at 10:38

## 2021-01-06 RX ADMIN — TRAZODONE HYDROCHLORIDE 50 MG: 50 TABLET ORAL at 21:17

## 2021-01-06 RX ADMIN — DIVALPROEX SODIUM 500 MG: 250 TABLET, DELAYED RELEASE ORAL at 21:17

## 2021-01-06 RX ADMIN — Medication 3 MG: at 21:17

## 2021-01-06 RX ADMIN — BENZTROPINE MESYLATE 0.5 MG: 1 TABLET ORAL at 21:17

## 2021-01-06 RX ADMIN — BENZTROPINE MESYLATE 0.5 MG: 1 TABLET ORAL at 10:38

## 2021-01-06 RX ADMIN — POLYETHYLENE GLYCOL 3350 17 G: 17 POWDER, FOR SOLUTION ORAL at 10:38

## 2021-01-06 ASSESSMENT — LIFESTYLE VARIABLES: HISTORY_ALCOHOL_USE: NO

## 2021-01-06 ASSESSMENT — SLEEP AND FATIGUE QUESTIONNAIRES
DO YOU HAVE DIFFICULTY SLEEPING: NO
DIFFICULTY FALLING ASLEEP: NO
DIFFICULTY ARISING: NO

## 2021-01-06 NOTE — CARE COORDINATION
Biopsychosocial Assessment Note    Social work met with patient to complete the biopsychosocial assessment and CSSR-S. Mental Status Exam: Pt presents himself as a 38year old male . Pt was alert/oriented x4, and observed to have a sad/flat affect. Pt reports feeling depressed and suicidial with a plan to starve himself. Pt reports he will refuse all meals beginning today ( 1/6/21). Pt also reports to having visual hallucinations. Pt denies HI, auditory hallucinations and delusions. Chief Complaint: Pt reports to feeling suicidal with a plan to stave himself. Patient Report: Pt reports this is his 3rd psychiatric hospitalization. He reports prior to his admission, he did not receive any outpatient mental health treatment. Pt reports to receiving new psychiatric medications in November 1, 2020. Pt states since he has been taking these medications, he has been followed by a group of men and has been having suicidal thought. He reports these suicidal thoughts \" come and go\" . Pt reports days prior to his admission, he was at [address] [place] [address] and was being followed by a group of men. Pt also reports these group of men follow him everywhere and were present with him during this assessment. Pt reports he does not have any motivation to live and plans to starve himself to death. He identifies his aunt and uncle as being very supportive people.        Gender  [x] Male [] Female [] Transgender  [] Other    Sexual Orientation    [x] Heterosexual [] Homosexual [] Bisexual [] Other    Suicidal Ideation  [x] Reports [] Denies    Homicidal Ideation  [] Reports [x] Denies      Hallucinations/Delusions (Specify type)  [x] Reports  Current visual hallucinations  [] Denies     Substance Use/Alcohol Use/Addiction  [] Reports [x] Denies     Trauma History  [] Reports [x] Denies     Collateral Contact (KENDALL signed)  Name:  Josse Thomas   Relationship: Aunt and Uncle   Number:  [phone] or [phone]    Collateral

## 2021-01-06 NOTE — CARE COORDINATION
QAMAR spoke with the pt's aunt Amos Stewart for collateral. SW was informed the pt was adopted into their family. SW was informed he was raised [place]ian[place] for 20 years and made the decision to leave the community. After leaving the community, her family welcomed him into their family. Amos Stewart states she and her  know minimal information about the pt's biological family.

## 2021-01-06 NOTE — H&P
Department of Psychiatry  History and Physical - Adult     CHIEF COMPLAINT:  \"My neighbors were taking down their friend so they could get to me. \"    Patient was seen after discussing with the treatment team and reviewing the chart    CIRCUMSTANCES OF ADMISSION: Presented the ED brought in by family for suicidal ideations and paranoia believing the people are falling around at the [employer] and he was fearful for his life    HISTORY OF PRESENT ILLNESS:      The patient is a 42 y. o. male with significant past history of colitis and schizophrenia with 3 recent inpatient psychiatric hospitalizations when in [place] for 3 weeks and 2 recently here at [facility] where he was recently discharged on 12/29/20 presented the ED brought in by family for suicidal ideations and paranoia believing the people are falling around at the [employer] and he was fearful for his life. In the ED his urine drug screen is negative his blood alcohol level is negative he is medically cleared admitted to [address] [place] [address] adult psychiatric unit for further psychiatric assessment stabilization and treatment. Upon assessment today patient is paranoid and fearful. He states that his neighbors were taking down a fence and he believes they were doing this so that they could get to him. He reported to nursing staff that he was at the [address] [place] [address] with his uncle and he believed the people there wanted to kill him. He has reported suicidal ideation with a plan to starve himself. He is extremely paranoid guarded reports suicidal ideations, denies any homicidal ideations intent or plan denies any auditory visual hallucinations. Past psychiatric history:  Patient was hospitalized at a psychiatric facility in [place] for 3 weeks.  Most recently here at [facility]  2 times in December and was recently discharged on 12/29/2020.  He is currently on Abilify 10 mg.  Abilify Maintena 400 mg and received his last injection on 12/29/2020.      Legal history:  Probation in 2008 for [address]     Substance use history:  Patient endorses that up until November he was drinking 3-4 beers nightly but stopped drinking in November. [facility] denies any other substance use.     Personal family and social history:  Per the patient he grew up in [facility] his parents raised him, he has 1 brother, and 1 sister,.  The patient states that he graduated high school he did not go to college after high school.  He has never been  and he has no children. [facility] states that he has worked odd jobs, and worked in construction and maintenance. [facility] states that he lost his job 1-1/2 months ago after an argument with his employer. Keshawn Aggarwal currently not employed. Past Medical History:        Diagnosis Date    Colitis     1996       Medications Prior to Admission:   Medications Prior to Admission: polyethylene glycol (GLYCOLAX) 17 GM/SCOOP powder, Take 17 g by mouth daily Patient takes 1/4 teaspoon daily  ARIPiprazole (ABILIFY) 20 MG tablet, Take 1 tablet by mouth daily  divalproex (DEPAKOTE) 500 MG DR tablet, Take 1 tablet by mouth every 12 hours  benztropine (COGENTIN) 0.5 MG tablet, Take 1 tablet by mouth 2 times daily  ARIPiprazole ER (ABILIFY MAINTENA) 400 MG PRSY, Inject 400 mg into the muscle every 30 days Last dose 11/29/20  melatonin 3 MG TABS tablet, Take 1 tablet by mouth nightly  nicotine polacrilex (NICORETTE) 2 MG gum, Take 1 each by mouth as needed for Smoking cessation    Past Surgical History:        Procedure Laterality Date    COLONOSCOPY      FRACTURE SURGERY      HIP SURGERY Right     age 10 fractured hip        Allergies:   Patient has no known allergies. Family History  Family History   Problem Relation Age of Onset    Mental Illness Mother              EXAMINATION:    REVIEW OF SYSTEMS:    ROS:  [x] All negative/unchanged except if checked.  Explain positive(checked items) below:  [] Constitutional  [] Eyes  [] Ear/Nose/Mouth/Throat  [] Respiratory  [] CV  [] GI  []   [] Musculoskeletal  [] Skin/Breast  [] Neurological  [] Endocrine  [] Heme/Lymph  [] Allergic/Immunologic    Explanation:     Vitals:  /68   Pulse 102   Temp 98 °F (36.7 °C) (Oral)   Resp 14   Ht 5' 6\" (1.676 m)   Wt 143 lb (64.9 kg)   SpO2 97%   BMI 23.08 kg/m²      Physical Examination:   Head: x  Atraumatic: x normocephalic  Skin and Mucosa        Moist x  Dry   Pale  x Normal   Neck:  Thyroid  Palpable   x  Not palpable   venus distention   adenopathy   Chest: x Clear   Rhonchi     Wheezing   CV:  xS1   xS2    xNo murmer   Abdomen:  x  Soft    Tender    Viceromegaly   Extremities:  x No Edema     Edema     Cranial Nerves Examination:   CN II:   xPupils are reactive to light  Pupils are non reactive to light  CN III, IV, VI:  xNo eye deviation    No diplopia or ptosis   CN V:    xFacial Sensation is intact     Facial Sensation is not intact   CN IIIV:   x Hearing is normal to rubbing fingers   CN IX, X:     xNormal gag reflex and phonation   CN XI:   xShoulder shrug and neck rotation is normal  CNXII:    xTongue is midline no deviation or atrophy    Mental Status Examination:    Level of consciousness:  within normal limits   Appearance:  seated in bed  Behavior/Motor:  no abnormalities noted  Attitude toward examiner:  evasive and guarded  Speech:  slow, monotone and poverty of speech   Mood: anxious  Affect:  mood congruent  Thought processes: Linear without flight of ideas loose associations  Thought content: Endorses paranoia denies auditory visualizations endorses suicidal ideations denies homicidal ideations intent or plan  Cognition:  oriented to person, place, and time   Concentration poor  Memory intact  Insight poor   Judgement poor   Fund of Knowledge adequate      DIAGNOSIS:  Acute exacerbation of chronic paranoid schizophrenia        LABS: REVIEWED TODAY:  Recent Labs     01/05/21  1044   WBC 5.8   HGB 14.3        Recent Labs     01/05/21  1044    K 3.8      CO2 26   BUN 14   CREATININE 1.1   GLUCOSE 134*     Recent Labs     01/05/21  1044   BILITOT 0.3   ALKPHOS 71   AST 13   ALT 10     Lab Results   Component Value Date    LABAMPH NOT DETECTED 01/05/2021    BARBSCNU NOT DETECTED 01/05/2021    LABBENZ NOT DETECTED 01/05/2021    LABMETH NOT DETECTED 01/05/2021    OPIATESCREENURINE NOT DETECTED 01/05/2021    PHENCYCLIDINESCREENURINE NOT DETECTED 01/05/2021    ETOH <10 01/05/2021     No results found for: TSH, FREET4  No results found for: LITHIUM  Lab Results   Component Value Date    VALPROATE 45 (L) 01/05/2021     Lab Results   Component Value Date    VALPROATE 45 01/05/2021         Radiology Xr Abdomen (kub) (single Ap View)    Result Date: 12/15/2020  EXAMINATION: ONE SUPINE XRAY VIEW(S) OF THE ABDOMEN 12/15/2020 2:39 pm COMPARISON: None. HISTORY: ORDERING SYSTEM PROVIDED HISTORY: constipation TECHNOLOGIST PROVIDED HISTORY: Reason for exam:->constipation What reading provider will be dictating this exam?->CRC FINDINGS: Moderate fecal retention is seen throughout the colon indicating constipation. No radiographic evidence of bowel obstruction. No nephrolithiasis is seen. The visualized bones are intact without fracture or focal lesion. Constipation. No radiographic evidence of mechanical obstruction        TREATMENT PLAN:    Risk Management: Based on the diagnosis and assessment biopsychosocial treatment model was presented to the patient and was given the opportunity to ask any question. The patient was agreeable to the plan and all the patient's questions were answered to the patient's satisfaction. I discussed with the patient the risk, benefit, alternative and common side effects for the proposed medication treatment. The patient is consenting to this treatment. Collateral Information:  Will obtain collateral information from the family or friends.   Will obtain medical records as appropriate from out patient providers  Will consult the hospitalist for a physical exam to rule out any co-morbid physical condition. Discussed with Dr. Alannah Thompson  Increase Abilify to 25 mg daily for psychosis  Continue Depakote 5 mg twice daily for mood  Continue melatonin 3 mg at bedtime for sleep  Continue Cogentin 0.5 mg twice daily for side effects  Continue Abilify maintain injection patient has received 400 mg IM on 12/29/2020        Prn Haldol 5mg and Vistaril 50mg q6hr for extreme agitation. Trazodone as ordered for insomnia  Vistaril as ordered for anxiety      Psychotherapy:   Encourage participation in milieu and group therapy  Individual therapy as needed        Behavioral Services  Medicare Certification      Admission Day 1  I certify that this patient's inpatient psychiatric hospital admission is medically necessary for:     (1) treatment which could reasonably be expected to improve this patient's condition, or     (2) diagnostic study or its equivalent.        Electronically signed by SEGUN Barragan CNP on 7/1/3970 at 10:01 AM

## 2021-01-06 NOTE — PLAN OF CARE
[facility]  Initial Interdisciplinary Treatment Plan NOTE    Review Date & Time: 1/6/2021 1000    Patient was not in treatment team    Admission Type:   Admission Type: Involuntary    Reason for admission:  Reason for Admission: My anxiety kicked up and I was scared. Someone is following me I fear. Estimated Length of Stay Update:  7 DAYS  Estimated Discharge Date Update: MONDAY    PATIENT STRENGTHS:  Patient Strengths Strengths: Communication, Positive Support  Patient Strengths and Limitations:Limitations: Hopeless about future  Addictive Behavior:Addictive Behavior  In the past 3 months, have you felt or has someone told you that you have a problem with:  : None  Do you have a history of Chemical Use?: No  Do you have a history of Alcohol Use?: No  Do you have a history of Street Drug Abuse?: No  Histroy of Prescripton Drug Abuse?: No  Medical Problems:  Past Medical History:   Diagnosis Date    Colitis     1996       EDUCATION:   Learner Progress Toward Treatment Goals: Reviewed results and recommendations of this team    Method: Individual    Outcome: Needs reinforcement    PATIENT GOALS: \"NO\"    PLAN/TREATMENT RECOMMENDATIONS UPDATE: DIETARY CONSULT, SUICIDE RISK ASSESSMENT. ASSESS FOR PSYCHOSIS. ASSESS INTAKE. ENCOURAGE MEDICATION COMPLIANCE AND ASSESS RESPONSE, ENCOURAGE TO BE OUT OF ROOM AND TO ATTEND GROUPS. DISCHARGE PLANNING AND FOLLOW UP    GOALS UPDATE:   Time frame for Short-Term Goals:  1 WEEK    PT. IN ROOM ALL MORNING,AFFECT FLAT,POOR EYE CONTACT. MOOD DEPRESSED, SUSPICIOUS. DENIES PRESENT HALLUCINATIONS BUT DOES REPORT BELIEF THAT \"PEOPLE ARE FOLLWING ME'\"AND STATED FEELINGS OF IMPENDING HARM. PT. REPORTS SUICIDAL IDEATIONS WITH PLAN TO STARVE SELF TO DEATH. PT. HAS BEEN REFUSING MEALS. PT. DOES TAKE MEDICATIONS. DECLINES GROUPS.      Debra De Santiago RN

## 2021-01-06 NOTE — PLAN OF CARE
Problem: Altered Mood, Depressive Behavior:  Goal: Ability to disclose and discuss suicidal ideas will improve  Description: Ability to disclose and discuss suicidal ideas will improve  Outcome: Ongoing  PT. REPORTS CONTINUES SUICIDAL IDEATIONS. PT. DOES REPORT WANTS TO STARVE SELF TO DEATH. Goal: Absence of self-harm  Description: Absence of self-harm  Outcome: Met This Shift  PT. REFUSED BREAKFAST.

## 2021-01-06 NOTE — PLAN OF CARE
Problem: Altered Mood, Depressive Behavior:  Goal: Able to verbalize acceptance of life and situations over which he or she has no control  Description: Able to verbalize acceptance of life and situations over which he or she has no control  Outcome: Ongoing  Goal: Able to verbalize and/or display a decrease in depressive symptoms  Description: Able to verbalize and/or display a decrease in depressive symptoms  Outcome: Ongoing  Goal: Ability to disclose and discuss suicidal ideas will improve  Description: Ability to disclose and discuss suicidal ideas will improve  1/6/2021 1709 by Nessa Joyce RN  Outcome: Ongoing  1/6/2021 0927 by Shelby Smallwood RN  Outcome: Ongoing  Goal: Able to verbalize support systems  Description: Able to verbalize support systems  Outcome: Ongoing  Goal: Absence of self-harm  Description: Absence of self-harm  1/6/2021 1709 by Nessa Joyce RN  Outcome: Met This Shift  1/6/2021 0927 by Shelby Smallwood RN  Outcome: Met This Shift     Pt refusing to eat dinner. Pt evasive and turning away from this nurse when speaking to patient. Will continue to monitor.

## 2021-01-07 PROCEDURE — 6370000000 HC RX 637 (ALT 250 FOR IP): Performed by: NURSE PRACTITIONER

## 2021-01-07 PROCEDURE — 99232 SBSQ HOSP IP/OBS MODERATE 35: CPT | Performed by: NURSE PRACTITIONER

## 2021-01-07 PROCEDURE — 6370000000 HC RX 637 (ALT 250 FOR IP): Performed by: PSYCHIATRY & NEUROLOGY

## 2021-01-07 PROCEDURE — 1240000000 HC EMOTIONAL WELLNESS R&B

## 2021-01-07 RX ADMIN — DIVALPROEX SODIUM 500 MG: 250 TABLET, DELAYED RELEASE ORAL at 21:10

## 2021-01-07 RX ADMIN — ARIPIPRAZOLE 25 MG: 15 TABLET ORAL at 08:13

## 2021-01-07 RX ADMIN — DIVALPROEX SODIUM 500 MG: 250 TABLET, DELAYED RELEASE ORAL at 08:14

## 2021-01-07 RX ADMIN — Medication 3 MG: at 21:10

## 2021-01-07 RX ADMIN — BENZTROPINE MESYLATE 0.5 MG: 1 TABLET ORAL at 08:14

## 2021-01-07 RX ADMIN — TRAZODONE HYDROCHLORIDE 50 MG: 50 TABLET ORAL at 21:11

## 2021-01-07 RX ADMIN — BENZTROPINE MESYLATE 0.5 MG: 1 TABLET ORAL at 21:11

## 2021-01-07 RX ADMIN — POLYETHYLENE GLYCOL 3350 17 G: 17 POWDER, FOR SOLUTION ORAL at 08:14

## 2021-01-07 ASSESSMENT — PAIN SCALES - GENERAL: PAINLEVEL_OUTOF10: 0

## 2021-01-07 NOTE — PLAN OF CARE
Problem: Altered Mood, Depressive Behavior:  Goal: Able to verbalize and/or display a decrease in depressive symptoms  Description: Able to verbalize and/or display a decrease in depressive symptoms  1/7/2021 0641 by Ronna Hughes RN  Outcome: Ongoing  MOOD DEPRESSED,REPORTS A DECREASE IN SUICIDAL THOUGHTS. Problem: Altered Mood, Depressive Behavior:  Goal: Ability to disclose and discuss suicidal ideas will improve  Description: Ability to disclose and discuss suicidal ideas will improve  1/7/2021 0839 by Debra De Santiago RN  Outcome: Ongoing  SUICIDAL THOUGHTS ARE DECREASING PER PT. REPORT. DID EAT MEAL. 1/7/2021 0641 by Ronna Hughes RN  Outcome: Ongoing     Problem: Altered Mood, Depressive Behavior:  Goal: Absence of self-harm  Description: Absence of self-harm  1/7/2021 0839 by Debra De Santiago RN  Outcome: Met This Shift  APPETITE IS IMPROVING.  MENU COMPLETED BY PT.   1/7/2021 0641 by Ronna Hughes RN  Outcome: Met This Shift

## 2021-01-07 NOTE — PROGRESS NOTES
In room except for meals. Pt. flat,depressed, avoids eye contact. Superficial with answers to assessment questions. Avoidant, suspicious. Pt. reports a decrease in suicidal thoughts. Deneis intent to self harm at this time. Groups and milieu encouraged. Pt. Remains medication compliant.

## 2021-01-07 NOTE — PROGRESS NOTES
BEHAVIORAL HEALTH FOLLOW-UP NOTE     1/7/2021     Patient was seen and examined in person, Chart reviewed   Patient's case discussed with staff/team    Chief Complaint: \" People are following me around the [employer]. \"    Interim History: Patient seen in his room remains guarded and paranoid. Continues to believe the people are following him around the [employer]. He is isolative not attending groups and socializing with peers. He shows very poor limited insight and judgment to hospitalization need for treatment. Continues to believe that people are out to get him including people at the [employer] and also his neighbors who are taking down a fence. Denies SI/HI intent or plan      Appetite:   [x] Normal/Unchanged  [] Increased  [] Decreased      Sleep:       [x] Normal/Unchanged  [] Fair       [] Poor              Energy:    [x] Normal/Unchanged  [] Increased  [] Decreased        SI [] Present  [x] Absent    HI  []Present  [x] Absent     Aggression:  [] yes  [x] no    Patient is [x] able  [] unable to CONTRACT FOR SAFETY     PAST MEDICAL/PSYCHIATRIC HISTORY:   Past Medical History:   Diagnosis Date    Colitis     1996       FAMILY/SOCIAL HISTORY:  Family History   Problem Relation Age of Onset    Mental Illness Mother      Social History     Socioeconomic History    Marital status: Single     Spouse name: Not on file    Number of children: 0    Years of education: 8th grade     Highest education level: Not on file   Occupational History    Occupation: construction     Employer: UNEMPLOYED     Comment: last worked Nov 1.    Social Needs    Financial resource strain: Not on file    Food insecurity     Worry: Not on file     Inability: Not on file    Transportation needs     Medical: Not on file     Non-medical: Not on file   Tobacco Use    Smoking status: Current Every Day Smoker     Packs/day: 0.25     Years: 20.00     Pack years: 5.00     Types: Cigarettes     Start date: 1/8/2000    Smokeless tobacco: Never Used   Substance and Sexual Activity    Alcohol use: Yes     Alcohol/week: 1.0 - 2.0 standard drinks     Types: 1 - 2 Cans of beer per week     Comment: none since Novemeber 2020    Drug use: Never    Sexual activity: Not Currently   Lifestyle    Physical activity     Days per week: Not on file     Minutes per session: Not on file    Stress: Not on file   Relationships    Social connections     Talks on phone: Not on file     Gets together: Not on file     Attends Mormon service: Not on file     Active member of club or organization: Not on file     Attends meetings of clubs or organizations: Not on file     Relationship status: Not on file    Intimate partner violence     Fear of current or ex partner: Not on file     Emotionally abused: Not on file     Physically abused: Not on file     Forced sexual activity: Not on file   Other Topics Concern    Not on file   Social History Narrative    Not on file           ROS:  [x] All negative/unchanged except if checked.  Explain positive(checked items) below:  [] Constitutional  [] Eyes  [] Ear/Nose/Mouth/Throat  [] Respiratory  [] CV  [] GI  []   [] Musculoskeletal  [] Skin/Breast  [] Neurological  [] Endocrine  [] Heme/Lymph  [] Allergic/Immunologic    Explanation:     MEDICATIONS:    Current Facility-Administered Medications:     ARIPiprazole (ABILIFY) tablet 25 mg, 25 mg, Oral, Daily, SEGUN Fournier - CNP, 25 mg at 01/07/21 0813    divalproex (DEPAKOTE) DR tablet 500 mg, 500 mg, Oral, 2 times per day, Jb Brown APRN - CNP, 260 mg at 01/07/21 0814    melatonin tablet 3 mg, 3 mg, Oral, Nightly, SEGUN Ortiz - CNP, 3 mg at 01/06/21 2117    benztropine (COGENTIN) tablet 0.5 mg, 0.5 mg, Oral, BID, SEGUN Ortiz - CNP, 0.5 mg at 01/07/21 0814    polyethylene glycol (GLYCOLAX) packet 17 g, 17 g, Oral, Daily, Lauren Reza APRELI - CNP, 17 g at 01/07/21 0814    acetaminophen (TYLENOL) tablet 650 mg, 650 mg, Oral, Q6H PRN, Deborah Echeverria MD    magnesium hydroxide (MILK OF MAGNESIA) 400 MG/5ML suspension 30 mL, 30 mL, Oral, Daily PRN, Deborah Echeverria MD    aluminum & magnesium hydroxide-simethicone (MAALOX) 200-200-20 MG/5ML suspension 30 mL, 30 mL, Oral, PRN, Deborah Echeverria MD    hydrOXYzine (VISTARIL) capsule 50 mg, 50 mg, Oral, TID PRN, Deborah Echeverria MD    haloperidol lactate (HALDOL) injection 5 mg, 5 mg, Intramuscular, Q6H PRN **OR** haloperidol (HALDOL) tablet 5 mg, 5 mg, Oral, Q6H PRN, Deborah Echeverria MD    traZODone (DESYREL) tablet 50 mg, 50 mg, Oral, Nightly PRN, Deborah Echeverria MD, 50 mg at 01/06/21 2117      Examination:  /63   Pulse 71   Temp 98.6 °F (37 °C) (Temporal)   Resp 14   Ht 5' 6\" (1.676 m)   Wt 143 lb (64.9 kg)   SpO2 97%   BMI 23.08 kg/m²   Gait - steady  Medication side effects(SE): No    Mental Status Examination:    Level of consciousness:  within normal limits   Appearance:  poor grooming and poor hygiene  Behavior/Motor:  no abnormalities noted  Attitude toward examiner:  evasive and guarded  Speech:  slow and monotone   Mood: Constricted  Affect:  blunted and flat  Thought processes:  illogical   Thought content: Paranoia denies auditory visualizations denies SI/HI intent or plan  Cognition:  oriented to person, place, and time   Concentration poor  Insight poor   Judgement poor     ASSESSMENT:   Patient symptoms are:  [] Well controlled  [] Improving  [] Worsening  [x] No change      Diagnosis:   Principal Problem:    Schizophrenia, paranoid ([facility] [address]  Resolved Problems:    * No resolved hospital problems.  *      LABS:    Recent Labs     01/05/21  1044   WBC 5.8   HGB 14.3        Recent Labs     01/05/21  1044      K 3.8      CO2 26   BUN 14   CREATININE 1.1   GLUCOSE 134*     Recent Labs     01/05/21  1044   BILITOT 0.3   ALKPHOS 71   AST 13   ALT 10     Lab Results   Component Value Date    LABAMPH NOT DETECTED 01/05/2021    BARBSCNU NOT DETECTED

## 2021-01-08 PROCEDURE — 6370000000 HC RX 637 (ALT 250 FOR IP): Performed by: NURSE PRACTITIONER

## 2021-01-08 PROCEDURE — 99232 SBSQ HOSP IP/OBS MODERATE 35: CPT | Performed by: NURSE PRACTITIONER

## 2021-01-08 PROCEDURE — 1240000000 HC EMOTIONAL WELLNESS R&B

## 2021-01-08 PROCEDURE — 6370000000 HC RX 637 (ALT 250 FOR IP): Performed by: PSYCHIATRY & NEUROLOGY

## 2021-01-08 RX ADMIN — ARIPIPRAZOLE 25 MG: 15 TABLET ORAL at 09:00

## 2021-01-08 RX ADMIN — POLYETHYLENE GLYCOL 3350 17 G: 17 POWDER, FOR SOLUTION ORAL at 09:00

## 2021-01-08 RX ADMIN — DIVALPROEX SODIUM 500 MG: 250 TABLET, DELAYED RELEASE ORAL at 09:00

## 2021-01-08 RX ADMIN — BENZTROPINE MESYLATE 0.5 MG: 1 TABLET ORAL at 21:16

## 2021-01-08 RX ADMIN — DIVALPROEX SODIUM 500 MG: 250 TABLET, DELAYED RELEASE ORAL at 21:15

## 2021-01-08 RX ADMIN — Medication 3 MG: at 21:15

## 2021-01-08 RX ADMIN — BENZTROPINE MESYLATE 0.5 MG: 1 TABLET ORAL at 09:00

## 2021-01-08 RX ADMIN — TRAZODONE HYDROCHLORIDE 50 MG: 50 TABLET ORAL at 21:15

## 2021-01-08 ASSESSMENT — PAIN SCALES - GENERAL: PAINLEVEL_OUTOF10: 0

## 2021-01-08 NOTE — PLAN OF CARE
Poor Judgment, Poor Insight, Unmotivated, Unrealistic  Present Suicidal Ideation: Yes  Present Homicidal Ideation: No    Daily Assessment Last Entry:   Daily Sleep (WDL): Within Defined Limits         Patient Currently in Pain: No  Daily Nutrition (WDL): Exceptions to WDL  Appetite Change: Decreased  Barriers to Nutrition: None  Level of Assistance: Needs encouragement    Patient Monitoring:  Frequency of Checks: 4 times per hour, close    Psychiatric Symptoms:   Depression Symptoms  Depression Symptoms: Isolative, Loss of interest  Anxiety Symptoms  Anxiety Symptoms: Generalized  Michelle Symptoms  Michelle Symptoms: Poor judgment     Psychosis Symptoms  Hallucination Type: No problems reported or observed. Delusion Type: No problems reported or observed. Suicide Risk CSSR-S:  1) Within the past month, have you wished you were dead or wished you could go to sleep and not wake up? : Yes  2) Have you actually had any thoughts of killing yourself? : Yes  3) Have you been thinking about how you might kill yourself? : Yes  5) Have you started to work out or worked out the details of how to kill yourself?  Do you intend to carry out this plan? : No  6) Have you ever done anything, started to do anything, or prepared to do anything to end your life?: Yes  Change in Result: PT. REPORTS FLEETING SUICIDAL IDEATIONS WITH NO PLAN Change in Plan of care: Filipe Dumas.:   Learner Progress Toward Treatment Goals: Reviewed results and recommendations of this team    Method: Small group    Outcome: Verbalized understanding    PATIENT GOALS: \"NO\"    PLAN/TREATMENT RECOMMENDATIONS UPDATE: CONTINUE TO ASSESS FOR SUICIDE RISK, ENCOURAGE GROUPS, SUPPORTIVE CARE, DISCHARGE PLANNING AND FOLLOW UP    GOALS UPDATE: PLAN FOR POTENTIAL DISCHARGE ON Monday TO [facility],WITH MEDICATIONS AND FOLLOW UP    Time frame for Short-Term Goals:        Rishi Reyes RN

## 2021-01-08 NOTE — PROGRESS NOTES
UP TO EAT,RETURNS TO ROOM. DID SHOWER, GROUPS ENCOURAGED. LESS FLAT. STILL WITH MINIMAL EYE CONTACT. IN CONTROL. NO SELF HARM. REPORTS A DECREASE IN SUICIDAL IDEATIONS WITH NO CURRENT PLAN OR INTENT. REMAINS GUARDED,HESITANT. PT. VOICED NO DELUSIONS ON A.M. ASSESSMENT. DID ATTEND TREATMENT TEAM, BUT NO GROUPS EXCEPT COMMUNITY MEETING.

## 2021-01-08 NOTE — CARE COORDINATION
SW spoke with pt in regards to discharge planning. SW explained to pt, her aunt's safety concerns once he is set for discharge. Pt was  informed his aunt/uncle  does  not feel it is safe for him  to return to their home and believes a group home is in his best interest.     Pt stated he does not wish to reside in a group home and prefers to live with his aunt and uncle. Pt stated he intends to speak with his aunt/uncle  in hopes she reconsiders their  decision. SW also spoke with pt about residing at [address] [place] [address] once he is discharge. The pt stated he was previously placed at the Crisis Unit \" and it was bad there\"     During this discussion SW was informed pt does not have an income. QAMAR  spoke with pt about applying for SSI benefits Pt stated he would be interested with applying for SSI.  SW informed pt she would further discuss the application process with him when she returns to on 1/11/21

## 2021-01-08 NOTE — PROGRESS NOTES
BEHAVIORAL HEALTH FOLLOW-UP NOTE     1/8/2021     Patient was seen and examined in person, Chart reviewed   Patient's case discussed with staff/team    Chief Complaint: Flat and blunted no eye contact appears visibly paranoid    Interim History: Patient seen during treatment team.  His affect is very flat and blunted he offers very little conversation. He appears visibly paranoid. Staff reports he continues to isolate in his room eating in his room he apparently did come out for 1 group however he continues to have significant paranoid symptoms. He denies SI/HI intent or plan denies auditory visual hallucinations    Appetite:   [x] Normal/Unchanged  [] Increased  [] Decreased      Sleep:       [x] Normal/Unchanged  [] Fair       [] Poor              Energy:    [x] Normal/Unchanged  [] Increased  [] Decreased        SI [] Present  [x] Absent    HI  []Present  [x] Absent     Aggression:  [] yes  [x] no    Patient is [x] able  [] unable to CONTRACT FOR SAFETY     PAST MEDICAL/PSYCHIATRIC HISTORY:   Past Medical History:   Diagnosis Date    Colitis     1996       FAMILY/SOCIAL HISTORY:  Family History   Problem Relation Age of Onset    Mental Illness Mother      Social History     Socioeconomic History    Marital status: Single     Spouse name: Not on file    Number of children: 0    Years of education: 8th grade     Highest education level: Not on file   Occupational History    Occupation: construction     Employer: UNEMPLOYED     Comment: last worked Nov 1. Social Needs    Financial resource strain: Not on file    Food insecurity     Worry: Not on file     Inability: Not on file    Transportation needs     Medical: Not on file     Non-medical: Not on file   Tobacco Use    Smoking status: Current Every Day Smoker     Packs/day: 0.25     Years: 20.00     Pack years: 5.00     Types: Cigarettes     Start date: 1/8/2000    Smokeless tobacco: Never Used   Substance and Sexual Activity    Alcohol use:  Yes Alcohol/week: 1.0 - 2.0 standard drinks     Types: 1 - 2 Cans of beer per week     Comment: none since Novemeber 2020    Drug use: Never    Sexual activity: Not Currently   Lifestyle    Physical activity     Days per week: Not on file     Minutes per session: Not on file    Stress: Not on file   Relationships    Social connections     Talks on phone: Not on file     Gets together: Not on file     Attends Latter day service: Not on file     Active member of club or organization: Not on file     Attends meetings of clubs or organizations: Not on file     Relationship status: Not on file    Intimate partner violence     Fear of current or ex partner: Not on file     Emotionally abused: Not on file     Physically abused: Not on file     Forced sexual activity: Not on file   Other Topics Concern    Not on file   Social History Narrative    Not on file           ROS:  [x] All negative/unchanged except if checked.  Explain positive(checked items) below:  [] Constitutional  [] Eyes  [] Ear/Nose/Mouth/Throat  [] Respiratory  [] CV  [] GI  []   [] Musculoskeletal  [] Skin/Breast  [] Neurological  [] Endocrine  [] Heme/Lymph  [] Allergic/Immunologic    Explanation:     MEDICATIONS:    Current Facility-Administered Medications:     ARIPiprazole (ABILIFY) tablet 25 mg, 25 mg, Oral, Daily, SEGUN Fournier - CNP, 25 mg at 01/08/21 0900    divalproex (DEPAKOTE) DR tablet 500 mg, 500 mg, Oral, 2 times per day, SEGUN Winter - CNP, 956 mg at 01/08/21 0900    melatonin tablet 3 mg, 3 mg, Oral, Nightly, Che Reza APRELI - CNP, 3 mg at 01/07/21 2110    benztropine (COGENTIN) tablet 0.5 mg, 0.5 mg, Oral, BID, Nagi Reza APRN - CNP, 0.5 mg at 01/08/21 0900    polyethylene glycol (GLYCOLAX) packet 17 g, 17 g, Oral, Daily, Che Reza APRN - CNP, 17 g at 01/08/21 0900    acetaminophen (TYLENOL) tablet 650 mg, 650 mg, Oral, Q6H PRN, Paulette Easton MD    magnesium hydroxide (MILK OF MAGNESIA) 400 MG/5ML suspension 30 mL, 30 mL, Oral, Daily PRN, La Harmon MD    aluminum & magnesium hydroxide-simethicone (MAALOX) 200-200-20 MG/5ML suspension 30 mL, 30 mL, Oral, PRN, La Harmon MD    hydrOXYzine (VISTARIL) capsule 50 mg, 50 mg, Oral, TID PRN, La Harmon MD    haloperidol lactate (HALDOL) injection 5 mg, 5 mg, Intramuscular, Q6H PRN **OR** haloperidol (HALDOL) tablet 5 mg, 5 mg, Oral, Q6H PRN, La Harmon MD    traZODone (DESYREL) tablet 50 mg, 50 mg, Oral, Nightly PRN, La Harmon MD, 50 mg at 01/07/21 2111      Examination:  BP (!) 104/59   Pulse 66   Temp 97.3 °F (36.3 °C) (Temporal)   Resp 15   Ht 5' 6\" (1.676 m)   Wt 143 lb (64.9 kg)   SpO2 97%   BMI 23.08 kg/m²   Gait - steady  Medication side effects(SE): No    Mental Status Examination:    Level of consciousness:  within normal limits   Appearance:  poor grooming and poor hygiene  Behavior/Motor:  no abnormalities noted  Attitude toward examiner:  evasive and guarded  Speech:  slow and monotone   Mood: Constricted  Affect:  blunted and flat  Thought processes:  illogical   Thought content: Paranoia denies auditory visualizations denies SI/HI intent or plan  Cognition:  oriented to person, place, and time   Concentration poor  Insight poor   Judgement poor     ASSESSMENT:   Patient symptoms are:  [] Well controlled  [] Improving  [] Worsening  [x] No change      Diagnosis:   Principal Problem:    [facility]  Resolved Problems:    * No resolved hospital problems.  *      LABS:    Recent Labs     01/05/21  1044   WBC 5.8   HGB 14.3        Recent Labs     01/05/21  1044      K 3.8      CO2 26   BUN 14   CREATININE 1.1   GLUCOSE 134*     Recent Labs     01/05/21  1044   BILITOT 0.3   ALKPHOS 71   AST 13   ALT 10     Lab Results   Component Value Date    LABAMPH NOT DETECTED 01/05/2021    BARBSCNU NOT DETECTED 01/05/2021    LABBENZ NOT DETECTED 01/05/2021    LABMETH NOT DETECTED 01/05/2021    OPIATESCREENURINE NOT DETECTED 01/05/2021    PHENCYCLIDINESCREENURINE NOT DETECTED 01/05/2021    ETOH <10 01/05/2021     No results found for: TSH, FREET4  No results found for: LITHIUM  Lab Results   Component Value Date    VALPROATE 45 (L) 01/05/2021           Treatment Plan:  Reviewed current Medications with the patient. Risks, benefits, side effects, drug-to-drug interactions and alternatives to treatment were discussed. Collateral information:   CD evaluation  Encourage patient to attend group and other milieu activities.   Discharge planning discussed with the patient and treatment team.    Continue Abilify 25 mg daily for psychosis  Continue Abilify maintain a 400 mg IM q. 28 days  Continue Depakote 5 mg twice daily for mood stabilization  Continue Cogentin 0.5 mg twice daily for side effects  Continue melatonin 3 mg at bedtime for sleep      PSYCHOTHERAPY/COUNSELING:  [x] Therapeutic interview  [x] Supportive  [] CBT  [] Ongoing  [] Other    [x] Patient continues to need, on a daily basis, active treatment furnished directly by or requiring the supervision of inpatient psychiatric personnel      Anticipated Length of stay: 5 to 7 days based on stability            Electronically signed by SEGUN Rivera CNP on 5/0/2858 at 9:30 AM

## 2021-01-08 NOTE — PLAN OF CARE
Problem: Altered Mood, Depressive Behavior:  Goal: Ability to disclose and discuss suicidal ideas will improve  Description: Ability to disclose and discuss suicidal ideas will improve  Outcome: Met This Shift  PT. REPORTS FLEETING SUICIDAL IDEATIONS. PT. IS EATING MEALS.       Problem: Altered Mood, Depressive Behavior:  Goal: Absence of self-harm  Description: Absence of self-harm  1/8/2021 0909 by Lauryn Monterroso RN  Outcome: Met This Shift  NO SELF HARM.   1/8/2021 0621 by Maria Luisa Gold RN  Outcome: Met This Shift

## 2021-01-08 NOTE — PLAN OF CARE
Patient reports fleeting SI, without plan or intent. Denies HI and hallucinations. Patient is flat, evasive, and has poor eye contact. Isolative to room this evening. No behavioral issues or PRNs administered. No complaints or concerns verbalized at this time. Will continue to offer support.         Problem: Altered Mood, Depressive Behavior:  Goal: Absence of self-harm  Description: Absence of self-harm  1/7/2021 1905 by Roy Alpers, RN  Outcome: Met This Shift     Problem: Suicide risk  Goal: Provide patient with safe environment  Description: Provide patient with safe environment  Outcome: Met This Shift     Problem: Altered Mood, Depressive Behavior:  Goal: Able to verbalize acceptance of life and situations over which he or she has no control  Description: Able to verbalize acceptance of life and situations over which he or she has no control  Outcome: Not Met This Shift     Problem: Altered Mood, Depressive Behavior:  Goal: Able to verbalize and/or display a decrease in depressive symptoms  Description: Able to verbalize and/or display a decrease in depressive symptoms  1/7/2021 1905 by Roy Alpers, RN  Outcome: Not Met This Shift           Electronically signed by Roy Alpers, RN on 1/7/2021 at 7:06 PM

## 2021-01-08 NOTE — PLAN OF CARE
Reports fleeting SI without plan or intent and contracts for safety on the unit. Denies HI and hallucinations. Evasive during assessment and eye contact is poor. Patient is in room reading but was out on the unit for dinner. Cooperative with staff. No behavioral issues or PRNs administered. No complaints or concerns verbalized at this time. Will continue to offer support.         Problem: Altered Mood, Depressive Behavior:  Goal: Absence of self-harm  Description: Absence of self-harm  1/8/2021 1648 by Ammy Mahoney RN  Outcome: Met This Shift     Problem: Suicide risk  Goal: Provide patient with safe environment  Description: Provide patient with safe environment  Outcome: Met This Shift     Problem: Altered Mood, Depressive Behavior:  Goal: Able to verbalize acceptance of life and situations over which he or she has no control  Description: Able to verbalize acceptance of life and situations over which he or she has no control  Outcome: Not Met This Shift     Problem: Altered Mood, Depressive Behavior:  Goal: Able to verbalize and/or display a decrease in depressive symptoms  Description: Able to verbalize and/or display a decrease in depressive symptoms  1/8/2021 1648 by Ammy Mahoney RN  Outcome: Not Met This Shift     Problem: Altered Mood, Depressive Behavior:  Goal: Ability to disclose and discuss suicidal ideas will improve  Description: Ability to disclose and discuss suicidal ideas will improve  1/8/2021 1648 by Ammy Mahoney RN  Outcome: Not Met This Shift         Electronically signed by Ammy Mahoney RN on 1/8/2021 at 4:50 PM

## 2021-01-09 PROCEDURE — 1240000000 HC EMOTIONAL WELLNESS R&B

## 2021-01-09 PROCEDURE — 6370000000 HC RX 637 (ALT 250 FOR IP): Performed by: PSYCHIATRY & NEUROLOGY

## 2021-01-09 PROCEDURE — 6370000000 HC RX 637 (ALT 250 FOR IP): Performed by: NURSE PRACTITIONER

## 2021-01-09 PROCEDURE — 99232 SBSQ HOSP IP/OBS MODERATE 35: CPT | Performed by: NURSE PRACTITIONER

## 2021-01-09 RX ADMIN — BENZTROPINE MESYLATE 0.5 MG: 1 TABLET ORAL at 08:51

## 2021-01-09 RX ADMIN — DIVALPROEX SODIUM 500 MG: 250 TABLET, DELAYED RELEASE ORAL at 20:52

## 2021-01-09 RX ADMIN — Medication 3 MG: at 20:52

## 2021-01-09 RX ADMIN — TRAZODONE HYDROCHLORIDE 50 MG: 50 TABLET ORAL at 20:52

## 2021-01-09 RX ADMIN — DIVALPROEX SODIUM 500 MG: 250 TABLET, DELAYED RELEASE ORAL at 08:51

## 2021-01-09 RX ADMIN — POLYETHYLENE GLYCOL 3350 17 G: 17 POWDER, FOR SOLUTION ORAL at 08:51

## 2021-01-09 RX ADMIN — ARIPIPRAZOLE 25 MG: 15 TABLET ORAL at 08:51

## 2021-01-09 RX ADMIN — BENZTROPINE MESYLATE 0.5 MG: 1 TABLET ORAL at 21:57

## 2021-01-09 ASSESSMENT — PAIN SCALES - GENERAL
PAINLEVEL_OUTOF10: 0
PAINLEVEL_OUTOF10: 0

## 2021-01-09 NOTE — GROUP NOTE
Group Therapy Note    Date: 1/9/2021    Group Start Time: 1115  Group End Time: 9777  Group Topic: Cognitive Skills    [facility]    LATA Bowman      Group Therapy Note    Attendees: 12      Patient's Goal:  Pt will be able to identify unhealthy and healthy coping skills. Notes:  PT active in class discussion and activity. Status After Intervention:  Improved    Participation Level:  Active Listener and Interactive    Participation Quality: Appropriate, Attentive, Sharing and Supportive      Speech:  normal      Thought Process/Content: Logical      Affective Functioning: Congruent      Mood: euthymic      Level of consciousness:  Alert, Oriented x4 and Attentive      Response to Learning: Able to verbalize current knowledge/experience, Able to verbalize/acknowledge new learning and Progressing to goal      Endings: None Reported    Modes of Intervention: Education, Support, Socialization, Problem-solving and Activity      Discipline Responsible: /Counselor      Signature:  LATA Terrell

## 2021-01-09 NOTE — PROGRESS NOTES
Received call from pt's aunt and uncle Luz Calvo and Dipak Nunez) and were not pleased that pt was told that they did not want him to return to live with them after discharge  Aunt and uncle stated that they love having him live with them  Only that they were concerned that he did not feel safe there because of his paranoia   They stated that neighbors were in and out of their houses and pt spends a lot of time looking out the window and thinks the neihbors are out to hurt them and has asked for a gun so he is able to defend them   They do not want pt to think that they do not want him to live with them    Also asked to speak with NP or    Will pass this request along

## 2021-01-09 NOTE — PROGRESS NOTES
BEHAVIORAL HEALTH FOLLOW-UP NOTE     1/9/2021     Patient was seen and examined in person, Chart reviewed   Patient's case discussed with staff/team    Chief Complaint: \" I am feeling better. \"    Interim History: Patient is on the unit watching TV. He was attending groups earlier. His affect is brighter he is able to smile he appears less constricted less anxious. He is more relaxed. More conversational.  Denies SI/HI intent or plan denies auditory visual hallucinations denies any paranoid thoughts. Appetite:   [x] Normal/Unchanged  [] Increased  [] Decreased      Sleep:       [x] Normal/Unchanged  [] Fair       [] Poor              Energy:    [x] Normal/Unchanged  [] Increased  [] Decreased        SI [] Present  [x] Absent    HI  []Present  [x] Absent     Aggression:  [] yes  [x] no    Patient is [x] able  [] unable to CONTRACT FOR SAFETY     PAST MEDICAL/PSYCHIATRIC HISTORY:   Past Medical History:   Diagnosis Date    Colitis     1996       FAMILY/SOCIAL HISTORY:  Family History   Problem Relation Age of Onset    Mental Illness Mother      Social History     Socioeconomic History    Marital status: Single     Spouse name: Not on file    Number of children: 0    Years of education: 8th grade     Highest education level: Not on file   Occupational History    Occupation: construction     Employer: UNEMPLOYED     Comment: last worked Nov 1. Social Needs    Financial resource strain: Not on file    Food insecurity     Worry: Not on file     Inability: Not on file    Transportation needs     Medical: Not on file     Non-medical: Not on file   Tobacco Use    Smoking status: Current Every Day Smoker     Packs/day: 0.25     Years: 20.00     Pack years: 5.00     Types: Cigarettes     Start date: 1/8/2000    Smokeless tobacco: Never Used   Substance and Sexual Activity    Alcohol use:  Yes     Alcohol/week: 1.0 - 2.0 standard drinks     Types: 1 - 2 Cans of beer per week     Comment: none since Novemeber 2020    Drug use: Never    Sexual activity: Not Currently   Lifestyle    Physical activity     Days per week: Not on file     Minutes per session: Not on file    Stress: Not on file   Relationships    Social connections     Talks on phone: Not on file     Gets together: Not on file     Attends Church service: Not on file     Active member of club or organization: Not on file     Attends meetings of clubs or organizations: Not on file     Relationship status: Not on file    Intimate partner violence     Fear of current or ex partner: Not on file     Emotionally abused: Not on file     Physically abused: Not on file     Forced sexual activity: Not on file   Other Topics Concern    Not on file   Social History Narrative    Not on file           ROS:  [x] All negative/unchanged except if checked.  Explain positive(checked items) below:  [] Constitutional  [] Eyes  [] Ear/Nose/Mouth/Throat  [] Respiratory  [] CV  [] GI  []   [] Musculoskeletal  [] Skin/Breast  [] Neurological  [] Endocrine  [] Heme/Lymph  [] Allergic/Immunologic    Explanation:     MEDICATIONS:    Current Facility-Administered Medications:     ARIPiprazole (ABILIFY) tablet 25 mg, 25 mg, Oral, Daily, SEGUN Fournier CNP, 25 mg at 01/09/21 0851    divalproex (DEPAKOTE) DR tablet 500 mg, 500 mg, Oral, 2 times per day, SEGUN Vargas CNP, 038 mg at 01/09/21 0851    melatonin tablet 3 mg, 3 mg, Oral, Nightly, SEGUN Nunn CNP, 3 mg at 01/08/21 2115    benztropine (COGENTIN) tablet 0.5 mg, 0.5 mg, Oral, BID, SEGUN Nunn CNP, 0.5 mg at 01/09/21 0851    polyethylene glycol (GLYCOLAX) packet 17 g, 17 g, Oral, Daily, SEGUN Fournier CNP, 17 g at 01/09/21 0851    acetaminophen (TYLENOL) tablet 650 mg, 650 mg, Oral, Q6H PRN, Arturo Monterroso MD    magnesium hydroxide (MILK OF MAGNESIA) 400 MG/5ML suspension 30 mL, 30 mL, Oral, Daily PRN, Arturo Monterroso MD    aluminum & magnesium hydroxide-simethicone (MAALOX) 738-207-47 MG/5ML suspension 30 mL, 30 mL, Oral, PRN, Melodie Delacruz MD    hydrOXYzine (VISTARIL) capsule 50 mg, 50 mg, Oral, TID PRN, Melodie Delacruz MD    haloperidol lactate (HALDOL) injection 5 mg, 5 mg, Intramuscular, Q6H PRN **OR** haloperidol (HALDOL) tablet 5 mg, 5 mg, Oral, Q6H PRN, Melodie Delacruz MD    traZODone (DESYREL) tablet 50 mg, 50 mg, Oral, Nightly PRN, Melodie Delacruz MD, 50 mg at 01/08/21 2115      Examination:  /76   Pulse 61   Temp 97.1 °F (36.2 °C) (Temporal)   Resp 18   Ht 5' 6\" (1.676 m)   Wt 143 lb (64.9 kg)   SpO2 100%   BMI 23.08 kg/m²   Gait - steady  Medication side effects(SE): No    Mental Status Examination:    Level of consciousness:  within normal limits   Appearance: Fair grooming and hygiene   behavior/Motor:  no abnormalities noted  Attitude toward examiner: Cooperative   speech: Normal rate rhythm and tone  Mood: \" I am feeling better. \"  Affect: Appropriate and pleasant  Thought processes: Linear without flight of ideas loose associations  Thought content: Devoid of any auditory visualizations delusions or perceptual normalities. Denies SI/HI intent or plan  Cognition:  oriented to person, place, and time   Concentration poor  Insight improving  Judgement improving    ASSESSMENT:   Patient symptoms are:  [] Well controlled  [x] Improving  [] Worsening  [] No change      Diagnosis:   Principal Problem:    Schizophrenia, paranoid ([facility] [address]  Resolved Problems:    * No resolved hospital problems. *      LABS:    No results for input(s): WBC, HGB, PLT in the last 72 hours. No results for input(s): NA, K, CL, CO2, BUN, CREATININE, GLUCOSE in the last 72 hours. No results for input(s): BILITOT, ALKPHOS, AST, ALT in the last 72 hours.   Lab Results   Component Value Date    LABAMPH NOT DETECTED 01/05/2021    BARBSCNU NOT DETECTED 01/05/2021    LABBENZ NOT DETECTED 01/05/2021    LABMETH NOT DETECTED 01/05/2021    OPIATESCREENURINE NOT DETECTED 01/05/2021    PHENCYCLIDINESCREENURINE NOT DETECTED 01/05/2021    ETOH <10 01/05/2021     No results found for: TSH, FREET4  No results found for: LITHIUM  Lab Results   Component Value Date    VALPROATE 45 (L) 01/05/2021           Treatment Plan:  Reviewed current Medications with the patient. Risks, benefits, side effects, drug-to-drug interactions and alternatives to treatment were discussed. Collateral information:   CD evaluation  Encourage patient to attend group and other milieu activities.   Discharge planning discussed with the patient and treatment team.    Continue Abilify 25 mg daily for psychosis  Continue Abilify maintain a 400 mg IM q. 28 days  Continue Depakote 5 mg twice daily for mood stabilization  Continue Cogentin 0.5 mg twice daily for side effects  Continue melatonin 3 mg at bedtime for sleep      PSYCHOTHERAPY/COUNSELING:  [x] Therapeutic interview  [x] Supportive  [] CBT  [] Ongoing  [] Other    [x] Patient continues to need, on a daily basis, active treatment furnished directly by or requiring the supervision of inpatient psychiatric personnel      Anticipated Length of stay: 5 to 7 days based on stability            Electronically signed by SEGUN Rivera CNP on 8/6/0237 at 11:36 AM

## 2021-01-09 NOTE — PLAN OF CARE
Isolative to room this shift  when asked if he was having SI/HI  pt responded \"on and off\"  when asked if he still felt people were out to get him  he responded \"I'm not sure\"    affect is blunt   poor eye contact   denies hallucinations   cooperative with meds  not attending groups   eats meals in room   will continue to monitor

## 2021-01-09 NOTE — GROUP NOTE
Group Therapy Note    Date: 1/9/2021    Group Start Time: 1030  Group End Time: 7903  Group Topic: Psychoeducation    [facility] [address]        Group Therapy Note    Attendees: 10       Notes: Active and engaged during strengths discussion. Able to share and identify experiences with peers. Status After Intervention:  Improved    Participation Level:  Active Listener and Interactive    Participation Quality: Appropriate and Attentive      Speech:  normal      Thought Process/Content: Logical      Affective Functioning: Congruent      Mood: euthymic      Level of consciousness:  Alert and Attentive      Response to Learning: Progressing to goal      Endings: None Reported    Modes of Intervention: Education, Support, Socialization and Exploration      Discipline Responsible: Psychoeducational Specialist      Signature:  Jose Schafer

## 2021-01-10 PROCEDURE — 99232 SBSQ HOSP IP/OBS MODERATE 35: CPT | Performed by: NURSE PRACTITIONER

## 2021-01-10 PROCEDURE — 1240000000 HC EMOTIONAL WELLNESS R&B

## 2021-01-10 PROCEDURE — 6370000000 HC RX 637 (ALT 250 FOR IP): Performed by: NURSE PRACTITIONER

## 2021-01-10 RX ADMIN — BENZTROPINE MESYLATE 0.5 MG: 1 TABLET ORAL at 09:43

## 2021-01-10 RX ADMIN — BENZTROPINE MESYLATE 0.5 MG: 1 TABLET ORAL at 21:03

## 2021-01-10 RX ADMIN — POLYETHYLENE GLYCOL 3350 17 G: 17 POWDER, FOR SOLUTION ORAL at 09:43

## 2021-01-10 RX ADMIN — Medication 3 MG: at 21:02

## 2021-01-10 RX ADMIN — ARIPIPRAZOLE 25 MG: 15 TABLET ORAL at 09:43

## 2021-01-10 RX ADMIN — DIVALPROEX SODIUM 500 MG: 250 TABLET, DELAYED RELEASE ORAL at 21:03

## 2021-01-10 RX ADMIN — DIVALPROEX SODIUM 500 MG: 250 TABLET, DELAYED RELEASE ORAL at 09:43

## 2021-01-10 ASSESSMENT — PAIN SCALES - GENERAL: PAINLEVEL_OUTOF10: 0

## 2021-01-10 NOTE — PLAN OF CARE
Pleasant upon approach. Affect is flat. Reports suicidal thoughts are \"off and on\" still and have been there since loosing his job in November. Taking po foods and fluids well. Reports he feels safe in the hospital but when out feels he is being tracked. Denies thoughts of harming others.

## 2021-01-10 NOTE — PLAN OF CARE
Denies HI, endorses some SI but per pt only when he is not on the unit. He states when he is on the unit he feels safe. Does not have a plan for how he would do this. Pt reports he is sleeping and eating well. Pt denies AVH. Pt was initially flat but brightened with conversation and was congruent with what he said. Pt is isolative & states he is not around this many people in his everyday life. Pt is med compliant. Pt attends some groups although he states it is difficult for him to understand since he has trouble reading and writing.     Problem: Altered Mood, Depressive Behavior:  Goal: Absence of self-harm  Description: Absence of self-harm  Outcome: Met This Shift     Problem: Depressive Behavior With or Without Suicide Precautions:  Goal: Absence of self-harm  Description: Absence of self-harm  Outcome: Met This Shift

## 2021-01-10 NOTE — GROUP NOTE
Group Therapy Note    Date: 1/10/2021    Group Start Time: 1125  Group End Time: 5265  Group Topic: Cognitive Skills    [facility]    LATA Bowman        Group Therapy Note    Attendees: 12     Patient's Goal:  Pt will be able to identify actions that tear down self esteem and build up self esteem. Notes:  Pt active in class discussion and activity. Status After Intervention:  Improved    Participation Level:  Active Listener and Interactive    Participation Quality: Appropriate, Attentive, Sharing and Supportive      Speech:  normal      Thought Process/Content: Logical      Affective Functioning: Blunted      Mood: depressed      Level of consciousness:  Alert, Oriented x4 and Attentive      Response to Learning: Able to verbalize current knowledge/experience, Able to verbalize/acknowledge new learning and Progressing to goal      Endings: None Reported    Modes of Intervention: Education, Support, Socialization and Activity      Discipline Responsible: /Counselor      Signature:  LATA Rodriguez

## 2021-01-10 NOTE — GROUP NOTE
Group Therapy Note    Date: 1/10/2021    Group Start Time: 1653  Group End Time: 1100  Group Topic: Psychoeducation    [facility] [address]        Group Therapy Note    Attendees: 10         Notes: Active and engaged during discussion on building new habits. Able to identify one skill to improve today. Status After Intervention:  Improved    Participation Level:  Active Listener and Interactive    Participation Quality: Appropriate and Attentive      Speech:  normal      Thought Process/Content: Logical      Affective Functioning: Congruent      Mood: euthymic      Level of consciousness:  Alert and Attentive      Response to Learning: Progressing to goal      Endings: None Reported    Modes of Intervention: Education, Support, Socialization and Exploration      Discipline Responsible: Psychoeducational Specialist      Signature:  Candance Killer, [place]

## 2021-01-10 NOTE — PROGRESS NOTES
Patient was isolative to his room was out at times,but stayed to self. Verbalizes that he has thoughts of harming his self off and on, no plan, contracts for safety. Denies HI or AV hallucinations. Denies anxiety or depression. Verbalizes appetite os good. Sleep is good and is sleeping. Verbalizes that he will not know if people are still out to get him until he is discharged. Verbalizes he did attend 2 groups , but I don't like to do to I'm not much a reader or writer. Compliant with medications and does not attend groups. Safety rounds continue.

## 2021-01-10 NOTE — PROGRESS NOTES
BEHAVIORAL HEALTH FOLLOW-UP NOTE     1/10/2021     Patient was seen and examined in person, Chart reviewed   Patient's case discussed with staff/team    Chief Complaint: \" I am doing okay. \"    Interim History: Patient seen in his room his affect is still somewhat brighter. He is able to smile. He states his paranoia is improving. He does not want to go to the crisis unit of discharge and states he wants to go home with his aunt and uncle. He believes the reason he decompensated because when he went to [employer] that was the first time he had left the house in a long time. He shows limited insight and judgment to hospitalization need for treatment denies SI/HI intent or plan denies any auditory or visual hallucinations    Appetite:   [x] Normal/Unchanged  [] Increased  [] Decreased      Sleep:       [x] Normal/Unchanged  [] Fair       [] Poor              Energy:    [x] Normal/Unchanged  [] Increased  [] Decreased        SI [] Present  [x] Absent    HI  []Present  [x] Absent     Aggression:  [] yes  [x] no    Patient is [x] able  [] unable to CONTRACT FOR SAFETY     PAST MEDICAL/PSYCHIATRIC HISTORY:   Past Medical History:   Diagnosis Date    Colitis     1996       FAMILY/SOCIAL HISTORY:  Family History   Problem Relation Age of Onset    Mental Illness Mother      Social History     Socioeconomic History    Marital status: Single     Spouse name: Not on file    Number of children: 0    Years of education: 8th grade     Highest education level: Not on file   Occupational History    Occupation: construction     Employer: UNEMPLOYED     Comment: last worked 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    Social Needs    Financial resource strain: Not on file    Food insecurity     Worry: Not on file     Inability: Not on file    Transportation needs     Medical: Not on file     Non-medical: Not on file   Tobacco Use    Smoking status: Current Every Day Smoker     Packs/day: 0.25     Years: 20.00     Pack years: 5.00     Types: Cigarettes Start date: 1/8/2000    Smokeless tobacco: Never Used   Substance and Sexual Activity    Alcohol use: Yes     Alcohol/week: 1.0 - 2.0 standard drinks     Types: 1 - 2 Cans of beer per week     Comment: none since Novemeber 2020    Drug use: Never    Sexual activity: Not Currently   Lifestyle    Physical activity     Days per week: Not on file     Minutes per session: Not on file    Stress: Not on file   Relationships    Social connections     Talks on phone: Not on file     Gets together: Not on file     Attends Buddhist service: Not on file     Active member of club or organization: Not on file     Attends meetings of clubs or organizations: Not on file     Relationship status: Not on file    Intimate partner violence     Fear of current or ex partner: Not on file     Emotionally abused: Not on file     Physically abused: Not on file     Forced sexual activity: Not on file   Other Topics Concern    Not on file   Social History Narrative    Not on file           ROS:  [x] All negative/unchanged except if checked.  Explain positive(checked items) below:  [] Constitutional  [] Eyes  [] Ear/Nose/Mouth/Throat  [] Respiratory  [] CV  [] GI  []   [] Musculoskeletal  [] Skin/Breast  [] Neurological  [] Endocrine  [] Heme/Lymph  [] Allergic/Immunologic    Explanation:     MEDICATIONS:    Current Facility-Administered Medications:     ARIPiprazole (ABILIFY) tablet 25 mg, 25 mg, Oral, Daily, SEGUN Fournier - CNP, 25 mg at 01/10/21 0943    divalproex (DEPAKOTE) DR tablet 500 mg, 500 mg, Oral, 2 times per day, Sonido Saul APRN - CNP, 400 mg at 01/10/21 0943    melatonin tablet 3 mg, 3 mg, Oral, Nightly, Tiana Reza APRN - CNP, 3 mg at 01/09/21 2052    benztropine (COGENTIN) tablet 0.5 mg, 0.5 mg, Oral, BID, Tiana Reza APRN - CNP, 0.5 mg at 01/10/21 0943    polyethylene glycol (GLYCOLAX) packet 17 g, 17 g, Oral, Daily, Tiana Reza APRN - CNP, 17 g at 01/10/21 0943    acetaminophen (TYLENOL) tablet 650 mg, 650 mg, Oral, Q6H PRN, Quang Mcconnell MD    magnesium hydroxide (MILK OF MAGNESIA) 400 MG/5ML suspension 30 mL, 30 mL, Oral, Daily PRN, Quang Mcconnell MD    aluminum & magnesium hydroxide-simethicone (MAALOX) 200-200-20 MG/5ML suspension 30 mL, 30 mL, Oral, PRN, Quang Mcconnell MD    hydrOXYzine (VISTARIL) capsule 50 mg, 50 mg, Oral, TID PRN, Quang Mcconnell MD    haloperidol lactate (HALDOL) injection 5 mg, 5 mg, Intramuscular, Q6H PRN **OR** haloperidol (HALDOL) tablet 5 mg, 5 mg, Oral, Q6H PRN, Quang Mcconnell MD    traZODone (DESYREL) tablet 50 mg, 50 mg, Oral, Nightly PRN, Quang Mcconnell MD, 50 mg at 01/09/21 2052      Examination:  BP (!) 94/56   Pulse 62   Temp 97.2 °F (36.2 °C) (Oral)   Resp 14   Ht 5' 6\" (1.676 m)   Wt 143 lb (64.9 kg)   SpO2 100%   BMI 23.08 kg/m²   Gait - steady  Medication side effects(SE): No    Mental Status Examination:    Level of consciousness:  within normal limits   Appearance: Fair grooming and hygiene   behavior/Motor:  no abnormalities noted  Attitude toward examiner: Cooperative   speech: Normal rate rhythm and tone  Mood: \" I am feeling better. \"  Affect: Appropriate and pleasant  Thought processes: Linear without flight of ideas loose associations  Thought content: Devoid of any auditory visualizations delusions or perceptual normalities. Denies SI/HI intent or plan  Cognition:  oriented to person, place, and time   Concentration poor  Insight improving  Judgement improving    ASSESSMENT:   Patient symptoms are:  [] Well controlled  [x] Improving  [] Worsening  [] No change      Diagnosis:   Principal Problem:    Schizophrenia, paranoid ([facility] [address]  Resolved Problems:    * No resolved hospital problems. *      LABS:    No results for input(s): WBC, HGB, PLT in the last 72 hours. No results for input(s): NA, K, CL, CO2, BUN, CREATININE, GLUCOSE in the last 72 hours.   No results for input(s): BILITOT, ALKPHOS, AST, ALT in the last 72 hours. Lab Results   Component Value Date    LABAMPH NOT DETECTED 01/05/2021    BARBSCNU NOT DETECTED 01/05/2021    LABBENZ NOT DETECTED 01/05/2021    LABMETH NOT DETECTED 01/05/2021    OPIATESCREENURINE NOT DETECTED 01/05/2021    PHENCYCLIDINESCREENURINE NOT DETECTED 01/05/2021    ETOH <10 01/05/2021     No results found for: TSH, FREET4  No results found for: LITHIUM  Lab Results   Component Value Date    VALPROATE 45 (L) 01/05/2021           Treatment Plan:  Reviewed current Medications with the patient. Risks, benefits, side effects, drug-to-drug interactions and alternatives to treatment were discussed. Collateral information:   CD evaluation  Encourage patient to attend group and other milieu activities.   Discharge planning discussed with the patient and treatment team.    Continue Abilify 25 mg daily for psychosis  Continue Abilify maintain a 400 mg IM q. 28 days  Continue Depakote 5 mg twice daily for mood stabilization  Continue Cogentin 0.5 mg twice daily for side effects  Continue melatonin 3 mg at bedtime for sleep      PSYCHOTHERAPY/COUNSELING:  [x] Therapeutic interview  [x] Supportive  [] CBT  [] Ongoing  [] Other    [x] Patient continues to need, on a daily basis, active treatment furnished directly by or requiring the supervision of inpatient psychiatric personnel      Anticipated Length of stay: 5 to 7 days based on stability            Electronically signed by SEGUN Montero CNP on 8/63/2433 at 9:49 AM

## 2021-01-11 PROCEDURE — 6370000000 HC RX 637 (ALT 250 FOR IP): Performed by: NURSE PRACTITIONER

## 2021-01-11 PROCEDURE — 99232 SBSQ HOSP IP/OBS MODERATE 35: CPT | Performed by: NURSE PRACTITIONER

## 2021-01-11 PROCEDURE — 1240000000 HC EMOTIONAL WELLNESS R&B

## 2021-01-11 RX ADMIN — DIVALPROEX SODIUM 500 MG: 250 TABLET, DELAYED RELEASE ORAL at 20:33

## 2021-01-11 RX ADMIN — Medication 3 MG: at 22:07

## 2021-01-11 RX ADMIN — POLYETHYLENE GLYCOL 3350 17 G: 17 POWDER, FOR SOLUTION ORAL at 09:25

## 2021-01-11 RX ADMIN — BENZTROPINE MESYLATE 0.5 MG: 1 TABLET ORAL at 09:25

## 2021-01-11 RX ADMIN — BENZTROPINE MESYLATE 0.5 MG: 1 TABLET ORAL at 20:33

## 2021-01-11 RX ADMIN — ARIPIPRAZOLE 25 MG: 15 TABLET ORAL at 09:25

## 2021-01-11 RX ADMIN — DIVALPROEX SODIUM 500 MG: 250 TABLET, DELAYED RELEASE ORAL at 09:25

## 2021-01-11 NOTE — GROUP NOTE
Group Therapy Note    Date: 1/11/2021    Group Start Time: 1000  Group End Time: 3011  Group Topic: Psychoeducation    [facility]    Kassandra Kc, CTRS        Group Therapy Note      Number of participants: 12  Type of group: Psychoeducation  Mode of intervention: Education, Support, Socialization, Exploration, Clarifying, and Problem-solving  Topic: Mental Health Maintenance Plan   Objective: Pt will develop individual maintenance plan and share 1 way to implement plan post discharge. Patient's Goal:  \"Stay positive\"     Notes:  Pt interactive during group developing individual maintenance plan and shared 1 way to implement plan post discharge. Pt gave support and feedback to others. Status After Intervention:  Improved    Participation Level:  Active Listener and Interactive    Participation Quality: Appropriate, Attentive, Sharing and Supportive      Speech:  normal      Thought Process/Content: Logical      Affective Functioning: Flat      Mood: depressed      Level of consciousness:  Alert, Oriented x4 and Attentive      Response to Learning: Able to verbalize current knowledge/experience, Able to verbalize/acknowledge new learning, Able to retain information, Capable of insight, Able to change behavior and Progressing to goal      Endings: None Reported    Modes of Intervention: Education, Support, Socialization, Exploration, Clarifying and Problem-solving

## 2021-01-11 NOTE — PROGRESS NOTES
[facility] Interdisciplinary Treatment Plan Note     Review Date & Time: 1/11/21 1015    Patient was in treatment team.    Admission Type:   Admission Type: Involuntary    Reason for admission:  Reason for Admission: My anxiety kicked up and I was scared. Someone is following me I fear. Estimated Length of Stay Update:  3-5 dasy   Estimated Discharge Date Update: 1/15/21    PATIENT STRENGTHS:  Patient Strengths:Strengths: Communication, Positive Support  Patient Strengths and Limitations:Limitations: Hopeless about future  Addictive Behavior:Addictive Behavior  In the past 3 months, have you felt or has someone told you that you have a problem with:  : None  Do you have a history of Chemical Use?: No  Do you have a history of Alcohol Use?: No  Do you have a history of Street Drug Abuse?: No  Histroy of Prescripton Drug Abuse?: No  Medical Problems:   Past Medical History:   Diagnosis Date    Colitis     1996       Risk:  Fall RiskTotal: 65  Merlin Scale Merlin Scale Score: 22  BVC Total: 0  Change in scores no . Changes to plan of Care no     Status EXAM:   Status and Exam  Normal: No  Facial Expression: Brightened, Worried  Affect: Congruent  Level of Consciousness: Alert  Mood:Normal: No  Mood: Depressed, Sad, Suspicious  Motor Activity:Normal: No  Motor Activity: Decreased  Interview Behavior: Cooperative  Preception: [place] to Person, [place]heline [place] to Time, [place] to Place, [place] to Situation  Attention:Normal: No  Attention: Unable to Concentrate  Thought Processes: Circumstantial  Thought Content:Normal: No  Thought Content: Paranoia  Hallucinations: None  Delusions: Yes  Delusions:  Other(See Comment)(paranoia)  Memory:Normal: No  Memory: Poor Recent  Insight and Judgment: No  Insight and Judgment: Poor Judgment, Poor Insight  Present Suicidal Ideation: Yes(\"they come and go\")  Present Homicidal Ideation: No    Daily Assessment Last Entry:   Daily Sleep (WDL): Within Defined Limits Patient Currently in Pain: Other (comment)(Resting in bed apparently asleep)  Daily Nutrition (WDL): Within Defined Limits  Appetite Change: Decreased  Barriers to Nutrition: None  Level of Assistance: Independent/Self    Patient Monitoring:  Frequency of Checks: 4 times per hour, close    Psychiatric Symptoms:   Depression Symptoms  Depression Symptoms: Isolative  Anxiety Symptoms  Anxiety Symptoms: Generalized, Social phobias  Michelle Symptoms  Michelle Symptoms: No problems reported or observed. Psychosis Symptoms  Hallucination Type: No problems reported or observed. Delusion Type: Paranoid    Suicide Risk CSSR-S:  1) Within the past month, have you wished you were dead or wished you could go to sleep and not wake up? : Yes  2) Have you actually had any thoughts of killing yourself? : Yes  3) Have you been thinking about how you might kill yourself? : Yes  5) Have you started to work out or worked out the details of how to kill yourself?  Do you intend to carry out this plan? : No  6) Have you ever done anything, started to do anything, or prepared to do anything to end your life?: Yes  Change in Result no Change in Plan of care no    EDUCATION:   Learner Progress Toward Treatment Goals: Reviewed results and recommendations of this team, Reviewed group plan and strategies, Reviewed signs, symptoms and risk of self harm and violent behavior and Reviewed goals and plan of care    Method: Small group    Outcome: Demonstrated Understanding    PATIENT GOALS: \"Stay positive\"    PLAN/TREATMENT RECOMMENDATIONS UPDATE:  Encourage interaction with peers and groups    GOALS UPDATE:  Time frame for Short-Term Goals: daily      José Miguel Delatorre RN

## 2021-01-11 NOTE — PROGRESS NOTES
BEHAVIORAL HEALTH FOLLOW-UP NOTE     1/11/2021     Patient was seen and examined in person, Chart reviewed   Patient's case discussed with staff/team    Chief Complaint: \" I am doing okay. \"    Interim History: Patient seen during treatment team.  He continues to endorse suicidal ideations that \"come and go. \"  He states that he last had them yesterday. He also continues to endorse paranoia that comes and goes. He still does not want to discharge to the crisis unit. He shows limited insight and judgment. Denies auditory visual hallucinations    Appetite:   [x] Normal/Unchanged  [] Increased  [] Decreased      Sleep:       [x] Normal/Unchanged  [] Fair       [] Poor              Energy:    [x] Normal/Unchanged  [] Increased  [] Decreased        SI [] Present  [x] Absent    HI  []Present  [x] Absent     Aggression:  [] yes  [x] no    Patient is [x] able  [] unable to CONTRACT FOR SAFETY     PAST MEDICAL/PSYCHIATRIC HISTORY:   Past Medical History:   Diagnosis Date    Colitis     1996       FAMILY/SOCIAL HISTORY:  Family History   Problem Relation Age of Onset    Mental Illness Mother      Social History     Socioeconomic History    Marital status: Single     Spouse name: Not on file    Number of children: 0    Years of education: 8th grade     Highest education level: Not on file   Occupational History    Occupation: construction     Employer: UNEMPLOYED     Comment: last worked Nov 1. Social Needs    Financial resource strain: Not on file    Food insecurity     Worry: Not on file     Inability: Not on file    Transportation needs     Medical: Not on file     Non-medical: Not on file   Tobacco Use    Smoking status: Current Every Day Smoker     Packs/day: 0.25     Years: 20.00     Pack years: 5.00     Types: Cigarettes     Start date: 1/8/2000    Smokeless tobacco: Never Used   Substance and Sexual Activity    Alcohol use:  Yes     Alcohol/week: 1.0 - 2.0 standard drinks     Types: 1 - 2 Cans of beer aluminum & magnesium hydroxide-simethicone (MAALOX) 200-200-20 MG/5ML suspension 30 mL, 30 mL, Oral, PRN, Sharmila Merchant MD    hydrOXYzine (VISTARIL) capsule 50 mg, 50 mg, Oral, TID PRN, Sharmila Merchant MD    haloperidol lactate (HALDOL) injection 5 mg, 5 mg, Intramuscular, Q6H PRN **OR** haloperidol (HALDOL) tablet 5 mg, 5 mg, Oral, Q6H PRN, Sharmila Merchant MD    traZODone (DESYREL) tablet 50 mg, 50 mg, Oral, Nightly PRN, Sharmila Merchant MD, 50 mg at 01/09/21 2052      Examination:  BP (!) 93/59   Pulse 64   Temp 97.3 °F (36.3 °C) (Temporal)   Resp 14   Ht 5' 6\" (1.676 m)   Wt 143 lb (64.9 kg)   SpO2 100%   BMI 23.08 kg/m²   Gait - steady  Medication side effects(SE): No    Mental Status Examination:    Level of consciousness:  within normal limits   Appearance: Fair grooming and hygiene   behavior/Motor:  no abnormalities noted  Attitude toward examiner: Cooperative   speech: Normal rate rhythm and tone  Mood: \" I am feeling better. \"  Affect: Appropriate and pleasant  Thought processes: Linear without flight of ideas loose associations  Thought content: Devoid of any auditory visualizations delusions or perceptual normalities. Passive SI that comes and goes denies HI intent or plan  Cognition:  oriented to person, place, and time   Concentration poor  Insight improving  Judgement improving    ASSESSMENT:   Patient symptoms are:  [] Well controlled  [x] Improving  [] Worsening  [] No change      Diagnosis:   Principal Problem:    Sch[facility], [facility] ([facility]  Resolved Problems:    * No resolved hospital problems. *      LABS:    No results for input(s): WBC, HGB, PLT in the last 72 hours. No results for input(s): NA, K, CL, CO2, BUN, CREATININE, GLUCOSE in the last 72 hours. No results for input(s): BILITOT, ALKPHOS, AST, ALT in the last 72 hours.   Lab Results   Component Value Date    LABAMPH NOT DETECTED 01/05/2021    BARBSCNU NOT DETECTED 01/05/2021    LABBENZ NOT DETECTED 01/05/2021 LABMETH NOT DETECTED 01/05/2021    OPIATESCREENURINE NOT DETECTED 01/05/2021    PHENCYCLIDINESCREENURINE NOT DETECTED 01/05/2021    ETOH <10 01/05/2021     No results found for: TSH, FREET4  No results found for: LITHIUM  Lab Results   Component Value Date    VALPROATE 45 (L) 01/05/2021           Treatment Plan:  Reviewed current Medications with the patient. Risks, benefits, side effects, drug-to-drug interactions and alternatives to treatment were discussed. Collateral information:   CD evaluation  Encourage patient to attend group and other milieu activities.   Discharge planning discussed with the patient and treatment team.    Continue Abilify 25 mg daily for psychosis  Continue Abilify maintain a 400 mg IM q. 28 days  Continue Depakote 5 mg twice daily for mood stabilization  Continue Cogentin 0.5 mg twice daily for side effects  Continue melatonin 3 mg at bedtime for sleep      PSYCHOTHERAPY/COUNSELING:  [x] Therapeutic interview  [x] Supportive  [] CBT  [] Ongoing  [] Other    [x] Patient continues to need, on a daily basis, active treatment furnished directly by or requiring the supervision of inpatient psychiatric personnel      Anticipated Length of stay: 5 to 7 days based on stability            Electronically signed by SEGUN Nation CNP on 1/54/8645 at 4:22 PM

## 2021-01-11 NOTE — PLAN OF CARE
Patient is isolative and does not interact with peers often. He reports that he is anxious and this derives from social anxiety. Patient denies intent to harm self and he reports that his suicidal thoughts \"come and go. \" But he adds that whenever he did have them, there was never a plan. Patient denies depression, although he presents flat and saddened. Patient denies HI and AVH. No behavioral issues. Patient does present as though he wishes to be more forthcoming but holds back. Insight and judgement improving. Will monitor closely.

## 2021-01-12 PROCEDURE — 1240000000 HC EMOTIONAL WELLNESS R&B

## 2021-01-12 PROCEDURE — 6370000000 HC RX 637 (ALT 250 FOR IP): Performed by: PSYCHIATRY & NEUROLOGY

## 2021-01-12 PROCEDURE — 6370000000 HC RX 637 (ALT 250 FOR IP): Performed by: NURSE PRACTITIONER

## 2021-01-12 PROCEDURE — 99232 SBSQ HOSP IP/OBS MODERATE 35: CPT | Performed by: NURSE PRACTITIONER

## 2021-01-12 RX ADMIN — DIVALPROEX SODIUM 500 MG: 250 TABLET, DELAYED RELEASE ORAL at 09:04

## 2021-01-12 RX ADMIN — DIVALPROEX SODIUM 500 MG: 250 TABLET, DELAYED RELEASE ORAL at 21:26

## 2021-01-12 RX ADMIN — TRAZODONE HYDROCHLORIDE 50 MG: 50 TABLET ORAL at 21:26

## 2021-01-12 RX ADMIN — BENZTROPINE MESYLATE 0.5 MG: 1 TABLET ORAL at 21:26

## 2021-01-12 RX ADMIN — BENZTROPINE MESYLATE 0.5 MG: 1 TABLET ORAL at 09:04

## 2021-01-12 RX ADMIN — ARIPIPRAZOLE 25 MG: 15 TABLET ORAL at 09:04

## 2021-01-12 RX ADMIN — Medication 3 MG: at 21:26

## 2021-01-12 ASSESSMENT — PAIN SCALES - GENERAL: PAINLEVEL_OUTOF10: 0

## 2021-01-12 NOTE — PROGRESS NOTES
Isolating in room after much encouragement pt did come out to day room denies any SI HI or solomon emotional support given

## 2021-01-12 NOTE — CARE COORDINATION
QAMAR called [facility] [employer] at [phone] to inquire about SSI benefits for pt. QAMAR was directed to their staff member Molly's voice mail. QAMAR left message.

## 2021-01-12 NOTE — PLAN OF CARE
Patient is pleasant and cooperative. Patient makes needs known. He reports that he is feeling better. Free from paranoia. No behavioral issues. Patient does present depressed and anxious, however, he is brightened. He continues to report to suicidal thoughts \"coming and going. \" However, he denies having a plan when having the thoughts. Patient denies HI and AVH. Insight and judgement improving.  Will monitor closely

## 2021-01-12 NOTE — CARE COORDINATION
QAMAR made a 2nd attempt to contact the pt's aunt Hernandez Pineda [phone]  and uncle Ann-Marie Valadez at  [phone]. There was no response. QAMAR left voices messages to both Hernandez Pineda and Ann-Marie Valadez requesting them to call back.

## 2021-01-12 NOTE — CARE COORDINATION
SW made an attempt to complete a conference call with pt along with his  aunt Jose Guadalupe Galicia) and Rivera Mcdermott)   to further discuss discharge planning. Pt states he prefers to return home with Shanita Medina and Rayo Bang once a discharge date is set. SW left voice messages requesting Shanita Medina /Sonny to call back.

## 2021-01-12 NOTE — PROGRESS NOTES
BEHAVIORAL HEALTH FOLLOW-UP NOTE     1/12/2021     Patient was seen and examined in person, Chart reviewed   Patient's case discussed with staff/team    Chief Complaint: \" I am doing okay. \"    Interim History: Patient up on the unit denies SI/HI intent or plan denies auditory visual hallucinations. He states he feels less paranoid. He has attended some groups. He is out in the unit more. His affect is brighter. He continues to refuse to go to the crisis unit on discharge and states that he wants to go home on discharge. He is attempting to contact his aunt and uncle. Appetite:   [x] Normal/Unchanged  [] Increased  [] Decreased      Sleep:       [x] Normal/Unchanged  [] Fair       [] Poor              Energy:    [x] Normal/Unchanged  [] Increased  [] Decreased        SI [] Present  [x] Absent    HI  []Present  [x] Absent     Aggression:  [] yes  [x] no    Patient is [x] able  [] unable to CONTRACT FOR SAFETY     PAST MEDICAL/PSYCHIATRIC HISTORY:   Past Medical History:   Diagnosis Date    Colitis     1996       FAMILY/SOCIAL HISTORY:  Family History   Problem Relation Age of Onset    Mental Illness Mother      Social History     Socioeconomic History    Marital status: Single     Spouse name: Not on file    Number of children: 0    Years of education: 8th grade     Highest education level: Not on file   Occupational History    Occupation: construction     Employer: UNEMPLOYED     Comment: last worked Nov 1. Social Needs    Financial resource strain: Not on file    Food insecurity     Worry: Not on file     Inability: Not on file    Transportation needs     Medical: Not on file     Non-medical: Not on file   Tobacco Use    Smoking status: Current Every Day Smoker     Packs/day: 0.25     Years: 20.00     Pack years: 5.00     Types: Cigarettes     Start date: 1/8/2000    Smokeless tobacco: Never Used   Substance and Sexual Activity    Alcohol use:  Yes     Alcohol/week: 1.0 - 2.0 standard drinks Types: 1 - 2 Cans of beer per week     Comment: none since Novemeber 2020    Drug use: Never    Sexual activity: Not Currently   Lifestyle    Physical activity     Days per week: Not on file     Minutes per session: Not on file    Stress: Not on file   Relationships    Social connections     Talks on phone: Not on file     Gets together: Not on file     Attends Alevism service: Not on file     Active member of club or organization: Not on file     Attends meetings of clubs or organizations: Not on file     Relationship status: Not on file    Intimate partner violence     Fear of current or ex partner: Not on file     Emotionally abused: Not on file     Physically abused: Not on file     Forced sexual activity: Not on file   Other Topics Concern    Not on file   Social History Narrative    Not on file           ROS:  [x] All negative/unchanged except if checked.  Explain positive(checked items) below:  [] Constitutional  [] Eyes  [] Ear/Nose/Mouth/Throat  [] Respiratory  [] CV  [] GI  []   [] Musculoskeletal  [] Skin/Breast  [] Neurological  [] Endocrine  [] Heme/Lymph  [] Allergic/Immunologic    Explanation:     MEDICATIONS:    Current Facility-Administered Medications:     ARIPiprazole (ABILIFY) tablet 25 mg, 25 mg, Oral, Daily, Che Reza APRN - CNP, 25 mg at 01/12/21 0904    divalproex (DEPAKOTE) DR tablet 500 mg, 500 mg, Oral, 2 times per day, Ajay Ramirez APRN - CNP, 303 mg at 01/12/21 0904    melatonin tablet 3 mg, 3 mg, Oral, Nightly, Norecaritoa Ranger Reza, APRN - CNP, 3 mg at 01/11/21 2207    benztropine (COGENTIN) tablet 0.5 mg, 0.5 mg, Oral, BID, Noretta Ranger Reza, APRN - CNP, 0.5 mg at 01/12/21 0904    polyethylene glycol (GLYCOLAX) packet 17 g, 17 g, Oral, Daily, Che Reza APRN - CNP, 17 g at 01/11/21 0925    acetaminophen (TYLENOL) tablet 650 mg, 650 mg, Oral, Q6H PRN, Hannah Medrano MD    magnesium hydroxide (MILK OF MAGNESIA) 400 MG/5ML suspension 30 mL, 30 mL, Oral, Daily PRN, Anupama Deluca MD    aluminum & magnesium hydroxide-simethicone (MAALOX) 200-200-20 MG/5ML suspension 30 mL, 30 mL, Oral, PRN, Anupama Deluca MD    hydrOXYzine (VISTARIL) capsule 50 mg, 50 mg, Oral, TID PRN, Anupama Deluca MD    haloperidol lactate (HALDOL) injection 5 mg, 5 mg, Intramuscular, Q6H PRN **OR** haloperidol (HALDOL) tablet 5 mg, 5 mg, Oral, Q6H PRN, Anupama Deluca MD    traZODone (DESYREL) tablet 50 mg, 50 mg, Oral, Nightly PRN, Anupama Deluca MD, 50 mg at 01/09/21 2052      Examination:  /63   Pulse 63   Temp 97.4 °F (36.3 °C) (Temporal)   Resp 15   Ht 5' 6\" (1.676 m)   Wt 143 lb (64.9 kg)   SpO2 100%   BMI 23.08 kg/m²   Gait - steady  Medication side effects(SE): No    Mental Status Examination:    Level of consciousness:  within normal limits   Appearance: Fair grooming and hygiene   behavior/Motor:  no abnormalities noted  Attitude toward examiner: Cooperative   speech: Normal rate rhythm and tone  Mood: \" I am feeling better. \"  Affect: Appropriate and pleasant  Thought processes: Linear without flight of ideas loose associations  Thought content: Devoid of any auditory visualizations delusions or perceptual normalities. Passive SI that comes and goes denies HI intent or plan  Cognition:  oriented to person, place, and time   Concentration poor  Insight improving  Judgement improving    ASSESSMENT:   Patient symptoms are:  [] Well controlled  [x] Improving  [] Worsening  [] No change      Diagnosis:   Principal Problem:    [facility] ([facility]  Resolved Problems:    * No resolved hospital problems. *      LABS:    No results for input(s): WBC, HGB, PLT in the last 72 hours. No results for input(s): NA, K, CL, CO2, BUN, CREATININE, GLUCOSE in the last 72 hours. No results for input(s): BILITOT, ALKPHOS, AST, ALT in the last 72 hours.   Lab Results   Component Value Date    LABAMPH NOT DETECTED 01/05/2021    BARBSCNU NOT DETECTED 01/05/2021    LABBENZ NOT DETECTED 01/05/2021    LABMETH NOT DETECTED 01/05/2021    OPIATESCREENURINE NOT DETECTED 01/05/2021    PHENCYCLIDINESCREENURINE NOT DETECTED 01/05/2021    ETOH <10 01/05/2021     No results found for: TSH, FREET4  No results found for: LITHIUM  Lab Results   Component Value Date    VALPROATE 45 (L) 01/05/2021           Treatment Plan:  Reviewed current Medications with the patient. Risks, benefits, side effects, drug-to-drug interactions and alternatives to treatment were discussed. Collateral information:   CD evaluation  Encourage patient to attend group and other milieu activities.   Discharge planning discussed with the patient and treatment team.    Continue Abilify 25 mg daily for psychosis  Continue Abilify maintain a 400 mg IM q. 28 days  Continue Depakote 5 mg twice daily for mood stabilization  Continue Cogentin 0.5 mg twice daily for side effects  Continue melatonin 3 mg at bedtime for sleep      PSYCHOTHERAPY/COUNSELING:  [x] Therapeutic interview  [x] Supportive  [] CBT  [] Ongoing  [] Other    [x] Patient continues to need, on a daily basis, active treatment furnished directly by or requiring the supervision of inpatient psychiatric personnel      Anticipated Length of stay: 5 to 7 days based on stability            Electronically signed by SEGUN Rizzo CNP on 3/38/8784 at 9:45 AM

## 2021-01-13 PROCEDURE — 6370000000 HC RX 637 (ALT 250 FOR IP): Performed by: NURSE PRACTITIONER

## 2021-01-13 PROCEDURE — 1240000000 HC EMOTIONAL WELLNESS R&B

## 2021-01-13 PROCEDURE — 6370000000 HC RX 637 (ALT 250 FOR IP): Performed by: PSYCHIATRY & NEUROLOGY

## 2021-01-13 PROCEDURE — 99232 SBSQ HOSP IP/OBS MODERATE 35: CPT | Performed by: NURSE PRACTITIONER

## 2021-01-13 RX ORDER — ARIPIPRAZOLE 5 MG/1
25 TABLET ORAL DAILY
Qty: 150 TABLET | Refills: 0 | Status: SHIPPED | OUTPATIENT
Start: 2021-01-14 | End: 2021-02-13

## 2021-01-13 RX ADMIN — DIVALPROEX SODIUM 500 MG: 250 TABLET, DELAYED RELEASE ORAL at 21:41

## 2021-01-13 RX ADMIN — DIVALPROEX SODIUM 500 MG: 250 TABLET, DELAYED RELEASE ORAL at 09:45

## 2021-01-13 RX ADMIN — BENZTROPINE MESYLATE 0.5 MG: 1 TABLET ORAL at 09:45

## 2021-01-13 RX ADMIN — TRAZODONE HYDROCHLORIDE 50 MG: 50 TABLET ORAL at 21:41

## 2021-01-13 RX ADMIN — Medication 3 MG: at 21:42

## 2021-01-13 RX ADMIN — BENZTROPINE MESYLATE 0.5 MG: 1 TABLET ORAL at 21:46

## 2021-01-13 RX ADMIN — ARIPIPRAZOLE 25 MG: 15 TABLET ORAL at 09:45

## 2021-01-13 ASSESSMENT — PAIN SCALES - GENERAL: PAINLEVEL_OUTOF10: 0

## 2021-01-13 NOTE — GROUP NOTE
Group Therapy Note    Date: 1/13/2021    Group Start Time: 0100  Group End Time: 2845  Group Topic: Cognitive Skills    [facility]    JESSIE Randall, LSW    Number of participants: 13  Type of group: Cognitive Skills  Mode of intervention: Education, Support, Socialization, Exploration, Clarifying, and Problem-solving  Topic: Barriers to Mental Health  Objective:  Improve communication skills    Group Therapy Note         Notes: Pt was an active participant in cognitive skills group focusing on communication skills and improving interpersonal relationships. Pt was able to share personal information and provide supportive feedback to group members. Status After Intervention:  Improved    Participation Level:  Active Listener and Interactive    Participation Quality: Appropriate, Attentive, Sharing and Supportive      Speech:  normal      Thought Process/Content: Logical      Affective Functioning: Congruent      Mood: anxious      Level of consciousness:  Alert, Oriented x4 and Attentive      Response to Learning: Progressing to goal      Endings: None Reported    Modes of Intervention: Education, Support, Socialization, Exploration, Clarifying and Problem-solving      Discipline Responsible: /Counselor      Signature:  JESSIE Randall, [employer]

## 2021-01-13 NOTE — CARE COORDINATION
QAMAR note: d/c planning: SW left message for aunt on her phone number and on [address] [facility]. SW spoke with Marelydanny Palmer on phone. They do not feel he should be released at all. She states this is his 4th hospitalization in the last 2 months. He is terrified of being home alone and they both work. He is scared to leave the home. She states that he feels everyone is out to get him. He was so frightened last week and kept asking where there was a gun. He thinks chipmunks and birds are out to get him. As soon as he gets out of the 24 hr supervision he becomes unglued. Aunt is taking care of her mother so many times she is out of the home. .  When they have him in car he is convinced that there are implanted devices on their car following him. He is also convinced that other cars are following them. When they took him to [place] he was convinced that people were planning to hurt or kill him. Family had tried crisis unit last time. But because of his delusional beliefs, he was scared that crisis unit was trying to kill him. They don't feel he can take him home when he is so paranoid that he is convinced that others and animals are at risk that he may become violent. He keeps telling aunt and uncle that he is doing good and will work through his issues but they don't feel he is ready at this time. They would be willing to take him on a trial basis like on Saturday but aren't comfortable to take him home presently. They don't feel d/c at this point is safe. 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 Uncle and Aunt are not his biological family. Uncle's father took him under his wing and they have tried to help him. [facility] and he has no contact with his biological family. They state that they had paid for his meds last time but can't keep paying money for his prescriptions. They asked re; if he could be placed on a secondary insurance that would pay more for meds.  Presently, med copay for meds is $54.71.     SW spoke with pt re: crisis unit with [facility]. Pt continues to decline crisis unit. He didn't feel safe there last time and is worried that he will feel unsafe then again. He will only consider returning home to aunt and uncle at this time.

## 2021-01-13 NOTE — PROGRESS NOTES
Patients family member aunt and uncle Karyn Thomas and Meaghan Geronimo called. Concerned about discharge planning. Patient voicing to family that squirrells and animals are robots and paranoid and suspicious of neighbors. Concerned about safety of others and self. Being left alone and harming self. Would like to explore possible longer term stabilization before returning home. Would like to talk with treatment team to discuss further concerns about discharge.

## 2021-01-13 NOTE — PLAN OF CARE
Problem: Altered Mood, Depressive Behavior:  Goal: Able to verbalize and/or display a decrease in depressive symptoms  Description: Able to verbalize and/or display a decrease in depressive symptoms  Outcome: Ongoing  MOOD DEPRESSED. DOES EAT MEALS AND TAKE MEDICATIONS. DID SHOWER THIS A. M..   Goal: Absence of self-harm  Description: Absence of self-harm  Outcome: Met This Shift  NO SELF HARM BEHAVIORS REPORTED OR OBSERVED.

## 2021-01-13 NOTE — PROGRESS NOTES
IN BED EXCEPT FOR MEALS THIS A. M.. . AFFECT LESS FLAT, IMPROVED EYE CONTACT, PREOCCUPIED. NO SELF HARM. COMPLIANT TO MEDICATIONS. ATTENDS SELECT GROUPS. DENIES HALLUCINATIONS. MINIMAL VERBALIZATION. DID NOT FREELY VOICE ANY DELUSIONS. REPORTS FLEETING SUICIDAL IDEATIONS WITH NO PLAN. WILL CONTINUE TO PROVIDE SUPPORT AND ENCOURAGEMENT.

## 2021-01-13 NOTE — GROUP NOTE
Group Therapy Note    Date: 1/13/2021    Group Start Time: 1115  Group End Time: 1200  Group Topic: Cognitive Skills    [facility]    LATA Bowman        Group Therapy Note    Attendees: 14         Patient's Goal:  Pt will be able to identify negative communication principles he/she would like to change and identify positive communication principles. Notes:  PT joined in class discussion and read when called upon. Pt seems less anxious and stays and participates in classes now. Status After Intervention:  Improved    Participation Level:  Active Listener and minimal    Participation Quality: Appropriate, Attentive, Sharing and Supportive      Speech:  normal      Thought Process/Content: Logical, linear      Affective Functioning: Blunted      Mood: depressed      Level of consciousness:  Alert, Oriented x4 and Attentive      Response to Learning: Able to verbalize current knowledge/experience, Able to verbalize/acknowledge new learning and Progressing to goal      Endings: None Reported    Modes of Intervention: Education, Support, Socialization and Problem-solving      Discipline Responsible: /Counselor      Signature:  LATA Martin
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
## 2021-01-13 NOTE — PROGRESS NOTES
Out in DR denies any SI HI or solomon affect little sad non social with peers encouraged to participate in milieu emotional support given

## 2021-01-14 PROCEDURE — 99232 SBSQ HOSP IP/OBS MODERATE 35: CPT | Performed by: NURSE PRACTITIONER

## 2021-01-14 PROCEDURE — 1240000000 HC EMOTIONAL WELLNESS R&B

## 2021-01-14 PROCEDURE — 6370000000 HC RX 637 (ALT 250 FOR IP): Performed by: NURSE PRACTITIONER

## 2021-01-14 PROCEDURE — 6370000000 HC RX 637 (ALT 250 FOR IP): Performed by: PSYCHIATRY & NEUROLOGY

## 2021-01-14 RX ADMIN — DIVALPROEX SODIUM 500 MG: 250 TABLET, DELAYED RELEASE ORAL at 22:20

## 2021-01-14 RX ADMIN — BENZTROPINE MESYLATE 0.5 MG: 1 TABLET ORAL at 22:19

## 2021-01-14 RX ADMIN — TRAZODONE HYDROCHLORIDE 50 MG: 50 TABLET ORAL at 22:20

## 2021-01-14 RX ADMIN — BENZTROPINE MESYLATE 0.5 MG: 1 TABLET ORAL at 09:54

## 2021-01-14 RX ADMIN — DIVALPROEX SODIUM 500 MG: 250 TABLET, DELAYED RELEASE ORAL at 09:54

## 2021-01-14 RX ADMIN — Medication 3 MG: at 22:19

## 2021-01-14 RX ADMIN — ARIPIPRAZOLE 25 MG: 15 TABLET ORAL at 09:54

## 2021-01-14 ASSESSMENT — PAIN SCALES - GENERAL: PAINLEVEL_OUTOF10: 0
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
## 2021-01-14 NOTE — PLAN OF CARE
Problem: Altered Mood, Depressive Behavior:  Goal: Ability to disclose and discuss suicidal ideas will improve  Description: Ability to disclose and discuss suicidal ideas will improve  1/14/2021 0921 by Raul Martinez RN  Outcome: Ongoing  PT.  REPORTS CHRONIC SUICIDAL IDEATIONS WITH NO PLAN OR INTENT AT THIS TIME.   1/13/2021 2226 by Deshaun Solorio RN  Outcome: Ongoing     Problem: Altered Mood, Depressive Behavior:  Goal: Absence of self-harm  Description: Absence of self-harm  1/14/2021 0921 by Raul Martinez RN  Outcome: Met This Shift  NO SELF HARM BEHAVIORS REPORTED OR OBSERVED.   1/13/2021 2226 by Deshaun Solorio RN  Outcome: Ongoing
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
## 2021-01-14 NOTE — PROGRESS NOTES
CLINICAL PHARMACY NOTE: MEDS TO [address] Select Patient?: No  Total # of Prescriptions Filled: 1   The following medications were delivered to the patient:  · Aripiprazole 5  Total # of Interventions Completed: 3  Time Spent (min): 30    Additional Documentation:

## 2021-01-14 NOTE — GROUP NOTE
Stated does not have a goal today. Group Therapy Note    Date: 1/14/2021    Group Start Time: 1010  Group End Time: 1100  Group Topic: Psychoeducation    [facility]    YAMIL Rodgers          Type of Group: Psychoeducation    Wellness Binder Information  Module Name:  self care vs coping skills   Patient's Goal: patient will be able to id difference in daily self care vs coping skills  Notes:  pleasant and engaged in group     Status After Intervention:  Improved    Participation Level:  Active Listener    Participation Quality: Appropriate, Attentive, Sharing, and Supportive      Speech: normal       Thought Process/Content: Logical      Affective Functioning: Congruent      Mood: euthymic      Level of consciousness:  Alert, Oriented x4, and Attentive      Response to Learning: Able to verbalize/acknowledge new learning, Able to retain information, and Progressing to goal      Endings: None Reported    Modes of Intervention: Education, Support, Socialization, Exploration, and Problem-solving      Discipline Responsible: Psychoeducational Specialist      Signature:  Vi Cristobal

## 2021-01-14 NOTE — GROUP NOTE
Group Therapy Note    Date: 1/14/2021    Group Start Time: 1120  Group End Time: 1200  Group Topic: Cognitive Skills    [facility]    LATA Bowman        Group Therapy Note    Attendees: 14         Patient's Goal:  Pt will be able to participate in discussion re: ways to forgive self and let go of the past. Pt will identify affirmation to work on. Notes:  Pt participated when called upon in class discussion but doesn't share on his own. Status After Intervention:  Improved    Participation Level:  Active Listener and minimal    Participation Quality: Appropriate, Attentive, and Supportive      Speech:  normal      Thought Process/Content: Logical      Affective Functioning: Blunted    Mood: depressed      Level of consciousness:  Alert, Oriented x4 and Attentive      Response to Learning: Able to verbalize current knowledge/experience, Able to verbalize/acknowledge new learning and Progressing to goal      Endings: None Reported    Modes of Intervention: Education, Support, Socialization and Problem-solving      Discipline Responsible: /Counselor      Signature:  LATA Martin

## 2021-01-15 VITALS
BODY MASS INDEX: 22.98 KG/M2 | WEIGHT: 143 LBS | DIASTOLIC BLOOD PRESSURE: 48 MMHG | SYSTOLIC BLOOD PRESSURE: 91 MMHG | OXYGEN SATURATION: 100 % | TEMPERATURE: 97 F | HEART RATE: 59 BPM | HEIGHT: 66 IN | RESPIRATION RATE: 14 BRPM

## 2021-01-15 PROCEDURE — 6370000000 HC RX 637 (ALT 250 FOR IP): Performed by: NURSE PRACTITIONER

## 2021-01-15 PROCEDURE — 99239 HOSP IP/OBS DSCHRG MGMT >30: CPT | Performed by: NURSE PRACTITIONER

## 2021-01-15 RX ADMIN — BENZTROPINE MESYLATE 0.5 MG: 1 TABLET ORAL at 09:28

## 2021-01-15 RX ADMIN — ARIPIPRAZOLE 25 MG: 15 TABLET ORAL at 09:28

## 2021-01-15 RX ADMIN — DIVALPROEX SODIUM 500 MG: 250 TABLET, DELAYED RELEASE ORAL at 09:28

## 2021-01-15 NOTE — PLAN OF CARE
Patient A+ O x 4, denies SI, HI, and hallucinations. Denies both anxiety and depression. Isolates to room. Calm, pleasant demeanor. Compliant with medication regimen and attends group sessions.  Evasive  Problem: Altered Mood, Depressive Behavior:  Goal: Absence of self-harm  Description: Absence of self-harm  1/15/2021 1010 by Kaylynn Colvin RN  Outcome: Met This Shift     Problem: Altered Mood, Depressive Behavior:  Goal: Able to verbalize acceptance of life and situations over which he or she has no control  Description: Able to verbalize acceptance of life and situations over which he or she has no control  1/15/2021 1010 by Kaylynn Colvin, RN  Outcome: Ongoing

## 2021-01-15 NOTE — CARE COORDINATION
QAMAR note: d/c planning: SW spoke with pts aunt and uncle on phone at 36 last night. When sw asked them if they had gotten my messages yesterday they stated that yes but they didn't call back because they felt the staff was \"harassing\" them. They state that he is obviously not thinking clearly as he is refusing to go to crisis unit even though drJacquelin, staff, and aunt and uncle were recommending crisis unit. They feel he is not safe for release as when he is out in community \"he is an absolute disaster\". In community, they feel he is convinced that others are after him and he is constantly talking about guns and the need to protect himself. They state that they will not \"put our house or our neighbors in jeopardy\". They state this is not a safe release and it would be a tragedy if staff let him leave to go to a shelter. They      They state that they have bent over backwards to help him and he is so stubborn that he won't even try to stepdown. They state they will never forget how scared he was in the last month and his horrible delusions and suicidal thoughts. They state that they need to see a transformation and that the crisis unit is the only measurement that will indicate his paranoia has been reduced enough to accomodate a safe d/c. They would like pt to go to crisis unit and would like to be able to pick him up on Saturdays and maybe even on Sundays for visitation but presently feel he needs the support and monitoring at the crisis unit. Marie Linton states she will be available today to take over meds to crisis unit. She and Duarte Ortez talked with pt last night and again informed him he has to go to crisis unit. SW will discuss d/c planning with pt and encourage compliance with stepdown to crisis unit. QAMAR spoke with pt re: d/c planning. HE states \"OK, you guys win. I'll go to the crisis unit. \"    SW faxed info to crisis unit. They called back and have accepted him. QAMAR called and spoke to Zahra.  She will take his meds to the crisis unit. She expressed appreciation for all our efforts to help pt.

## 2021-01-15 NOTE — GROUP NOTE
Group Therapy Note    Date: 1/15/2021    Group Start Time: 1120  Group End Time: 1200  Group Topic: Cognitive Skills    [facility]    LATA Bowman        Group Therapy Note    Attendees: 16         Patient's Goal:  Pt will complete Relapse prevention and safety plan. Notes:  Pt completed Relapse Prevention and safety plan. Pt active in class discussion and made positive connections with others. Status After Intervention:  Improved    Participation Level:  Active Listener and Interactive    Participation Quality: Appropriate, Attentive, Sharing and Supportive      Speech:  normal      Thought Process/Content: Logical      Affective Functioning: Flat      Mood: Depressed and anxious      Level of consciousness:  Alert, Oriented x4 and Attentive      Response to Learning: Able to verbalize current knowledge/experience, Able to verbalize/acknowledge new learning and Progressing to goal      Endings: None Reported    Modes of Intervention: Education, Support, Socialization and Problem-solving      Discipline Responsible: /Counselor      Signature:  LATA Ernst

## 2021-01-15 NOTE — PLAN OF CARE
Problem: Altered Mood, Depressive Behavior:  Goal: Able to verbalize acceptance of life and situations over which he or she has no control  Description: Able to verbalize acceptance of life and situations over which he or she has no control  Outcome: Ongoing  Goal: Able to verbalize and/or display a decrease in depressive symptoms  Description: Able to verbalize and/or display a decrease in depressive symptoms  Outcome: Ongoing  Goal: Ability to disclose and discuss suicidal ideas will improve  Description: Ability to disclose and discuss suicidal ideas will improve  Outcome: Met This Shift     Pt denies suicidal ideations, homicidal ideations and hallucinations. Pt out on the unit the whole shift. Brightened when spoke to. Medication compliant. Attending groups. Will continue to monitor.

## 2021-01-18 NOTE — PROGRESS NOTES
CLINICAL PHARMACY NOTE: MEDS TO [address] Select Patient?: No  Total # of Prescriptions Filled: 1   The following medications were delivered to the patient:  · Aripiprazole 20  Total # of Interventions Completed: 3  Time Spent (min): 30    Additional Documentation:    Gave patient the 1 tablet we had In stock we owe him 29 he was going to crisis unit

## 2021-01-18 NOTE — DISCHARGE SUMMARY
DISCHARGE SUMMARY      Patient ID:  Karine Herrera  [id number]  38 y.o.  1985    Admit date: 1/5/2021    Discharge date and time: 1/15/21    Admitting Physician: Jen Guerrero MD     Discharge Physician: Dr Jeremy Alcala MD    Admission Diagnoses: Acute psychosis [facility]) [F23]    Admission Condition: poor    Discharged Condition: stable    Admission Circumstance: Presented the ED brought in by family for suicidal ideations and paranoia believing the people are falling around at the [employer] and he was fearful for his life      PAST MEDICAL/PSYCHIATRIC HISTORY:   Past Medical History:   Diagnosis Date    Colitis     1996       FAMILY/SOCIAL HISTORY:  Family History   Problem Relation Age of Onset    Mental Illness Mother      Social History     Socioeconomic History    Marital status: Single     Spouse name: Not on file    Number of children: 0    Years of education: 8th grade     Highest education level: Not on file   Occupational History    Occupation: construction     Employer: UNEMPLOYED     Comment: last worked Nov 1. Social Needs    Financial resource strain: Not on file    Food insecurity     Worry: Not on file     Inability: Not on file    Transportation needs     Medical: Not on file     Non-medical: Not on file   Tobacco Use    Smoking status: Current Every Day Smoker     Packs/day: 0.25     Years: 20.00     Pack years: 5.00     Types: Cigarettes     Start date: 1/8/2000    Smokeless tobacco: Never Used   Substance and Sexual Activity    Alcohol use:  Yes     Alcohol/week: 1.0 - 2.0 standard drinks     Types: 1 - 2 Cans of beer per week     Comment: none since Novemeber 2020    Drug use: Never    Sexual activity: Not Currently   Lifestyle    Physical activity     Days per week: Not on file     Minutes per session: Not on file    Stress: Not on file   Relationships    Social connections     Talks on phone: Not on file     Gets together: Not on file     Attends Sabianist service: Not on file Active member of club or organization: Not on file     Attends meetings of clubs or organizations: Not on file     Relationship status: Not on file    Intimate partner violence     Fear of current or ex partner: Not on file     Emotionally abused: Not on file     Physically abused: Not on file     Forced sexual activity: Not on file   Other Topics Concern    Not on file   Social History Narrative    Not on file       MEDICATIONS:  No current facility-administered medications for this encounter. Current Outpatient Medications:     ARIPiprazole (ABILIFY) 5 MG tablet, Take 5 tablets by mouth daily, Disp: 150 tablet, Rfl: 0    polyethylene glycol (GLYCOLAX) 17 GM/SCOOP powder, Take 17 g by mouth daily Patient takes 1/4 teaspoon daily, Disp: , Rfl:     divalproex (DEPAKOTE) 500 MG DR tablet, Take 1 tablet by mouth every 12 hours, Disp: 60 tablet, Rfl: 0    benztropine (COGENTIN) 0.5 MG tablet, Take 1 tablet by mouth 2 times daily, Disp: 60 tablet, Rfl: 0    ARIPiprazole ER (ABILIFY MAINTENA) 400 MG PRSY, Inject 400 mg into the muscle every 30 days Last dose 11/29/20, Disp: , Rfl:     melatonin 3 MG TABS tablet, Take 1 tablet by mouth nightly, Disp: 30 tablet, Rfl: 0    nicotine polacrilex (NICORETTE) 2 MG gum, Take 1 each by mouth as needed for Smoking cessation, Disp: 110 each, Rfl: 0    Examination:  BP (!) 91/48   Pulse 59   Temp 97 °F (36.1 °C) (Temporal)   Resp 14   Ht 5' 6\" (1.676 m)   Wt 143 lb (64.9 kg)   SpO2 100%   BMI 23.08 kg/m²   Gait - steady    HOSPITAL COURSE[de-identified]  Patient admitted to unit on 1/5/2021 was closely monitored for psychosis. His evaluation was treated with Abilify 25 mg daily for psychosis Depakote 5 mg twice daily for mood stabilization. He is also continued on his Abilify maintain injection 40 mg IM every 30 days next injection is due 1/28/2021. Medical instrument significant patient continued to improve on the floor.   He started coming out of her room his he was attending groups he was socializing with slight peers. He was more relaxed he was able to smile less flat and blunted.  obtain confirmation patient's family was able voicing concerns that they had reported they requested patient to be discharged to crisis stabilization unit prior to coming home. Treatment team felt the patient in the maximum benefit from his hospitalization and he was set up with an outpatient mental health agency for outpatient follow-up services. The time of discharge patient vehemently denied any suicidal homicidal ideations intent or plan. He was eating well sleeping well there are no neurovegetative signs or symptoms of depression. He denied any auditory or visual hallucinations were no overt or covert signs psychosis. He was appreciative the help that he received here. This patient no longer meets criteria for inpatient hospitalization. No AVH or paranoid thoughts  No hopeless or worthless feeling  No active SI/HI  Appetite:  [x] Normal  [] Increased  [] Decreased    Sleep:       [x] Normal  [] Fair       [] Poor            Energy:    [x] Normal  [] Increased  [] Decreased     SI [] Present  [x] Absent  HI  []Present  [x] Absent   Aggression:  [] yes  [x] no  Patient is [x] able  [] unable to CONTRACT FOR SAFETY   Medication side effects(SE):  [x] None(Psych. Meds.) [] Other      Mental Status Examination on discharge:    Level of consciousness:  within normal limits   Appearance:  well-appearing  Behavior/Motor:  no abnormalities noted  Attitude toward examiner:  attentive and good eye contact  Speech:  spontaneous, normal rate and normal volume   Mood: \" My mood is good. \"  Affect: Brighter appropriate and pleasant  Thought processes: Linear without flight of ideas or loose associations  Thought content: Devoid of any auditory visual hallucinations delusions or other perceptual denies.   Denies SI/HI intent or plan  Cognition:  oriented to person, place, and time Concentration intact  Memory intact  Insight good   Judgement fair   Fund of Knowledge adequate      ASSESSMENT:  Patient symptoms are:  [x] Well controlled  [x] Improving  [] Worsening  [] No change    Reason for more than one antipsychotic:  [x] N/A  [] 3 Failed Monotherapy attempts (Drugs tried:)  [] Crossover to a new antipsychotic  [] Taper to Monotherapy from Polypharmacy  [] Augmentation of clozapine therapy due to treatment resistance to single therapy    Diagnosis:  Principal Problem:    Schizophrenia, paranoid ([facility] [address]  Resolved Problems:    * No resolved hospital problems. *      LABS:    No results for input(s): WBC, HGB, PLT in the last 72 hours. No results for input(s): NA, K, CL, CO2, BUN, CREATININE, GLUCOSE in the last 72 hours. No results for input(s): BILITOT, ALKPHOS, AST, ALT in the last 72 hours. Lab Results   Component Value Date    LABAMPH NOT DETECTED 01/05/2021    BARBSCNU NOT DETECTED 01/05/2021    LABBENZ NOT DETECTED 01/05/2021    LABMETH NOT DETECTED 01/05/2021    OPIATESCREENURINE NOT DETECTED 01/05/2021    PHENCYCLIDINESCREENURINE NOT DETECTED 01/05/2021    ETOH <10 01/05/2021     No results found for: TSH, FREET4  No results found for: LITHIUM  Lab Results   Component Value Date    VALPROATE 45 (L) 01/05/2021       RISK ASSESSMENT AT DISCHARGE: Low risk for suicide and homicide. Treatment Plan:  Reviewed current Medications with the patient. Education provided on the complaince with treatment. Risks, benefits, side effects, drug-to-drug interactions and alternatives to treatment were discussed. Encourage patient to attend outpatient follow up appointment and therapy. Patient was advised to call the outpatient provider, visit the nearest ED or call 911 if symptoms are not manageable. Patient's family member was contacted prior to the discharge.          Medication List      CHANGE how you take these medications    * ARIPiprazole  MG Prsy  Commonly known as: ABILIFY MAINTENA  What changed: Another medication with the same name was changed. Make sure you understand how and when to take each. * ARIPiprazole 5 MG tablet  Commonly known as: ABILIFY  Take 5 tablets by mouth daily  What changed:   · medication strength  · how much to take         * This list has 2 medication(s) that are the same as other medications prescribed for you. Read the directions carefully, and ask your doctor or other care provider to review them with you.             CONTINUE taking these medications    benztropine 0.5 MG tablet  Commonly known as: COGENTIN  Take 1 tablet by mouth 2 times daily     divalproex 500 MG DR tablet  Commonly known as: DEPAKOTE  Take 1 tablet by mouth every 12 hours     melatonin 3 MG Tabs tablet  Take 1 tablet by mouth nightly     nicotine polacrilex 2 MG gum  Commonly known as: NICORETTE  Take 1 each by mouth as needed for Smoking cessation     polyethylene glycol 17 GM/SCOOP powder  Commonly known as: GLYCOLAX           Where to Get Your Medications      These medications were sent to [facility] [address] [address] [address]    Phone: [phone]   · ARIPiprazole 5 MG tablet       Patient is counseled he must been compliant with all medications outpatient follow-up appointments    Patient is discharged to the crisis unit in psychiatrically stable condition    TIME SPEND - Kecia Epps [address]41, DISCHARGE SUMMARY, MEDICATION RECONCILIATION AND FOLLOW UP CARE     Signed:  Melania Joshi  5/67/4018  43:94 PM

## 2021-01-19 NOTE — PROGRESS NOTES
CLINICAL PHARMACY NOTE: MEDS  [employer] Patient?: No  Total # of Prescriptions Filled: 1   The following medications were delivered to the patient:  · Aripiprazole 20 mg 20 mg  Total # of Interventions Completed: 3  Time Spent (min): 15    Additional Documentation:  Delivered rest of his meds to [facility]

## 2021-01-20 NOTE — PROGRESS NOTES
needed for Smoking cessation, Disp: 110 each, Rfl: 0      Examination:  BP (!) 91/48   Pulse 59   Temp 97 °F (36.1 °C) (Temporal)   Resp 14   Ht 5' 6\" (1.676 m)   Wt 143 lb (64.9 kg)   SpO2 100%   BMI 23.08 kg/m²   Gait - steady  Medication side effects(SE): No    Mental Status Examination:    Level of consciousness:  within normal limits   Appearance: Fair grooming and hygiene   behavior/Motor:  no abnormalities noted  Attitude toward examiner: Cooperative   speech: Normal rate rhythm and tone  Mood: \" I am feeling better. \"  Affect: Appropriate and pleasant  Thought processes: Linear without flight of ideas loose associations  Thought content: Devoid of any auditory visualizations delusions or perceptual normalities. Paranoid about the crisis unit denies SI/HI intent or plan  Cognition:  oriented to person, place, and time   Concentration poor  Insight improving  Judgement improving    ASSESSMENT:   Patient symptoms are:  [] Well controlled  [x] Improving  [] Worsening  [] No change      Diagnosis:   Principal Problem:    [facility]  Resolved Problems:    * No resolved hospital problems. *      LABS:    No results for input(s): WBC, HGB, PLT in the last 72 hours. No results for input(s): NA, K, CL, CO2, BUN, CREATININE, GLUCOSE in the last 72 hours. No results for input(s): BILITOT, ALKPHOS, AST, ALT in the last 72 hours. Lab Results   Component Value Date    LABAMPH NOT DETECTED 01/05/2021    BARBSCNU NOT DETECTED 01/05/2021    LABBENZ NOT DETECTED 01/05/2021    LABMETH NOT DETECTED 01/05/2021    OPIATESCREENURINE NOT DETECTED 01/05/2021    PHENCYCLIDINESCREENURINE NOT DETECTED 01/05/2021    ETOH <10 01/05/2021     No results found for: TSH, FREET4  No results found for: LITHIUM  Lab Results   Component Value Date    VALPROATE 45 (L) 01/05/2021           Treatment Plan:  Reviewed current Medications with the patient.    Risks, benefits, side effects, drug-to-drug interactions and alternatives to treatment were discussed. Collateral information:   CD evaluation  Encourage patient to attend group and other milieu activities.   Discharge planning discussed with the patient and treatment team.    Continue Abilify 25 mg daily for psychosis  Continue Abilify maintain a 400 mg IM q. 28 days  Continue Depakote 5 mg twice daily for mood stabilization  Continue Cogentin 0.5 mg twice daily for side effects  Continue melatonin 3 mg at bedtime for sleep      PSYCHOTHERAPY/COUNSELING:  [x] Therapeutic interview  [x] Supportive  [] CBT  [] Ongoing  [] Other    [x] Patient continues to need, on a daily basis, active treatment furnished directly by or requiring the supervision of inpatient psychiatric personnel      Anticipated Length of stay: 5 to 7 days based on stability            Electronically signed by SEGUN Brar CNP on 1/74/5151 at 3:05 PM

## 2022-08-21 ENCOUNTER — HOSPITAL ENCOUNTER (EMERGENCY)
Age: 37
Discharge: HOME | End: 2022-08-21
Payer: COMMERCIAL

## 2022-08-21 ENCOUNTER — APPOINTMENT (OUTPATIENT)
Dept: GENERAL RADIOLOGY | Age: 37
End: 2022-08-21
Payer: COMMERCIAL

## 2022-08-21 VITALS
DIASTOLIC BLOOD PRESSURE: 111 MMHG | HEART RATE: 96 BPM | SYSTOLIC BLOOD PRESSURE: 160 MMHG | OXYGEN SATURATION: 100 % | RESPIRATION RATE: 16 BRPM | TEMPERATURE: 98.2 F

## 2022-08-21 DIAGNOSIS — U07.1 COVID-19: Primary | ICD-10-CM

## 2022-08-21 LAB
SARS-COV-2, NAAT: DETECTED
STREP GRP A PCR: NEGATIVE

## 2022-08-21 PROCEDURE — 71046 X-RAY EXAM CHEST 2 VIEWS: CPT

## 2022-08-21 PROCEDURE — 87635 SARS-COV-2 COVID-19 AMP PRB: CPT

## 2022-08-21 PROCEDURE — 99284 EMERGENCY DEPT VISIT MOD MDM: CPT

## 2022-08-21 PROCEDURE — 87880 STREP A ASSAY W/OPTIC: CPT

## 2022-08-21 ASSESSMENT — ENCOUNTER SYMPTOMS
SINUS PAIN: 0
DIARRHEA: 0
CHEST TIGHTNESS: 0
COUGH: 1
SHORTNESS OF BREATH: 0
BACK PAIN: 0
CONSTIPATION: 0
ABDOMINAL PAIN: 0
SORE THROAT: 1
VOMITING: 0
NAUSEA: 0
COLOR CHANGE: 0
SINUS PRESSURE: 0

## 2022-08-21 NOTE — ED NOTES
Department of Emergency Medicine  FIRST PROVIDER TRIAGE NOTE             [facility]           8/21/22  6:08 PM EDT    Date of Encounter: 8/21/22   MRN: [id number]      HPI: Opal Canales is a 39 y.o. male who presents to the ED for Cough and Chills   Pt concerned for COVID. Cough and sore throat x 5 days. No known exposure. ROS: Negative for sob or vomiting. PE: Gen Appearance/Constitutional: alert  Musculoskeletal: moves all extremities x 4     Initial Plan of Care: All treatment areas with department are currently occupied. Plan to order/Initiate the following while awaiting opening in ED: COVID-19 testing.   Initiate Treatment-Testing, Proceed toTreatment Area When Bed Available for ED Attending/MLP to Continue Care    Electronically signed by Eliana Carrizales PA-C   DD: 8/21/22       Eliana Carrizales PA-C  08/21/22 [id number]

## 2022-08-22 NOTE — ED PROVIDER NOTES
[facility]        Department of Emergency Medicine   ED  Provider Note  Admit Date/RoomTime: 8/21/2022  8:10 PM  ED Room: 30/30  HPI:  8/21/22, Time: 10:45 PM EDT      The patient is a 76-year-old male to department concerns for COVID-19. Patient states that for the last 5 days he has had body aches, cough and sore throat. She has been taking over-the-counter medications and vitamins. Patient has not taken a home test.  He is not vaccinated. He states he does have some burning in his chest but only when he coughs. He denies any fevers, shortness of breath, dizziness, abdominal pain, nausea/vomiting, chest or back pain, congestion. The history is provided by the patient. No  was used. REVIEW OF SYSTEMS:  Review of Systems   Constitutional:  Negative for activity change, chills, fatigue and fever. HENT:  Positive for sore throat. Negative for congestion, sinus pressure and sinus pain. Respiratory:  Positive for cough. Negative for chest tightness and shortness of breath. Cardiovascular:  Negative for chest pain, palpitations and leg swelling. Gastrointestinal:  Negative for abdominal pain, constipation, diarrhea, nausea and vomiting. Genitourinary:  Negative for dysuria, flank pain, frequency and hematuria. Musculoskeletal:  Positive for myalgias. Negative for back pain, neck pain and neck stiffness. Skin:  Negative for color change, pallor and rash. Neurological:  Negative for dizziness, light-headedness and headaches. Psychiatric/Behavioral:  Negative for agitation, behavioral problems and confusion. Pertinent positives and negatives are stated within HPI, all other systems reviewed and are negative.      --------------------------------------------- PAST HISTORY ---------------------------------------------  Past Medical History:  has a past medical history of Colitis.     Past Surgical History:  has a past surgical history that includes fracture surgery; hip surgery (Right); and Colonoscopy. Social History:  reports that he has been smoking cigarettes. He started smoking about 22 years ago. He has a 5.00 pack-year smoking history. He has never used smokeless tobacco. He reports current alcohol use of about 1.0 - 2.0 standard drink per week. He reports that he does not use drugs. Family History: family history includes Mental Illness in his mother. The patients home medications have been reviewed. Allergies: Patient has no known allergies. -------------------------------------------------- RESULTS -------------------------------------------------  All laboratory and radiology results have been personally reviewed by myself   LABS:  Results for orders placed or performed during the hospital encounter of 08/21/22   COVID-19, Rapid    Specimen: Nasopharyngeal Swab   Result Value Ref Range    SARS-CoV-2, NAAT DETECTED (A) Not Detected   Strep Screen Group A Throat    Specimen: Throat   Result Value Ref Range    Strep Grp A PCR Negative Negative       RADIOLOGY:  Interpreted by Radiologist.  XR CHEST (2 VW)   Final Result   No acute process. ------------------------- NURSING NOTES AND VITALS REVIEWED ---------------------------   The nursing notes within the ED encounter and vital signs as below have been reviewed. BP (!) 160/111   Pulse 96   Temp 98.2 °F (36.8 °C)   Resp 16   SpO2 100%   Oxygen Saturation Interpretation: Normal      ---------------------------------------------------PHYSICAL EXAM--------------------------------------    Physical Exam  Vitals and nursing note reviewed. Constitutional:       General: He is not in acute distress. Appearance: Normal appearance. He is well-developed. He is not ill-appearing. HENT:      Head: Normocephalic and atraumatic. Mouth/Throat:      Mouth: Mucous membranes are moist.      Pharynx: Oropharynx is clear. Posterior oropharyngeal erythema present.  No oropharyngeal exudate. Cardiovascular:      Rate and Rhythm: Normal rate and regular rhythm. Heart sounds: Normal heart sounds. No murmur heard. Pulmonary:      Effort: Pulmonary effort is normal. No respiratory distress. Breath sounds: Normal breath sounds. Musculoskeletal:         General: No swelling, tenderness or deformity. Normal range of motion. Cervical back: Normal range of motion and neck supple. No rigidity. Lymphadenopathy:      Cervical: No cervical adenopathy. Skin:     General: Skin is warm and dry. Capillary Refill: Capillary refill takes less than 2 seconds. Findings: No erythema. Neurological:      General: No focal deficit present. Mental Status: He is alert and oriented to person, place, and time. Mental status is at baseline. Coordination: Coordination normal.   Psychiatric:         Mood and Affect: Mood normal.         Behavior: Behavior normal.         Thought Content: Thought content normal.          ------------------------------ ED COURSE/MEDICAL DECISION MAKING----------------------  Medications - No data to display      ED COURSE:     XR CHEST (2 VW)   Final Result   No acute process. Procedures:  Procedures     Medical Decision Making:   MDM  79-year-old male presenting the ED with 5-day history of cough, sore throat and body aches. He is concerned for COVID-19. He is afebrile and hemodynamically stable on arrival.  Patient negative for strep but positive for COVID-19. His chest x-ray is unremarkable. Patient is overall extremely well-appearing and has no signs of any significant dehydration or respiratory issues. He is educated on supportive measures and advised to continue his over-the-counter medications and vitamins. He is to follow-up with PCP as needed or return to the ED with any new or worsening symptoms. Patient agreed with plan discharged home in good condition. Counseling:    The emergency provider has spoken with the patient and discussed todays results, in addition to providing specific details for the plan of care and counseling regarding the diagnosis and prognosis. Questions are answered at this time and they are agreeable with the plan.      --------------------------------- IMPRESSION AND DISPOSITION ---------------------------------    IMPRESSION  1. COVID-19        DISPOSITION  Disposition: Discharge to home  Patient condition is good      Electronically signed by Beatrice Sanchez PA-C   DD: 8/21/22  **This report was transcribed using voice recognition software. Every effort was made to ensure accuracy; however, inadvertent computerized transcription errors may be present.   END OF ED PROVIDER NOTE         Beatrice Sanchez PA-C  08/21/22 [id number]

## 2023-01-04 ENCOUNTER — APPOINTMENT (OUTPATIENT)
Dept: CT IMAGING | Age: 38
End: 2023-01-04
Payer: MEDICAID

## 2023-01-04 VITALS
SYSTOLIC BLOOD PRESSURE: 123 MMHG | HEART RATE: 90 BPM | BODY MASS INDEX: 25.71 KG/M2 | RESPIRATION RATE: 16 BRPM | HEIGHT: 66 IN | OXYGEN SATURATION: 98 % | WEIGHT: 160 LBS | TEMPERATURE: 99.3 F | DIASTOLIC BLOOD PRESSURE: 88 MMHG

## 2023-01-04 PROCEDURE — 99284 EMERGENCY DEPT VISIT MOD MDM: CPT

## 2023-01-04 ASSESSMENT — PAIN SCALES - GENERAL: PAINLEVEL_OUTOF10: 10

## 2023-01-04 ASSESSMENT — PAIN - FUNCTIONAL ASSESSMENT: PAIN_FUNCTIONAL_ASSESSMENT: 0-10

## 2023-01-05 ENCOUNTER — APPOINTMENT (OUTPATIENT)
Dept: CT IMAGING | Age: 38
End: 2023-01-05
Payer: MEDICAID

## 2023-01-05 ENCOUNTER — APPOINTMENT (OUTPATIENT)
Dept: GENERAL RADIOLOGY | Age: 38
End: 2023-01-05
Payer: MEDICAID

## 2023-01-05 ENCOUNTER — HOSPITAL ENCOUNTER (EMERGENCY)
Age: 38
Discharge: HOME | End: 2023-01-05
Payer: MEDICAID

## 2023-01-05 DIAGNOSIS — J18.9 PNEUMONIA OF RIGHT LOWER LOBE DUE TO INFECTIOUS ORGANISM: Primary | ICD-10-CM

## 2023-01-05 LAB
ALBUMIN SERPL-MCNC: 4.1 G/DL (ref 3.5–5.2)
ALP BLD-CCNC: 81 U/L (ref 40–129)
ALT SERPL-CCNC: 8 U/L (ref 0–40)
ANION GAP SERPL CALCULATED.3IONS-SCNC: 11 MMOL/L (ref 7–16)
AST SERPL-CCNC: 13 U/L (ref 0–39)
BACTERIA: ABNORMAL /HPF
BASOPHILS ABSOLUTE: 0.05 E9/L (ref 0–0.2)
BASOPHILS RELATIVE PERCENT: 0.4 % (ref 0–2)
BILIRUB SERPL-MCNC: 0.4 MG/DL (ref 0–1.2)
BILIRUBIN URINE: ABNORMAL
BLOOD, URINE: NEGATIVE
BUN BLDV-MCNC: 12 MG/DL (ref 6–20)
BURR CELLS: ABNORMAL
CALCIUM SERPL-MCNC: 9.4 MG/DL (ref 8.6–10.2)
CHLORIDE BLD-SCNC: 100 MMOL/L (ref 98–107)
CLARITY: CLEAR
CO2: 26 MMOL/L (ref 22–29)
COLOR: ABNORMAL
CREAT SERPL-MCNC: 1 MG/DL (ref 0.7–1.2)
EOSINOPHILS ABSOLUTE: 0.14 E9/L (ref 0.05–0.5)
EOSINOPHILS RELATIVE PERCENT: 1.1 % (ref 0–6)
EPITHELIAL CELLS, UA: ABNORMAL /HPF
GFR SERPL CREATININE-BSD FRML MDRD: >60 ML/MIN/1.73
GLUCOSE BLD-MCNC: 120 MG/DL (ref 74–99)
GLUCOSE URINE: NEGATIVE MG/DL
HCT VFR BLD CALC: 44 % (ref 37–54)
HEMOGLOBIN: 15.7 G/DL (ref 12.5–16.5)
IMMATURE GRANULOCYTES #: 0.08 E9/L
IMMATURE GRANULOCYTES %: 0.6 % (ref 0–5)
KETONES, URINE: 15 MG/DL
LACTIC ACID: 0.8 MMOL/L (ref 0.5–2.2)
LEUKOCYTE ESTERASE, URINE: NEGATIVE
LIPASE: 20 U/L (ref 13–60)
LYMPHOCYTES ABSOLUTE: 2.01 E9/L (ref 1.5–4)
LYMPHOCYTES RELATIVE PERCENT: 15.3 % (ref 20–42)
MCH RBC QN AUTO: 32.7 PG (ref 26–35)
MCHC RBC AUTO-ENTMCNC: 35.7 % (ref 32–34.5)
MCV RBC AUTO: 91.7 FL (ref 80–99.9)
MONOCYTES ABSOLUTE: 1.89 E9/L (ref 0.1–0.95)
MONOCYTES RELATIVE PERCENT: 14.4 % (ref 2–12)
MUCUS: PRESENT /LPF
NEUTROPHILS ABSOLUTE: 8.94 E9/L (ref 1.8–7.3)
NEUTROPHILS RELATIVE PERCENT: 68.2 % (ref 43–80)
NITRITE, URINE: NEGATIVE
OVALOCYTES: ABNORMAL
PDW BLD-RTO: 11.9 FL (ref 11.5–15)
PH UA: 6 (ref 5–9)
PLATELET # BLD: 235 E9/L (ref 130–450)
PMV BLD AUTO: 9.4 FL (ref 7–12)
POIKILOCYTES: ABNORMAL
POTASSIUM REFLEX MAGNESIUM: 3.8 MMOL/L (ref 3.5–5)
PROTEIN UA: ABNORMAL MG/DL
RBC # BLD: 4.8 E12/L (ref 3.8–5.8)
RBC UA: ABNORMAL /HPF (ref 0–2)
SODIUM BLD-SCNC: 137 MMOL/L (ref 132–146)
SPECIFIC GRAVITY UA: 1.02 (ref 1–1.03)
TEAR DROP CELLS: ABNORMAL
TOTAL PROTEIN: 7.3 G/DL (ref 6.4–8.3)
UROBILINOGEN, URINE: 1 E.U./DL
WBC # BLD: 13.1 E9/L (ref 4.5–11.5)
WBC UA: ABNORMAL /HPF (ref 0–5)

## 2023-01-05 PROCEDURE — 71046 X-RAY EXAM CHEST 2 VIEWS: CPT

## 2023-01-05 PROCEDURE — 74176 CT ABD & PELVIS W/O CONTRAST: CPT

## 2023-01-05 PROCEDURE — 83690 ASSAY OF LIPASE: CPT

## 2023-01-05 PROCEDURE — 85025 COMPLETE CBC W/AUTO DIFF WBC: CPT

## 2023-01-05 PROCEDURE — 80053 COMPREHEN METABOLIC PANEL: CPT

## 2023-01-05 PROCEDURE — 6370000000 HC RX 637 (ALT 250 FOR IP): Performed by: PHYSICIAN ASSISTANT

## 2023-01-05 PROCEDURE — 81001 URINALYSIS AUTO W/SCOPE: CPT

## 2023-01-05 PROCEDURE — 83605 ASSAY OF LACTIC ACID: CPT

## 2023-01-05 RX ORDER — CEFDINIR 300 MG/1
300 CAPSULE ORAL 2 TIMES DAILY
Qty: 20 CAPSULE | Refills: 0 | Status: SHIPPED | OUTPATIENT
Start: 2023-01-05 | End: 2023-01-15

## 2023-01-05 RX ORDER — DOXYCYCLINE HYCLATE 100 MG
100 TABLET ORAL 2 TIMES DAILY
Qty: 20 TABLET | Refills: 0 | Status: SHIPPED | OUTPATIENT
Start: 2023-01-05 | End: 2023-01-15

## 2023-01-05 RX ORDER — CEFDINIR 300 MG/1
300 CAPSULE ORAL ONCE
Status: COMPLETED | OUTPATIENT
Start: 2023-01-05 | End: 2023-01-05

## 2023-01-05 RX ORDER — DOXYCYCLINE HYCLATE 100 MG/1
100 CAPSULE ORAL ONCE
Status: COMPLETED | OUTPATIENT
Start: 2023-01-05 | End: 2023-01-05

## 2023-01-05 RX ADMIN — CEFDINIR 300 MG: 300 CAPSULE ORAL at 10:19

## 2023-01-05 RX ADMIN — DOXYCYCLINE HYCLATE 100 MG: 100 CAPSULE ORAL at 10:19

## 2023-01-05 NOTE — DISCHARGE INSTRUCTIONS
You were given 2 antibiotics please take them in full. You have right lower lobe pneumonia. Please follow-up with your family doctor in 7 to 10 days after the completion of the antibiotics.

## 2023-01-05 NOTE — ED PROVIDER NOTES
Independent VIRGIE Visit. [facility]  Department of Emergency Medicine   ED  Encounter Note  Admit Date/RoomTime: 2023  8:17 AM  ED Room: /37    NAME: Alicia Minor  : 1985  MRN: [id number]     Chief Complaint:  Flank Pain (Right sided, started yesterday, )    History of Present Illness        Alicia Minor is a 44 y.o. old male who presents to the emergency department by private vehicle, for sudden onset aching, sharp pain in the RUQ with radiation to the right shoulder which began 1 day(s) prior to arrival.  There has been similar episodes in the past.  Patient states \"I felt this about 3 weeks ago and then it went away. \"  Patient states he has no other associated symptoms just this weird right upper quadrant pain that radiates into his right shoulder. Since onset the symptoms have been stable. The pain is associated with no additional symptoms. The pain is aggravated by nothing in particular and relieved by nothing. There has been NO chest pain, shortness of breath, fever, chills, sweating, nausea, vomiting, anorexia, weight loss, diarrhea, constipation, dark/black stools, blood in stool, blood in emesis, cloudy urine, urinary frequency, dysuria, hematuria, urinary urgency, urinary incontinence, penile discharge, or scrotal pain. ROS   Pertinent positives and negatives are stated within HPI, all other systems reviewed and are negative. Past Medical History:  has a past medical history of Colitis. Surgical History:  has a past surgical history that includes fracture surgery; hip surgery (Right); and Colonoscopy. Social History:  reports that he has been smoking cigarettes. He started smoking about 23 years ago. He has a 5.00 pack-year smoking history. He has never used smokeless tobacco. He reports current alcohol use of about 1.0 - 2.0 standard drink per week. He reports that he does not use drugs.     Family History: family history includes Mental Illness in his mother. Allergies: Patient has no known allergies. Physical Exam   Oxygen Saturation Interpretation: Normal.        ED Triage Vitals [01/04/23 2315]   BP Temp Temp src Heart Rate Resp SpO2 Height Weight   123/88 99.3 °F (37.4 °C) -- 90 16 98 % 5' 6\" (1.676 m) 160 lb (72.6 kg)       Physical Exam  General Appearance/Constitutional:  Alert, development consistent with age. HEENT:  NC/NT. PERRLA. Airway patent. Neck:  Supple. No lymphadenopathy. Respiratory: Lungs Clear to auscultation on the left side with slight diminished breath sounds in the RLL. CV:  Regular rate and rhythm. GI:  normal appearing, non-distended with no visible hernias. Bowel sounds: normal bowel sounds. Tenderness: There is mild tenderness present - located in the RUQ. Carlos Epi Nonsurgical abdomen noted         Liver: non-tender. Spleen:  non-tender. Back: CVA Tenderness: No CVA tenderness. Integument:  Normal turgor. Warm, dry, without visible rash, unless noted elsewhere. Neurological:  Orientation age-appropriate. Motor functions intact.     Lab / Imaging Results   (All laboratory and radiology results have been personally reviewed by myself)  Labs:  Results for orders placed or performed during the hospital encounter of 01/05/23   Comprehensive Metabolic Panel w/ Reflex to MG   Result Value Ref Range    Sodium 137 132 - 146 mmol/L    Potassium reflex Magnesium 3.8 3.5 - 5.0 mmol/L    Chloride 100 98 - 107 mmol/L    CO2 26 22 - 29 mmol/L    Anion Gap 11 7 - 16 mmol/L    Glucose 120 (H) 74 - 99 mg/dL    BUN 12 6 - 20 mg/dL    Creatinine 1.0 0.7 - 1.2 mg/dL    Est, Glom Filt Rate >60 >=60 mL/min/1.73    Calcium 9.4 8.6 - 10.2 mg/dL    Total Protein 7.3 6.4 - 8.3 g/dL    Albumin 4.1 3.5 - 5.2 g/dL    Total Bilirubin 0.4 0.0 - 1.2 mg/dL    Alkaline Phosphatase 81 40 - 129 U/L    ALT 8 0 - 40 U/L    AST 13 0 - 39 U/L   CBC with Auto Differential   Result Value Ref Range    WBC 13.1 (H) 4.5 - 11.5 E9/L    RBC 4.80 3.80 - 5.80 E12/L    Hemoglobin 15.7 12.5 - 16.5 g/dL    Hematocrit 44.0 37.0 - 54.0 %    MCV 91.7 80.0 - 99.9 fL    MCH 32.7 26.0 - 35.0 pg    MCHC 35.7 (H) 32.0 - 34.5 %    RDW 11.9 11.5 - 15.0 fL    Platelets 807 235 - 908 E9/L    MPV 9.4 7.0 - 12.0 fL    Neutrophils % 68.2 43.0 - 80.0 %    Immature Granulocytes % 0.6 0.0 - 5.0 %    Lymphocytes % 15.3 (L) 20.0 - 42.0 %    Monocytes % 14.4 (H) 2.0 - 12.0 %    Eosinophils % 1.1 0.0 - 6.0 %    Basophils % 0.4 0.0 - 2.0 %    Neutrophils Absolute 8.94 (H) 1.80 - 7.30 E9/L    Immature Granulocytes # 0.08 E9/L    Lymphocytes Absolute 2.01 1.50 - 4.00 E9/L    Monocytes Absolute 1.89 (H) 0.10 - 0.95 E9/L    Eosinophils Absolute 0.14 0.05 - 0.50 E9/L    Basophils Absolute 0.05 0.00 - 0.20 E9/L    Poikilocytes 1+     [place] Cells 1+     Ovalocytes 1+     Tear Drop Cells 1+    Urinalysis with Microscopic   Result Value Ref Range    Color, UA DARK YELLOW (A) Straw/Yellow    Clarity, UA Clear Clear    Glucose, Ur Negative Negative mg/dL    Bilirubin Urine SMALL (A) Negative    Ketones, Urine 15 (A) Negative mg/dL    Specific Gravity, UA 1.025 1.005 - 1.030    Blood, Urine Negative Negative    pH, UA 6.0 5.0 - 9.0    Protein, UA TRACE Negative mg/dL    Urobilinogen, Urine 1.0 <2.0 E.U./dL    Nitrite, Urine Negative Negative    Leukocyte Esterase, Urine Negative Negative    Mucus, UA Present (A) None Seen /LPF    WBC, UA 0-1 0 - 5 /HPF    RBC, UA 0-1 0 - 2 /HPF    Epithelial Cells, UA NONE SEEN /HPF    Bacteria, UA MANY (A) None Seen /HPF   Lactic Acid   Result Value Ref Range    Lactic Acid 0.8 0.5 - 2.2 mmol/L   Lipase   Result Value Ref Range    Lipase 20 13 - 60 U/L     Imaging: All Radiology results interpreted by Radiologist unless otherwise noted. XR CHEST (2 VW)   Final Result   Right lower lobe pneumonia. CT ABDOMEN PELVIS WO CONTRAST Additional Contrast? None   Final Result   1. No renal calculus or renal or bowel obstruction.    2. Severe colonic stool retention. 3. Right pleural effusion and suspicion for right lower lobe infiltrate. ED Course / Medical Decision Making     Medications   cefdinir (OMNICEF) capsule 300 mg (300 mg Oral Given 1/5/23 1019)   doxycycline hyclate (VIBRAMYCIN) capsule 100 mg (100 mg Oral Given 1/5/23 1019)     ED Course as of 01/05/23 1643   Thu Jan 05, 2023   1003 Patient was spoken to about diagnosis. He was educated that he has a right lower lobe pneumonia. Patient will be initiated on antibiotics. First dose will be given here. Patient is in agreement. Patient uses [address] [AM]      ED Course User Index  [AM] Socorro Schreiber PA-C       Consultations:             None    Procedures:   none    MDM:    Patient is a 44 y.o. male who presents to the emergency department for sharp RUQ pain radiating to the right shoulder that began 1 day prior to arrival. Patient denies any additional symptoms, including chest pain, shortness of breath, vomiting, fever, dysuria, or hematuria. Lung exam reveals slightly diminished breath sounds in the RLL upon auscultation. Mild tenderness upon palpitation in the RUQ of the abdomen, but it is noted that patient has a nonsurgical abdomen. As a result of history and physical exam findings, a CMP, CBC with differential, urinalysis, lactic acid, and lipase was ordered. CBC showed a mild increase in WBC. A frontal and lateral chest XR was ordered, revealing a consolidation in the posterolateral aspect of the RLL. Revealed no evidence of an JAY. Lactic acid rule out level was 0.8 and within normal limits and lipase level was 20 within normal limits. A CT scan of the abdomen and pelvis did not show any renal calculus or obstruction. The CT scan did show a right pleural effusion and a suspicion of a RLL infiltrate. The diagnosis of a RLL pneumonia was explained to the patient and patient was initiated on antibiotics.  The first doses of cefdinir 300 mg orally and doxycyline 100 mg orally. Were given to the patient in the emergency department and instructions were given to the patient to  remaining course of antibiotics at his pharmacy. Patient was educated that the cefdinir 300 mg will be twice a day for the next 10 days as well as the doxycycline 100 mg orally twice a day for the next 10 days. Patient was educated to return to the emergency department for any worsening symptoms, including shortness of breath and chest pain. Patient told to follow up with PCP in 7-10 days. Patient was advised if he develops any fevers, chills, worsening chest pain or worsening shortness of breath to always please return back to the emergency department immediately. Patient was advised to follow closely with his family doctor. Vitals were found to be completely stable. Patient was explicitly instructed on specific signs and symptoms on which to return to the emergency room for. Patient was instructed to return to the ER for any new or worsening symptoms. Additional discharge instructions were given verbally. All questions were answered. Patient is comfortable and agreeable with discharge plan. Patient in no acute distress and non-toxic in appearance. Plan of Care/Counseling:  Alena Hodo PA-C  reviewed today's visit with the patient in addition to providing specific details for the plan of care and counseling regarding the diagnosis and prognosis. Questions are answered at this time and are agreeable with the plan. Assessment      1. Pneumonia of right lower lobe due to infectious organism      This patient's ED course included: a personal history and physicial examination and re-evaluation prior to disposition  This patient has remained hemodynamically stable during their ED course. Plan   Discharged home  Patient condition is stable.     New Medications     Discharge Medication List as of 1/5/2023 10:30 AM        START taking these medications    Details doxycycline hyclate (VIBRA-TABS) 100 MG tablet Take 1 tablet by mouth 2 times daily for 10 days, Disp-20 tablet, R-0Normal      cefdinir (OMNICEF) 300 MG capsule Take 1 capsule by mouth 2 times daily for 10 days, Disp-20 capsule, R-0Normal           Electronically signed by Esha Grimm PA-C   DD: 1/5/23  **This report was transcribed using voice recognition software. Every effort was made to ensure accuracy; however, inadvertent computerized transcription errors may be present.   END OF PROVIDER NOTE      Esha Grimm PA-C  01/05/23 2589

## 2023-01-05 NOTE — ED NOTES
Department of Emergency Medicine  FIRST PROVIDER TRIAGE NOTE             Independent MLP           1/4/23  11:17 PM EST    Date of Encounter: 1/4/23   MRN: [id number]      HPI: Diana Obregon is a 44 y.o. male who presents to the ED for Flank Pain (Right sided, started yesterday, )  Patient complains of right-sided flank pain which began yesterday. He denies abdominal pain, nausea, vomiting or diarrhea. Denies any urinary symptoms. States he had pain like this a week ago, but it resolved. He denies any injury or trauma. ROS: Negative for cp or sob. PE: Gen Appearance/Constitutional: alert  CV: regular rate  Pulm: CTA bilat     Initial Plan of Care: All treatment areas with department are currently occupied. Plan to order/Initiate the following while awaiting opening in ED: labs and imaging studies.   Initiate Treatment-Testing, Proceed toTreatment Area When Bed Available for ED Attending/MLP to Continue Care    Electronically signed by SEGUN Hinojosa CNP   DD: 1/4/23       SEGUN Hinojosa CNP  01/04/23 1134

## 2023-01-05 NOTE — Clinical Note
Prachi Raulmarybelsammy was seen and treated in our emergency department on 1/4/2023. He may return to work on 01/09/2023. If you have any questions or concerns, please don't hesitate to call.       Gideon Wallis PA-C

## 2023-01-11 ENCOUNTER — HOSPITAL ENCOUNTER (OUTPATIENT)
Dept: GENERAL RADIOLOGY | Age: 38
Discharge: HOME | End: 2023-01-13
Payer: MEDICARE

## 2023-01-11 ENCOUNTER — HOSPITAL ENCOUNTER (OUTPATIENT)
Age: 38
Discharge: HOME | End: 2023-01-13
Payer: MEDICARE

## 2023-01-11 DIAGNOSIS — R52 PAIN: ICD-10-CM

## 2023-01-11 PROCEDURE — 71046 X-RAY EXAM CHEST 2 VIEWS: CPT

## 2023-01-24 LAB
BASOPHILS ABSOLUTE: 0.07 E9/L (ref 0–0.2)
BASOPHILS RELATIVE PERCENT: 1.3 % (ref 0–2)
EOSINOPHILS ABSOLUTE: 0.26 E9/L (ref 0.05–0.5)
EOSINOPHILS RELATIVE PERCENT: 4.7 % (ref 0–6)
HCT VFR BLD CALC: 44.4 % (ref 37–54)
HEMOGLOBIN: 15.3 G/DL (ref 12.5–16.5)
IMMATURE GRANULOCYTES #: 0.06 E9/L
IMMATURE GRANULOCYTES %: 1.1 % (ref 0–5)
LYMPHOCYTES ABSOLUTE: 1.81 E9/L (ref 1.5–4)
LYMPHOCYTES RELATIVE PERCENT: 32.7 % (ref 20–42)
MCH RBC QN AUTO: 31.9 PG (ref 26–35)
MCHC RBC AUTO-ENTMCNC: 34.5 % (ref 32–34.5)
MCV RBC AUTO: 92.7 FL (ref 80–99.9)
MONOCYTES ABSOLUTE: 0.57 E9/L (ref 0.1–0.95)
MONOCYTES RELATIVE PERCENT: 10.3 % (ref 2–12)
NEUTROPHILS ABSOLUTE: 2.76 E9/L (ref 1.8–7.3)
NEUTROPHILS RELATIVE PERCENT: 49.9 % (ref 43–80)
PDW BLD-RTO: 11.9 FL (ref 11.5–15)
PLATELET # BLD: 225 E9/L (ref 130–450)
PMV BLD AUTO: 11 FL (ref 7–12)
RBC # BLD: 4.79 E12/L (ref 3.8–5.8)
WBC # BLD: 5.5 E9/L (ref 4.5–11.5)

## 2023-01-25 LAB
ALBUMIN SERPL-MCNC: 4.1 G/DL (ref 3.5–5.2)
ALP BLD-CCNC: 75 U/L (ref 40–129)
ALT SERPL-CCNC: 12 U/L (ref 0–40)
ANION GAP SERPL CALCULATED.3IONS-SCNC: 16 MMOL/L (ref 7–16)
AST SERPL-CCNC: 15 U/L (ref 0–39)
BILIRUB SERPL-MCNC: 0.3 MG/DL (ref 0–1.2)
BUN BLDV-MCNC: 12 MG/DL (ref 6–20)
CALCIUM SERPL-MCNC: 9.4 MG/DL (ref 8.6–10.2)
CHLORIDE BLD-SCNC: 102 MMOL/L (ref 98–107)
CO2: 23 MMOL/L (ref 22–29)
CREAT SERPL-MCNC: 1.1 MG/DL (ref 0.7–1.2)
GFR SERPL CREATININE-BSD FRML MDRD: >60 ML/MIN/1.73
GLUCOSE BLD-MCNC: 140 MG/DL (ref 74–99)
POTASSIUM SERPL-SCNC: 4.2 MMOL/L (ref 3.5–5)
PROLACTIN: 12.09 NG/ML
SODIUM BLD-SCNC: 141 MMOL/L (ref 132–146)
TOTAL PROTEIN: 6.8 G/DL (ref 6.4–8.3)
VALPROIC ACID LEVEL: 59 MCG/ML (ref 50–100)
VITAMIN D 25-HYDROXY: 25 NG/ML (ref 30–100)

## 2024-04-03 LAB
ALBUMIN SERPL-MCNC: 4.4 G/DL (ref 3.5–5.2)
ALP SERPL-CCNC: 67 U/L (ref 40–129)
ALT SERPL-CCNC: 15 U/L (ref 0–40)
ANION GAP SERPL CALCULATED.3IONS-SCNC: 14 MMOL/L (ref 7–16)
AST SERPL-CCNC: 19 U/L (ref 0–39)
BASOPHILS # BLD: 0.05 K/UL (ref 0–0.2)
BASOPHILS NFR BLD: 1 % (ref 0–2)
BILIRUB SERPL-MCNC: 0.3 MG/DL (ref 0–1.2)
BUN SERPL-MCNC: 12 MG/DL (ref 6–20)
CALCIUM SERPL-MCNC: 9.7 MG/DL (ref 8.6–10.2)
CHLORIDE SERPL-SCNC: 108 MMOL/L (ref 98–107)
CHOLEST SERPL-MCNC: 207 MG/DL
CO2 SERPL-SCNC: 24 MMOL/L (ref 22–29)
CREAT SERPL-MCNC: 1 MG/DL (ref 0.7–1.2)
DATE LAST DOSE: NORMAL
EOSINOPHIL # BLD: 0.21 K/UL (ref 0.05–0.5)
EOSINOPHILS RELATIVE PERCENT: 4 % (ref 0–6)
ERYTHROCYTE [DISTWIDTH] IN BLOOD BY AUTOMATED COUNT: 12.3 % (ref 11.5–15)
GFR SERPL CREATININE-BSD FRML MDRD: >90 ML/MIN/1.73M2
GLUCOSE SERPL-MCNC: 123 MG/DL (ref 74–99)
HBA1C MFR BLD: 5.4 % (ref 4–5.6)
HCT VFR BLD AUTO: 46.5 % (ref 37–54)
HDLC SERPL-MCNC: 59 MG/DL
HGB BLD-MCNC: 15.8 G/DL (ref 12.5–16.5)
IMM GRANULOCYTES # BLD AUTO: 0.05 K/UL (ref 0–0.58)
IMM GRANULOCYTES NFR BLD: 1 % (ref 0–5)
LDLC SERPL CALC-MCNC: 125 MG/DL
LYMPHOCYTES NFR BLD: 2.29 K/UL (ref 1.5–4)
LYMPHOCYTES RELATIVE PERCENT: 40 % (ref 20–42)
MCH RBC QN AUTO: 31.7 PG (ref 26–35)
MCHC RBC AUTO-ENTMCNC: 34 G/DL (ref 32–34.5)
MCV RBC AUTO: 93.4 FL (ref 80–99.9)
MONOCYTES NFR BLD: 0.67 K/UL (ref 0.1–0.95)
MONOCYTES NFR BLD: 12 % (ref 2–12)
NEUTROPHILS NFR BLD: 43 % (ref 43–80)
NEUTS SEG NFR BLD: 2.52 K/UL (ref 1.8–7.3)
PLATELET # BLD AUTO: 197 K/UL (ref 130–450)
PMV BLD AUTO: 10.2 FL (ref 7–12)
POTASSIUM SERPL-SCNC: 4.3 MMOL/L (ref 3.5–5)
PROLACTIN SERPL-MCNC: 8.17 NG/ML
PROT SERPL-MCNC: 7 G/DL (ref 6.4–8.3)
RBC # BLD AUTO: 4.98 M/UL (ref 3.8–5.8)
SODIUM SERPL-SCNC: 146 MMOL/L (ref 132–146)
T3 SERPL-MCNC: 98 NG/DL (ref 80–200)
T4 SERPL-MCNC: 6.6 UG/DL (ref 4.5–11.7)
TME LAST DOSE: NORMAL H
TRIGL SERPL-MCNC: 117 MG/DL
TSH SERPL DL<=0.05 MIU/L-ACNC: 1.46 UIU/ML (ref 0.27–4.2)
VALPROATE SERPL-MCNC: 62 UG/ML (ref 50–100)
VANCOMYCIN DOSE: NORMAL MG
VIT B12 SERPL-MCNC: 496 PG/ML (ref 211–946)
VLDLC SERPL CALC-MCNC: 23 MG/DL
WBC OTHER # BLD: 5.8 K/UL (ref 4.5–11.5)

## 2024-04-04 LAB — 25(OH)D3 SERPL-MCNC: 27 NG/ML (ref 30–100)

## 2025-03-24 LAB
25(OH)D3 SERPL-MCNC: 44.5 NG/ML (ref 30–100)
ALBUMIN SERPL-MCNC: 4.3 G/DL (ref 3.5–5.2)
ALP SERPL-CCNC: 74 U/L (ref 40–129)
ALT SERPL-CCNC: 10 U/L (ref 0–40)
ANION GAP SERPL CALCULATED.3IONS-SCNC: 16 MMOL/L (ref 7–16)
AST SERPL-CCNC: 16 U/L (ref 0–39)
BASOPHILS # BLD: 0.05 K/UL (ref 0–0.2)
BASOPHILS NFR BLD: 1 % (ref 0–2)
BILIRUB SERPL-MCNC: 0.3 MG/DL (ref 0–1.2)
BUN SERPL-MCNC: 10 MG/DL (ref 6–20)
CALCIUM SERPL-MCNC: 9.7 MG/DL (ref 8.6–10.2)
CHLORIDE SERPL-SCNC: 107 MMOL/L (ref 98–107)
CHOLEST SERPL-MCNC: 219 MG/DL
CO2 SERPL-SCNC: 20 MMOL/L (ref 22–29)
CREAT SERPL-MCNC: 1 MG/DL (ref 0.7–1.2)
DATE LAST DOSE: ABNORMAL
EOSINOPHIL # BLD: 0.12 K/UL (ref 0.05–0.5)
EOSINOPHILS RELATIVE PERCENT: 2 % (ref 0–6)
ERYTHROCYTE [DISTWIDTH] IN BLOOD BY AUTOMATED COUNT: 12.1 % (ref 11.5–15)
GFR, ESTIMATED: >90 ML/MIN/1.73M2
GLUCOSE SERPL-MCNC: 123 MG/DL (ref 74–99)
HBA1C MFR BLD: 5.3 % (ref 4–5.6)
HCT VFR BLD AUTO: 46.8 % (ref 37–54)
HDLC SERPL-MCNC: 63 MG/DL
HGB BLD-MCNC: 16.2 G/DL (ref 12.5–16.5)
IMM GRANULOCYTES # BLD AUTO: 0.03 K/UL (ref 0–0.58)
IMM GRANULOCYTES NFR BLD: 1 % (ref 0–5)
LDLC SERPL CALC-MCNC: 142 MG/DL
LYMPHOCYTES NFR BLD: 1.69 K/UL (ref 1.5–4)
LYMPHOCYTES RELATIVE PERCENT: 27 % (ref 20–42)
MCH RBC QN AUTO: 31.8 PG (ref 26–35)
MCHC RBC AUTO-ENTMCNC: 34.6 G/DL (ref 32–34.5)
MCV RBC AUTO: 91.8 FL (ref 80–99.9)
MONOCYTES NFR BLD: 0.63 K/UL (ref 0.1–0.95)
MONOCYTES NFR BLD: 10 % (ref 2–12)
NEUTROPHILS NFR BLD: 59 % (ref 43–80)
NEUTS SEG NFR BLD: 3.66 K/UL (ref 1.8–7.3)
PLATELET # BLD AUTO: 211 K/UL (ref 130–450)
PMV BLD AUTO: 10.8 FL (ref 7–12)
POTASSIUM SERPL-SCNC: 4.2 MMOL/L (ref 3.5–5)
PROT SERPL-MCNC: 7.2 G/DL (ref 6.4–8.3)
RBC # BLD AUTO: 5.1 M/UL (ref 3.8–5.8)
SODIUM SERPL-SCNC: 143 MMOL/L (ref 132–146)
TME LAST DOSE: ABNORMAL H
TRIGL SERPL-MCNC: 70 MG/DL
VALPROATE SERPL-MCNC: 49 UG/ML (ref 50–100)
VANCOMYCIN DOSE: ABNORMAL MG
VLDLC SERPL CALC-MCNC: 14 MG/DL
WBC OTHER # BLD: 6.2 K/UL (ref 4.5–11.5)